# Patient Record
Sex: FEMALE | Race: BLACK OR AFRICAN AMERICAN | Employment: UNEMPLOYED | URBAN - METROPOLITAN AREA
[De-identification: names, ages, dates, MRNs, and addresses within clinical notes are randomized per-mention and may not be internally consistent; named-entity substitution may affect disease eponyms.]

---

## 2021-01-01 ENCOUNTER — APPOINTMENT (OUTPATIENT)
Dept: CT IMAGING | Age: 66
DRG: 480 | End: 2021-01-01
Payer: COMMERCIAL

## 2021-01-01 ENCOUNTER — APPOINTMENT (OUTPATIENT)
Dept: GENERAL RADIOLOGY | Age: 66
DRG: 480 | End: 2021-01-01
Payer: COMMERCIAL

## 2021-01-01 ENCOUNTER — APPOINTMENT (OUTPATIENT)
Dept: MRI IMAGING | Age: 66
DRG: 480 | End: 2021-01-01
Payer: COMMERCIAL

## 2021-01-01 ENCOUNTER — ANESTHESIA EVENT (OUTPATIENT)
Dept: OPERATING ROOM | Age: 66
DRG: 480 | End: 2021-01-01
Payer: COMMERCIAL

## 2021-01-01 ENCOUNTER — ANESTHESIA (OUTPATIENT)
Dept: OPERATING ROOM | Age: 66
DRG: 480 | End: 2021-01-01
Payer: COMMERCIAL

## 2021-01-01 ENCOUNTER — HOSPITAL ENCOUNTER (INPATIENT)
Age: 66
LOS: 7 days | DRG: 480 | End: 2021-04-28
Attending: EMERGENCY MEDICINE | Admitting: SURGERY
Payer: COMMERCIAL

## 2021-01-01 VITALS
TEMPERATURE: 99 F | OXYGEN SATURATION: 42 % | HEIGHT: 61 IN | HEART RATE: 22 BPM | RESPIRATION RATE: 5 BRPM | DIASTOLIC BLOOD PRESSURE: 58 MMHG | SYSTOLIC BLOOD PRESSURE: 91 MMHG | BODY MASS INDEX: 22.47 KG/M2 | WEIGHT: 119 LBS

## 2021-01-01 VITALS — DIASTOLIC BLOOD PRESSURE: 78 MMHG | TEMPERATURE: 98.7 F | OXYGEN SATURATION: 100 % | SYSTOLIC BLOOD PRESSURE: 126 MMHG

## 2021-01-01 DIAGNOSIS — S72.91XA CLOSED FRACTURE OF RIGHT FEMUR, UNSPECIFIED FRACTURE MORPHOLOGY, UNSPECIFIED PORTION OF FEMUR, INITIAL ENCOUNTER (HCC): Primary | ICD-10-CM

## 2021-01-01 LAB
-: ABNORMAL
ABO/RH: NORMAL
ABSOLUTE EOS #: 0 K/UL (ref 0–0.4)
ABSOLUTE EOS #: 0.08 K/UL (ref 0–0.44)
ABSOLUTE EOS #: 0.2 K/UL (ref 0–0.4)
ABSOLUTE IMMATURE GRANULOCYTE: 0 K/UL (ref 0–0.3)
ABSOLUTE IMMATURE GRANULOCYTE: 1.03 K/UL (ref 0–0.3)
ABSOLUTE IMMATURE GRANULOCYTE: 10.63 K/UL (ref 0–0.3)
ABSOLUTE IMMATURE GRANULOCYTE: 10.79 K/UL (ref 0–0.3)
ABSOLUTE LYMPH #: 1.04 K/UL (ref 1–4.8)
ABSOLUTE LYMPH #: 1.33 K/UL (ref 1–4.8)
ABSOLUTE LYMPH #: 1.83 K/UL (ref 1–4.8)
ABSOLUTE LYMPH #: 2.08 K/UL (ref 1.1–3.7)
ABSOLUTE LYMPH #: 2.79 K/UL (ref 1–4.8)
ABSOLUTE LYMPH #: 3.71 K/UL (ref 1–4.8)
ABSOLUTE LYMPH #: 3.99 K/UL (ref 1–4.8)
ABSOLUTE LYMPH #: 5.39 K/UL (ref 1–4.8)
ABSOLUTE MONO #: 0.21 K/UL (ref 0.1–0.8)
ABSOLUTE MONO #: 1.22 K/UL (ref 0.1–0.8)
ABSOLUTE MONO #: 1.24 K/UL (ref 0.1–0.8)
ABSOLUTE MONO #: 1.41 K/UL (ref 0.1–1.2)
ABSOLUTE MONO #: 1.55 K/UL (ref 0.1–0.8)
ABSOLUTE MONO #: 1.77 K/UL (ref 0.1–0.8)
ABSOLUTE MONO #: 1.8 K/UL (ref 0.1–0.8)
ABSOLUTE MONO #: 2.66 K/UL (ref 0.1–0.8)
ACTION: NORMAL
ALBUMIN SERPL-MCNC: 1.3 G/DL (ref 3.5–5.2)
ALBUMIN SERPL-MCNC: 1.3 G/DL (ref 3.5–5.2)
ALBUMIN SERPL-MCNC: 1.7 G/DL (ref 3.5–5.2)
ALBUMIN SERPL-MCNC: 1.7 G/DL (ref 3.5–5.2)
ALBUMIN SERPL-MCNC: 2.5 G/DL (ref 3.5–5.2)
ALBUMIN/GLOBULIN RATIO: 0.5 (ref 1–2.5)
ALBUMIN/GLOBULIN RATIO: 0.5 (ref 1–2.5)
ALBUMIN/GLOBULIN RATIO: 0.7 (ref 1–2.5)
ALBUMIN/GLOBULIN RATIO: 0.7 (ref 1–2.5)
ALBUMIN/GLOBULIN RATIO: 0.8 (ref 1–2.5)
ALLEN TEST: ABNORMAL
ALLEN TEST: POSITIVE
ALLEN TEST: POSITIVE
ALP BLD-CCNC: 135 U/L (ref 35–104)
ALP BLD-CCNC: 152 U/L (ref 35–104)
ALP BLD-CCNC: 235 U/L (ref 35–104)
ALP BLD-CCNC: 65 U/L (ref 35–104)
ALP BLD-CCNC: 82 U/L (ref 35–104)
ALT SERPL-CCNC: 22 U/L (ref 5–33)
ALT SERPL-CCNC: 23 U/L (ref 5–33)
ALT SERPL-CCNC: 29 U/L (ref 5–33)
ALT SERPL-CCNC: 30 U/L (ref 5–33)
ALT SERPL-CCNC: 41 U/L (ref 5–33)
AMMONIA: 67 UMOL/L (ref 11–51)
AMORPHOUS: ABNORMAL
AMPHETAMINE SCREEN URINE: NEGATIVE
AMPHETAMINE SCREEN URINE: NEGATIVE
ANION GAP SERPL CALCULATED.3IONS-SCNC: 11 MMOL/L (ref 9–17)
ANION GAP SERPL CALCULATED.3IONS-SCNC: 11 MMOL/L (ref 9–17)
ANION GAP SERPL CALCULATED.3IONS-SCNC: 17 MMOL/L (ref 9–17)
ANION GAP SERPL CALCULATED.3IONS-SCNC: 20 MMOL/L (ref 9–17)
ANION GAP SERPL CALCULATED.3IONS-SCNC: 21 MMOL/L (ref 9–17)
ANION GAP SERPL CALCULATED.3IONS-SCNC: 21 MMOL/L (ref 9–17)
ANION GAP SERPL CALCULATED.3IONS-SCNC: 23 MMOL/L (ref 9–17)
ANION GAP SERPL CALCULATED.3IONS-SCNC: 24 MMOL/L (ref 9–17)
ANION GAP SERPL CALCULATED.3IONS-SCNC: 28 MMOL/L (ref 9–17)
ANION GAP SERPL CALCULATED.3IONS-SCNC: 33 MMOL/L (ref 9–17)
ANION GAP SERPL CALCULATED.3IONS-SCNC: 42 MMOL/L (ref 9–17)
ANION GAP SERPL CALCULATED.3IONS-SCNC: 8 MMOL/L (ref 9–17)
ANION GAP SERPL CALCULATED.3IONS-SCNC: 9 MMOL/L (ref 9–17)
ANION GAP: 24 MMOL/L (ref 7–16)
ANTIBODY SCREEN: NEGATIVE
ARM BAND NUMBER: NORMAL
AST SERPL-CCNC: 101 U/L
AST SERPL-CCNC: 112 U/L
AST SERPL-CCNC: 113 U/L
AST SERPL-CCNC: 210 U/L
AST SERPL-CCNC: 71 U/L
BACTERIA: ABNORMAL
BARBITURATE SCREEN URINE: NEGATIVE
BARBITURATE SCREEN URINE: NEGATIVE
BASOPHILS # BLD: 0 % (ref 0–2)
BASOPHILS # BLD: 1 % (ref 0–2)
BASOPHILS # BLD: 1 % (ref 0–2)
BASOPHILS ABSOLUTE: 0 K/UL (ref 0–0.2)
BASOPHILS ABSOLUTE: 0.08 K/UL (ref 0–0.2)
BASOPHILS ABSOLUTE: 0.21 K/UL (ref 0–0.2)
BENZODIAZEPINE SCREEN, URINE: NEGATIVE
BENZODIAZEPINE SCREEN, URINE: NEGATIVE
BETA-HYDROXYBUTYRATE: 0.29 MMOL/L (ref 0.02–0.27)
BETA-HYDROXYBUTYRATE: 0.61 MMOL/L (ref 0.02–0.27)
BETA-HYDROXYBUTYRATE: 0.63 MMOL/L (ref 0.02–0.27)
BILIRUB SERPL-MCNC: 0.76 MG/DL (ref 0.3–1.2)
BILIRUB SERPL-MCNC: 0.79 MG/DL (ref 0.3–1.2)
BILIRUB SERPL-MCNC: 1.95 MG/DL (ref 0.3–1.2)
BILIRUB SERPL-MCNC: 2.26 MG/DL (ref 0.3–1.2)
BILIRUB SERPL-MCNC: 3.44 MG/DL (ref 0.3–1.2)
BILIRUBIN DIRECT: 0.49 MG/DL
BILIRUBIN DIRECT: 0.54 MG/DL
BILIRUBIN DIRECT: 1.74 MG/DL
BILIRUBIN DIRECT: 1.91 MG/DL
BILIRUBIN DIRECT: 2.3 MG/DL
BILIRUBIN URINE: ABNORMAL
BILIRUBIN, INDIRECT: 0.04 MG/DL (ref 0–1)
BILIRUBIN, INDIRECT: 0.22 MG/DL (ref 0–1)
BILIRUBIN, INDIRECT: 0.3 MG/DL (ref 0–1)
BILIRUBIN, INDIRECT: 0.52 MG/DL (ref 0–1)
BILIRUBIN, INDIRECT: 1.14 MG/DL (ref 0–1)
BLD PROD TYP BPU: NORMAL
BLOOD BANK SPECIMEN: ABNORMAL
BLOOD BANK SPECIMEN: NORMAL
BLOOD BANK SPECIMEN: NORMAL
BNP INTERPRETATION: ABNORMAL
BUN BLDV-MCNC: 12 MG/DL (ref 8–23)
BUN BLDV-MCNC: 14 MG/DL (ref 8–23)
BUN BLDV-MCNC: 17 MG/DL (ref 8–23)
BUN BLDV-MCNC: 2 MG/DL (ref 8–23)
BUN BLDV-MCNC: 2 MG/DL (ref 8–23)
BUN BLDV-MCNC: 20 MG/DL (ref 8–23)
BUN BLDV-MCNC: 3 MG/DL (ref 8–23)
BUN BLDV-MCNC: 3 MG/DL (ref 8–23)
BUN BLDV-MCNC: 4 MG/DL (ref 8–23)
BUN BLDV-MCNC: 5 MG/DL (ref 8–23)
BUN BLDV-MCNC: 7 MG/DL (ref 8–23)
BUN BLDV-MCNC: 8 MG/DL (ref 8–23)
BUN BLDV-MCNC: 9 MG/DL (ref 8–23)
BUN/CREAT BLD: ABNORMAL (ref 9–20)
BUPRENORPHINE URINE: ABNORMAL
BUPRENORPHINE URINE: ABNORMAL
CALCIUM IONIZED: 1.16 MMOL/L (ref 1.13–1.33)
CALCIUM IONIZED: 1.29 MMOL/L (ref 1.13–1.33)
CALCIUM SERPL-MCNC: 6.7 MG/DL (ref 8.6–10.4)
CALCIUM SERPL-MCNC: 7 MG/DL (ref 8.6–10.4)
CALCIUM SERPL-MCNC: 7.1 MG/DL (ref 8.6–10.4)
CALCIUM SERPL-MCNC: 7.3 MG/DL (ref 8.6–10.4)
CALCIUM SERPL-MCNC: 7.5 MG/DL (ref 8.6–10.4)
CALCIUM SERPL-MCNC: 7.5 MG/DL (ref 8.6–10.4)
CALCIUM SERPL-MCNC: 7.8 MG/DL (ref 8.6–10.4)
CALCIUM SERPL-MCNC: 8 MG/DL (ref 8.6–10.4)
CALCIUM SERPL-MCNC: 8.2 MG/DL (ref 8.6–10.4)
CALCIUM SERPL-MCNC: 8.5 MG/DL (ref 8.6–10.4)
CANNABINOID SCREEN URINE: NEGATIVE
CANNABINOID SCREEN URINE: NEGATIVE
CARBOXYHEMOGLOBIN: 4.7 % (ref 0–5)
CASTS UA: ABNORMAL /LPF (ref 0–8)
CHLORIDE BLD-SCNC: 87 MMOL/L (ref 98–107)
CHLORIDE BLD-SCNC: 87 MMOL/L (ref 98–107)
CHLORIDE BLD-SCNC: 90 MMOL/L (ref 98–107)
CHLORIDE BLD-SCNC: 91 MMOL/L (ref 98–107)
CHLORIDE BLD-SCNC: 92 MMOL/L (ref 98–107)
CHLORIDE BLD-SCNC: 94 MMOL/L (ref 98–107)
CHLORIDE BLD-SCNC: 95 MMOL/L (ref 98–107)
CHLORIDE BLD-SCNC: 96 MMOL/L (ref 98–107)
CHLORIDE BLD-SCNC: 96 MMOL/L (ref 98–107)
CHLORIDE BLD-SCNC: 98 MMOL/L (ref 98–107)
CHLORIDE BLD-SCNC: 98 MMOL/L (ref 98–107)
CO2: 11 MMOL/L (ref 20–31)
CO2: 12 MMOL/L (ref 20–31)
CO2: 17 MMOL/L (ref 20–31)
CO2: 19 MMOL/L (ref 20–31)
CO2: 20 MMOL/L (ref 20–31)
CO2: 20 MMOL/L (ref 20–31)
CO2: 21 MMOL/L (ref 20–31)
CO2: 23 MMOL/L (ref 20–31)
CO2: 26 MMOL/L (ref 20–31)
CO2: 7 MMOL/L (ref 20–31)
COCAINE METABOLITE, URINE: NEGATIVE
COCAINE METABOLITE, URINE: POSITIVE
COLOR: ABNORMAL
CORTISOL COLLECTION INFO: ABNORMAL
CORTISOL: 366.7 UG/DL (ref 2.7–18.4)
CREAT SERPL-MCNC: 0.23 MG/DL (ref 0.5–0.9)
CREAT SERPL-MCNC: 0.25 MG/DL (ref 0.5–0.9)
CREAT SERPL-MCNC: 0.28 MG/DL (ref 0.5–0.9)
CREAT SERPL-MCNC: 0.31 MG/DL (ref 0.5–0.9)
CREAT SERPL-MCNC: 0.38 MG/DL (ref 0.5–0.9)
CREAT SERPL-MCNC: 0.58 MG/DL (ref 0.5–0.9)
CREAT SERPL-MCNC: 0.62 MG/DL (ref 0.5–0.9)
CREAT SERPL-MCNC: 0.7 MG/DL (ref 0.5–0.9)
CREAT SERPL-MCNC: 0.86 MG/DL (ref 0.5–0.9)
CREAT SERPL-MCNC: 0.88 MG/DL (ref 0.5–0.9)
CREAT SERPL-MCNC: 1.07 MG/DL (ref 0.5–0.9)
CREAT SERPL-MCNC: 1.78 MG/DL (ref 0.5–0.9)
CREAT SERPL-MCNC: <0.2 MG/DL (ref 0.5–0.9)
CROSSMATCH RESULT: NORMAL
CRYSTALS, UA: ABNORMAL /HPF
CULTURE: NO GROWTH
D-DIMER QUANTITATIVE: 28.19 MG/L FEU
DATE AND TIME: NORMAL
DIFFERENTIAL TYPE: ABNORMAL
DISPENSE STATUS BLOOD BANK: NORMAL
EKG ATRIAL RATE: 108 BPM
EKG ATRIAL RATE: 109 BPM
EKG ATRIAL RATE: 117 BPM
EKG ATRIAL RATE: 117 BPM
EKG ATRIAL RATE: 144 BPM
EKG ATRIAL RATE: 66 BPM
EKG ATRIAL RATE: 72 BPM
EKG ATRIAL RATE: 79 BPM
EKG P AXIS: 48 DEGREES
EKG P AXIS: 48 DEGREES
EKG P AXIS: 57 DEGREES
EKG P AXIS: 65 DEGREES
EKG P-R INTERVAL: 124 MS
EKG P-R INTERVAL: 144 MS
EKG P-R INTERVAL: 144 MS
EKG P-R INTERVAL: 176 MS
EKG Q-T INTERVAL: 250 MS
EKG Q-T INTERVAL: 258 MS
EKG Q-T INTERVAL: 258 MS
EKG Q-T INTERVAL: 270 MS
EKG Q-T INTERVAL: 288 MS
EKG Q-T INTERVAL: 298 MS
EKG Q-T INTERVAL: 298 MS
EKG Q-T INTERVAL: 330 MS
EKG QRS DURATION: 136 MS
EKG QRS DURATION: 44 MS
EKG QRS DURATION: 52 MS
EKG QRS DURATION: 54 MS
EKG QRS DURATION: 62 MS
EKG QRS DURATION: 80 MS
EKG QTC CALCULATION (BAZETT): 359 MS
EKG QTC CALCULATION (BAZETT): 399 MS
EKG QTC CALCULATION (BAZETT): 401 MS
EKG QTC CALCULATION (BAZETT): 401 MS
EKG QTC CALCULATION (BAZETT): 405 MS
EKG QTC CALCULATION (BAZETT): 414 MS
EKG QTC CALCULATION (BAZETT): 414 MS
EKG QTC CALCULATION (BAZETT): 462 MS
EKG R AXIS: -43 DEGREES
EKG R AXIS: 115 DEGREES
EKG R AXIS: 60 DEGREES
EKG R AXIS: 60 DEGREES
EKG R AXIS: 65 DEGREES
EKG R AXIS: 66 DEGREES
EKG R AXIS: 67 DEGREES
EKG R AXIS: 70 DEGREES
EKG T AXIS: -103 DEGREES
EKG T AXIS: -37 DEGREES
EKG T AXIS: -55 DEGREES
EKG T AXIS: -58 DEGREES
EKG T AXIS: -64 DEGREES
EKG T AXIS: -71 DEGREES
EKG T AXIS: 129 DEGREES
EKG T AXIS: 56 DEGREES
EKG VENTRICULAR RATE: 108 BPM
EKG VENTRICULAR RATE: 109 BPM
EKG VENTRICULAR RATE: 117 BPM
EKG VENTRICULAR RATE: 117 BPM
EKG VENTRICULAR RATE: 118 BPM
EKG VENTRICULAR RATE: 135 BPM
EKG VENTRICULAR RATE: 155 BPM
EKG VENTRICULAR RATE: 165 BPM
EOSINOPHILS RELATIVE PERCENT: 0 % (ref 1–4)
EOSINOPHILS RELATIVE PERCENT: 1 % (ref 1–4)
EOSINOPHILS RELATIVE PERCENT: 1 % (ref 1–4)
EPITHELIAL CELLS UA: ABNORMAL /HPF (ref 0–5)
ETHANOL PERCENT: 0.19 %
ETHANOL PERCENT: <0.01 %
ETHANOL: 187 MG/DL
ETHANOL: <10 MG/DL
EXPIRATION DATE: NORMAL
FIBRINOGEN: 303 MG/DL (ref 140–420)
FIO2: 100
FIO2: 6
FIO2: ABNORMAL
GFR AFRICAN AMERICAN: 35 ML/MIN
GFR AFRICAN AMERICAN: >60 ML/MIN
GFR AFRICAN AMERICAN: ABNORMAL ML/MIN
GFR NON-AFRICAN AMERICAN: 29 ML/MIN
GFR NON-AFRICAN AMERICAN: 34 ML/MIN
GFR NON-AFRICAN AMERICAN: 51 ML/MIN
GFR NON-AFRICAN AMERICAN: >60 ML/MIN
GFR NON-AFRICAN AMERICAN: ABNORMAL ML/MIN
GFR SERPL CREATININE-BSD FRML MDRD: 42 ML/MIN
GFR SERPL CREATININE-BSD FRML MDRD: ABNORMAL ML/MIN/{1.73_M2}
GLOBULIN: ABNORMAL G/DL (ref 1.5–3.8)
GLUCOSE BLD-MCNC: 10 MG/DL (ref 70–99)
GLUCOSE BLD-MCNC: 100 MG/DL (ref 65–105)
GLUCOSE BLD-MCNC: 101 MG/DL (ref 74–100)
GLUCOSE BLD-MCNC: 101 MG/DL (ref 74–100)
GLUCOSE BLD-MCNC: 102 MG/DL (ref 74–100)
GLUCOSE BLD-MCNC: 103 MG/DL (ref 70–99)
GLUCOSE BLD-MCNC: 103 MG/DL (ref 70–99)
GLUCOSE BLD-MCNC: 104 MG/DL (ref 70–99)
GLUCOSE BLD-MCNC: 104 MG/DL (ref 70–99)
GLUCOSE BLD-MCNC: 105 MG/DL (ref 65–105)
GLUCOSE BLD-MCNC: 106 MG/DL (ref 65–105)
GLUCOSE BLD-MCNC: 107 MG/DL (ref 70–99)
GLUCOSE BLD-MCNC: 113 MG/DL (ref 65–105)
GLUCOSE BLD-MCNC: 115 MG/DL (ref 65–105)
GLUCOSE BLD-MCNC: 117 MG/DL (ref 65–105)
GLUCOSE BLD-MCNC: 117 MG/DL (ref 70–99)
GLUCOSE BLD-MCNC: 118 MG/DL (ref 74–100)
GLUCOSE BLD-MCNC: 120 MG/DL (ref 65–105)
GLUCOSE BLD-MCNC: 120 MG/DL (ref 65–105)
GLUCOSE BLD-MCNC: 127 MG/DL (ref 65–105)
GLUCOSE BLD-MCNC: 132 MG/DL (ref 74–100)
GLUCOSE BLD-MCNC: 135 MG/DL (ref 65–105)
GLUCOSE BLD-MCNC: 135 MG/DL (ref 65–105)
GLUCOSE BLD-MCNC: 143 MG/DL (ref 65–105)
GLUCOSE BLD-MCNC: 147 MG/DL (ref 70–99)
GLUCOSE BLD-MCNC: 172 MG/DL (ref 65–105)
GLUCOSE BLD-MCNC: 35 MG/DL (ref 65–105)
GLUCOSE BLD-MCNC: 38 MG/DL (ref 65–105)
GLUCOSE BLD-MCNC: 41 MG/DL (ref 65–105)
GLUCOSE BLD-MCNC: 48 MG/DL (ref 65–105)
GLUCOSE BLD-MCNC: 56 MG/DL (ref 70–99)
GLUCOSE BLD-MCNC: 58 MG/DL (ref 65–105)
GLUCOSE BLD-MCNC: 59 MG/DL (ref 65–105)
GLUCOSE BLD-MCNC: 60 MG/DL (ref 65–105)
GLUCOSE BLD-MCNC: 65 MG/DL (ref 65–105)
GLUCOSE BLD-MCNC: 70 MG/DL (ref 70–99)
GLUCOSE BLD-MCNC: 72 MG/DL (ref 65–105)
GLUCOSE BLD-MCNC: 73 MG/DL (ref 65–105)
GLUCOSE BLD-MCNC: 74 MG/DL (ref 74–100)
GLUCOSE BLD-MCNC: 75 MG/DL (ref 70–99)
GLUCOSE BLD-MCNC: 77 MG/DL (ref 74–100)
GLUCOSE BLD-MCNC: 78 MG/DL (ref 65–105)
GLUCOSE BLD-MCNC: 78 MG/DL (ref 65–105)
GLUCOSE BLD-MCNC: 80 MG/DL (ref 65–105)
GLUCOSE BLD-MCNC: 81 MG/DL (ref 65–105)
GLUCOSE BLD-MCNC: 81 MG/DL (ref 70–99)
GLUCOSE BLD-MCNC: 81 MG/DL (ref 74–100)
GLUCOSE BLD-MCNC: 82 MG/DL (ref 65–105)
GLUCOSE BLD-MCNC: 84 MG/DL (ref 65–105)
GLUCOSE BLD-MCNC: 85 MG/DL (ref 65–105)
GLUCOSE BLD-MCNC: 85 MG/DL (ref 74–100)
GLUCOSE BLD-MCNC: 88 MG/DL (ref 65–105)
GLUCOSE BLD-MCNC: 91 MG/DL (ref 65–105)
GLUCOSE BLD-MCNC: 91 MG/DL (ref 70–99)
GLUCOSE BLD-MCNC: 93 MG/DL (ref 65–105)
GLUCOSE BLD-MCNC: 93 MG/DL (ref 74–100)
GLUCOSE BLD-MCNC: 95 MG/DL (ref 74–100)
GLUCOSE BLD-MCNC: 98 MG/DL (ref 65–105)
GLUCOSE BLD-MCNC: 98 MG/DL (ref 74–100)
GLUCOSE BLD-MCNC: 99 MG/DL (ref 65–105)
GLUCOSE BLD-MCNC: <10 MG/DL (ref 65–105)
GLUCOSE URINE: NEGATIVE
HCG QUALITATIVE: NEGATIVE
HCO3 VENOUS: 20.2 MMOL/L (ref 24–30)
HCT VFR BLD CALC: 21.6 % (ref 36.3–47.1)
HCT VFR BLD CALC: 22.1 % (ref 36.3–47.1)
HCT VFR BLD CALC: 23.1 %
HCT VFR BLD CALC: 23.5 % (ref 36.3–47.1)
HCT VFR BLD CALC: 23.9 % (ref 36.3–47.1)
HCT VFR BLD CALC: 24.2 % (ref 36.3–47.1)
HCT VFR BLD CALC: 24.5 % (ref 36.3–47.1)
HCT VFR BLD CALC: 24.5 % (ref 36.3–47.1)
HCT VFR BLD CALC: 25.3 % (ref 36.3–47.1)
HCT VFR BLD CALC: 27.1 % (ref 36.3–47.1)
HCT VFR BLD CALC: 27.9 % (ref 36.3–47.1)
HCT VFR BLD CALC: 29.4 % (ref 36.3–47.1)
HCT VFR BLD CALC: 30.4 % (ref 36.3–47.1)
HEMOGLOBIN: 6.7 G/DL (ref 11.9–15.1)
HEMOGLOBIN: 6.7 G/DL (ref 11.9–15.1)
HEMOGLOBIN: 6.8 G/DL (ref 11.9–15.1)
HEMOGLOBIN: 6.9 G/DL (ref 11.9–15.1)
HEMOGLOBIN: 7.3 G/DL (ref 11.9–15.1)
HEMOGLOBIN: 7.4 GM/DL
HEMOGLOBIN: 7.7 G/DL (ref 11.9–15.1)
HEMOGLOBIN: 8.2 G/DL (ref 11.9–15.1)
HEMOGLOBIN: 8.5 G/DL (ref 11.9–15.1)
HEMOGLOBIN: 9.2 G/DL (ref 11.9–15.1)
HEMOGLOBIN: 9.5 G/DL (ref 11.9–15.1)
IMMATURE GRANULOCYTES: 0 %
IMMATURE GRANULOCYTES: 12 %
IMMATURE GRANULOCYTES: 24 %
IMMATURE GRANULOCYTES: 5 %
INR BLD: 1.2
INR BLD: 1.5
KETONES, URINE: ABNORMAL
LACTIC ACID, SEPSIS WHOLE BLOOD: 14.9 MMOL/L (ref 0.5–1.9)
LACTIC ACID, SEPSIS WHOLE BLOOD: 16 MMOL/L (ref 0.5–1.9)
LACTIC ACID, SEPSIS: ABNORMAL MMOL/L (ref 0.5–1.9)
LACTIC ACID, SEPSIS: ABNORMAL MMOL/L (ref 0.5–1.9)
LEUKOCYTE ESTERASE, URINE: ABNORMAL
LV EF: 65 %
LVEF MODALITY: NORMAL
LYMPHOCYTES # BLD: 18 % (ref 24–44)
LYMPHOCYTES # BLD: 2 % (ref 24–44)
LYMPHOCYTES # BLD: 25 % (ref 24–43)
LYMPHOCYTES # BLD: 5 % (ref 24–44)
LYMPHOCYTES # BLD: 6 % (ref 24–44)
LYMPHOCYTES # BLD: 9 % (ref 24–44)
Lab: NORMAL
MAGNESIUM: 1.7 MG/DL (ref 1.6–2.6)
MAGNESIUM: 2 MG/DL (ref 1.6–2.6)
MAGNESIUM: 2.8 MG/DL (ref 1.6–2.6)
MCH RBC QN AUTO: 22.7 PG (ref 25.2–33.5)
MCH RBC QN AUTO: 24.1 PG (ref 25.2–33.5)
MCH RBC QN AUTO: 25.1 PG (ref 25.2–33.5)
MCH RBC QN AUTO: 25.8 PG (ref 25.2–33.5)
MCH RBC QN AUTO: 26.2 PG (ref 25.2–33.5)
MCH RBC QN AUTO: 26.4 PG (ref 25.2–33.5)
MCH RBC QN AUTO: 26.6 PG (ref 25.2–33.5)
MCH RBC QN AUTO: 26.7 PG (ref 25.2–33.5)
MCH RBC QN AUTO: 26.8 PG (ref 25.2–33.5)
MCH RBC QN AUTO: 27 PG (ref 25.2–33.5)
MCHC RBC AUTO-ENTMCNC: 28.2 G/DL (ref 28.4–34.8)
MCHC RBC AUTO-ENTMCNC: 28.9 G/DL (ref 28.4–34.8)
MCHC RBC AUTO-ENTMCNC: 30.3 G/DL (ref 28.4–34.8)
MCHC RBC AUTO-ENTMCNC: 30.5 G/DL (ref 28.4–34.8)
MCHC RBC AUTO-ENTMCNC: 30.5 G/DL (ref 28.4–34.8)
MCHC RBC AUTO-ENTMCNC: 31 G/DL (ref 28.4–34.8)
MCHC RBC AUTO-ENTMCNC: 31.3 G/DL (ref 28.4–34.8)
MCHC RBC AUTO-ENTMCNC: 31.8 G/DL (ref 28.4–34.8)
MCHC RBC AUTO-ENTMCNC: 32.4 G/DL (ref 28.4–34.8)
MCHC RBC AUTO-ENTMCNC: 33.5 G/DL (ref 28.4–34.8)
MCV RBC AUTO: 73.2 FL (ref 82.6–102.9)
MCV RBC AUTO: 78.3 FL (ref 82.6–102.9)
MCV RBC AUTO: 79.3 FL (ref 82.6–102.9)
MCV RBC AUTO: 81.4 FL (ref 82.6–102.9)
MCV RBC AUTO: 82.8 FL (ref 82.6–102.9)
MCV RBC AUTO: 84.9 FL (ref 82.6–102.9)
MCV RBC AUTO: 85.2 FL (ref 82.6–102.9)
MCV RBC AUTO: 87.2 FL (ref 82.6–102.9)
MCV RBC AUTO: 89 FL (ref 82.6–102.9)
MCV RBC AUTO: 95.3 FL (ref 82.6–102.9)
MDMA URINE: ABNORMAL
MDMA URINE: ABNORMAL
METHADONE SCREEN, URINE: NEGATIVE
METHADONE SCREEN, URINE: NEGATIVE
METHAMPHETAMINE, URINE: ABNORMAL
METHAMPHETAMINE, URINE: ABNORMAL
METHEMOGLOBIN: 0.7 % (ref 0–1.5)
METHEMOGLOBIN: ABNORMAL % (ref 0–1.5)
MODE: ABNORMAL
MONOCYTES # BLD: 1 % (ref 1–7)
MONOCYTES # BLD: 17 % (ref 3–12)
MONOCYTES # BLD: 2 % (ref 1–7)
MONOCYTES # BLD: 4 % (ref 1–7)
MONOCYTES # BLD: 4 % (ref 1–7)
MONOCYTES # BLD: 5 % (ref 1–7)
MONOCYTES # BLD: 6 % (ref 1–7)
MONOCYTES # BLD: 6 % (ref 1–7)
MORPHOLOGY: ABNORMAL
MUCUS: ABNORMAL
NEGATIVE BASE EXCESS, ART: 10 (ref 0–2)
NEGATIVE BASE EXCESS, ART: 15 (ref 0–2)
NEGATIVE BASE EXCESS, ART: 16 (ref 0–2)
NEGATIVE BASE EXCESS, ART: 2 (ref 0–2)
NEGATIVE BASE EXCESS, ART: 22 (ref 0–2)
NEGATIVE BASE EXCESS, ART: 24 (ref 0–2)
NEGATIVE BASE EXCESS, ART: 3 (ref 0–2)
NEGATIVE BASE EXCESS, ART: 4 (ref 0–2)
NEGATIVE BASE EXCESS, ART: 5 (ref 0–2)
NEGATIVE BASE EXCESS, ART: 7 (ref 0–2)
NEGATIVE BASE EXCESS, ART: ABNORMAL (ref 0–2)
NEGATIVE BASE EXCESS, VEN: 4.4 MMOL/L (ref 0–2)
NITRITE, URINE: NEGATIVE
NOTIFICATION TIME: ABNORMAL
NOTIFICATION: ABNORMAL
NOTIFY: NORMAL
NRBC AUTOMATED: 0 PER 100 WBC
NRBC AUTOMATED: 0.3 PER 100 WBC
NRBC AUTOMATED: 0.6 PER 100 WBC
NRBC AUTOMATED: 12.9 PER 100 WBC
NRBC AUTOMATED: 17.2 PER 100 WBC
NRBC AUTOMATED: 3.6 PER 100 WBC
NRBC AUTOMATED: 88.2 PER 100 WBC
NRBC AUTOMATED: 9.4 PER 100 WBC
NUCLEATED RED BLOOD CELLS: 1 PER 100 WBC
NUCLEATED RED BLOOD CELLS: 1 PER 100 WBC
NUCLEATED RED BLOOD CELLS: 15 PER 100 WBC
NUCLEATED RED BLOOD CELLS: 18 PER 100 WBC
NUCLEATED RED BLOOD CELLS: 2 PER 100 WBC
NUCLEATED RED BLOOD CELLS: 6 PER 100 WBC
O2 DEVICE/FLOW/%: ABNORMAL
O2 SAT, VEN: 68.4 % (ref 60–85)
OPIATES, URINE: NEGATIVE
OPIATES, URINE: NEGATIVE
OSMOLALITY URINE: 335 MOSM/KG (ref 80–1300)
OTHER OBSERVATIONS UA: ABNORMAL
OXYCODONE SCREEN URINE: POSITIVE
OXYCODONE SCREEN URINE: POSITIVE
OXYHEMOGLOBIN: ABNORMAL % (ref 95–98)
PARTIAL THROMBOPLASTIN TIME: 25.2 SEC (ref 20.5–30.5)
PARTIAL THROMBOPLASTIN TIME: 63.6 SEC (ref 20.5–30.5)
PATIENT TEMP: 36.2
PATIENT TEMP: 37
PATIENT TEMP: 37.7
PATIENT TEMP: 38.1
PATIENT TEMP: 38.2
PATIENT TEMP: ABNORMAL
PCO2, VEN, TEMP ADJ: ABNORMAL MMHG (ref 39–55)
PCO2, VEN: 37.2 (ref 39–55)
PDW BLD-RTO: 18.9 % (ref 11.8–14.4)
PDW BLD-RTO: 19.4 % (ref 11.8–14.4)
PDW BLD-RTO: 19.9 % (ref 11.8–14.4)
PDW BLD-RTO: 20.5 % (ref 11.8–14.4)
PDW BLD-RTO: 20.7 % (ref 11.8–14.4)
PDW BLD-RTO: 21.9 % (ref 11.8–14.4)
PDW BLD-RTO: 22 % (ref 11.8–14.4)
PDW BLD-RTO: 22.6 % (ref 11.8–14.4)
PDW BLD-RTO: 23.7 % (ref 11.8–14.4)
PDW BLD-RTO: 25.1 % (ref 11.8–14.4)
PEEP/CPAP: ABNORMAL
PH UA: 5 (ref 5–8)
PH VENOUS: 7.35 (ref 7.32–7.42)
PH, VEN, TEMP ADJ: ABNORMAL (ref 7.32–7.42)
PHENCYCLIDINE, URINE: NEGATIVE
PHENCYCLIDINE, URINE: NEGATIVE
PHOSPHORUS: 3.8 MG/DL (ref 2.6–4.5)
PHOSPHORUS: 4.2 MG/DL (ref 2.6–4.5)
PHOSPHORUS: 4.3 MG/DL (ref 2.6–4.5)
PHOSPHORUS: 6.3 MG/DL (ref 2.6–4.5)
PHOSPHORUS: 6.4 MG/DL (ref 2.6–4.5)
PLATELET # BLD: 161 K/UL (ref 138–453)
PLATELET # BLD: 174 K/UL (ref 138–453)
PLATELET # BLD: 198 K/UL (ref 138–453)
PLATELET # BLD: 247 K/UL (ref 138–453)
PLATELET # BLD: 258 K/UL (ref 138–453)
PLATELET # BLD: 265 K/UL (ref 138–453)
PLATELET # BLD: 277 K/UL (ref 138–453)
PLATELET # BLD: ABNORMAL K/UL (ref 138–453)
PLATELET ESTIMATE: ABNORMAL
PLATELET, FLUORESCENCE: 111 K/UL (ref 138–453)
PLATELET, FLUORESCENCE: 127 K/UL (ref 138–453)
PLATELET, FLUORESCENCE: 45 K/UL (ref 138–453)
PLATELET, IMMATURE FRACTION: 14.2 % (ref 1.1–10.3)
PLATELET, IMMATURE FRACTION: 14.5 % (ref 1.1–10.3)
PMV BLD AUTO: 8.4 FL (ref 8.1–13.5)
PMV BLD AUTO: 8.8 FL (ref 8.1–13.5)
PMV BLD AUTO: 8.8 FL (ref 8.1–13.5)
PMV BLD AUTO: 8.9 FL (ref 8.1–13.5)
PMV BLD AUTO: 8.9 FL (ref 8.1–13.5)
PMV BLD AUTO: 9.1 FL (ref 8.1–13.5)
PMV BLD AUTO: 9.9 FL (ref 8.1–13.5)
PMV BLD AUTO: ABNORMAL FL (ref 8.1–13.5)
PO2, VEN, TEMP ADJ: ABNORMAL MMHG (ref 30–50)
PO2, VEN: 42.6 (ref 30–50)
POC BUN: 10 MG/DL (ref 8–26)
POC CHLORIDE: 94 MMOL/L (ref 98–107)
POC CREATININE: 1.52 MG/DL (ref 0.51–1.19)
POC HCO3: 10.1 MMOL/L (ref 21–28)
POC HCO3: 12.5 MMOL/L (ref 21–28)
POC HCO3: 15.1 MMOL/L (ref 21–28)
POC HCO3: 17.2 MMOL/L (ref 21–28)
POC HCO3: 21.9 MMOL/L (ref 21–28)
POC HCO3: 22.2 MMOL/L (ref 21–28)
POC HCO3: 23.3 MMOL/L (ref 21–28)
POC HCO3: 23.3 MMOL/L (ref 21–28)
POC HCO3: 23.8 MMOL/L (ref 21–28)
POC HCO3: 25.3 MMOL/L (ref 21–28)
POC HCO3: 25.5 MMOL/L (ref 21–28)
POC HCO3: 25.6 MMOL/L (ref 21–28)
POC HCO3: 25.7 MMOL/L (ref 21–28)
POC HCO3: 27.3 MMOL/L (ref 21–28)
POC HCO3: 27.4 MMOL/L (ref 21–28)
POC HCO3: 27.5 MMOL/L (ref 21–28)
POC HCO3: 28.3 MMOL/L (ref 21–28)
POC HCO3: 29.5 MMOL/L (ref 21–28)
POC HCO3: 5 MMOL/L (ref 21–28)
POC HEMATOCRIT: 29 % (ref 36–46)
POC HEMOGLOBIN: 9.8 G/DL (ref 12–16)
POC IONIZED CALCIUM: 1.02 MMOL/L (ref 1.15–1.33)
POC LACTIC ACID: 15.84 MMOL/L (ref 0.56–1.39)
POC LACTIC ACID: 15.86 MMOL/L (ref 0.56–1.39)
POC LACTIC ACID: 15.92 MMOL/L (ref 0.56–1.39)
POC LACTIC ACID: 15.93 MMOL/L (ref 0.56–1.39)
POC LACTIC ACID: 15.94 MMOL/L (ref 0.56–1.39)
POC LACTIC ACID: 16.41 MMOL/L (ref 0.56–1.39)
POC LACTIC ACID: 16.81 MMOL/L (ref 0.56–1.39)
POC LACTIC ACID: 17.08 MMOL/L (ref 0.56–1.39)
POC LACTIC ACID: 17.48 MMOL/L (ref 0.56–1.39)
POC LACTIC ACID: 17.61 MMOL/L (ref 0.56–1.39)
POC LACTIC ACID: 18.15 MMOL/L (ref 0.56–1.39)
POC LACTIC ACID: 18.26 MMOL/L (ref 0.56–1.39)
POC LACTIC ACID: 18.97 MMOL/L (ref 0.56–1.39)
POC LACTIC ACID: 19.39 MMOL/L (ref 0.56–1.39)
POC LACTIC ACID: 19.82 MMOL/L (ref 0.56–1.39)
POC LACTIC ACID: >20 MMOL/L (ref 0.56–1.39)
POC O2 SATURATION: 100 % (ref 94–98)
POC O2 SATURATION: 19 % (ref 94–98)
POC O2 SATURATION: 28 % (ref 94–98)
POC O2 SATURATION: 55 % (ref 94–98)
POC O2 SATURATION: 61 % (ref 94–98)
POC O2 SATURATION: 62 % (ref 94–98)
POC O2 SATURATION: 63 % (ref 94–98)
POC O2 SATURATION: 65 % (ref 94–98)
POC O2 SATURATION: 67 % (ref 94–98)
POC O2 SATURATION: 68 % (ref 94–98)
POC O2 SATURATION: 71 % (ref 94–98)
POC O2 SATURATION: 83 % (ref 94–98)
POC O2 SATURATION: 85 % (ref 94–98)
POC O2 SATURATION: 86 % (ref 94–98)
POC O2 SATURATION: 90 % (ref 94–98)
POC O2 SATURATION: 97 % (ref 94–98)
POC O2 SATURATION: 99 % (ref 94–98)
POC PCO2 TEMP: 38 MM HG
POC PCO2 TEMP: 57 MM HG
POC PCO2 TEMP: 76 MM HG
POC PCO2 TEMP: ABNORMAL MM HG
POC PCO2: 18 MM HG (ref 35–48)
POC PCO2: 35.9 MM HG (ref 35–48)
POC PCO2: 40.2 MM HG (ref 35–48)
POC PCO2: 44.5 MM HG (ref 35–48)
POC PCO2: 44.8 MM HG (ref 35–48)
POC PCO2: 46.5 MM HG (ref 35–48)
POC PCO2: 52.5 MM HG (ref 35–48)
POC PCO2: 54 MM HG (ref 35–48)
POC PCO2: 54.3 MM HG (ref 35–48)
POC PCO2: 54.5 MM HG (ref 35–48)
POC PCO2: 54.5 MM HG (ref 35–48)
POC PCO2: 56.2 MM HG (ref 35–48)
POC PCO2: 56.6 MM HG (ref 35–48)
POC PCO2: 58.4 MM HG (ref 35–48)
POC PCO2: 58.8 MM HG (ref 35–48)
POC PCO2: 59.4 MM HG (ref 35–48)
POC PCO2: 59.7 MM HG (ref 35–48)
POC PCO2: 60.9 MM HG (ref 35–48)
POC PCO2: 73.5 MM HG (ref 35–48)
POC PH TEMP: 7.14
POC PH TEMP: 7.14
POC PH TEMP: 7.27
POC PH TEMP: ABNORMAL
POC PH: 6.88 (ref 7.35–7.45)
POC PH: 7.02 (ref 7.35–7.45)
POC PH: 7.05 (ref 7.35–7.45)
POC PH: 7.15 (ref 7.35–7.45)
POC PH: 7.15 (ref 7.35–7.45)
POC PH: 7.16 (ref 7.35–7.45)
POC PH: 7.21 (ref 7.35–7.45)
POC PH: 7.24 (ref 7.35–7.45)
POC PH: 7.26 (ref 7.35–7.45)
POC PH: 7.27 (ref 7.35–7.45)
POC PH: 7.28 (ref 7.35–7.45)
POC PH: 7.29 (ref 7.35–7.45)
POC PH: 7.3 (ref 7.35–7.45)
POC PH: 7.31 (ref 7.35–7.45)
POC PH: 7.33 (ref 7.35–7.45)
POC PH: 7.39 (ref 7.35–7.45)
POC PH: 7.39 (ref 7.35–7.45)
POC PO2 TEMP: 167 MM HG
POC PO2 TEMP: 20 MM HG
POC PO2 TEMP: 40 MM HG
POC PO2 TEMP: ABNORMAL MM HG
POC PO2: 160.1 MM HG (ref 83–108)
POC PO2: 19.4 MM HG (ref 83–108)
POC PO2: 206.6 MM HG (ref 83–108)
POC PO2: 216 MM HG (ref 83–108)
POC PO2: 31.4 MM HG (ref 83–108)
POC PO2: 324.3 MM HG (ref 83–108)
POC PO2: 34.4 MM HG (ref 83–108)
POC PO2: 36.7 MM HG (ref 83–108)
POC PO2: 37.7 MM HG (ref 83–108)
POC PO2: 39.1 MM HG (ref 83–108)
POC PO2: 41 MM HG (ref 83–108)
POC PO2: 43.9 MM HG (ref 83–108)
POC PO2: 50.1 MM HG (ref 83–108)
POC PO2: 51.6 MM HG (ref 83–108)
POC PO2: 56.9 MM HG (ref 83–108)
POC PO2: 58.3 MM HG (ref 83–108)
POC PO2: 60 MM HG (ref 83–108)
POC PO2: 64.1 MM HG (ref 83–108)
POC PO2: 94.7 MM HG (ref 83–108)
POC POTASSIUM: 4.1 MMOL/L (ref 3.5–4.5)
POC SODIUM: 140 MMOL/L (ref 138–146)
POC TCO2: 23 MMOL/L (ref 22–30)
POSITIVE BASE EXCESS, ART: 0 (ref 0–3)
POSITIVE BASE EXCESS, ART: 0 (ref 0–3)
POSITIVE BASE EXCESS, ART: 2 (ref 0–3)
POSITIVE BASE EXCESS, ART: 3 (ref 0–3)
POSITIVE BASE EXCESS, ART: 3 (ref 0–3)
POSITIVE BASE EXCESS, ART: ABNORMAL (ref 0–3)
POSITIVE BASE EXCESS, VEN: ABNORMAL MMOL/L (ref 0–2)
POTASSIUM SERPL-SCNC: 3.5 MMOL/L (ref 3.7–5.3)
POTASSIUM SERPL-SCNC: 3.6 MMOL/L (ref 3.7–5.3)
POTASSIUM SERPL-SCNC: 3.8 MMOL/L (ref 3.7–5.3)
POTASSIUM SERPL-SCNC: 3.9 MMOL/L (ref 3.7–5.3)
POTASSIUM SERPL-SCNC: 4.2 MMOL/L (ref 3.7–5.3)
POTASSIUM SERPL-SCNC: 4.3 MMOL/L (ref 3.7–5.3)
POTASSIUM SERPL-SCNC: 4.3 MMOL/L (ref 3.7–5.3)
POTASSIUM SERPL-SCNC: 4.4 MMOL/L (ref 3.7–5.3)
POTASSIUM SERPL-SCNC: 5.2 MMOL/L (ref 3.7–5.3)
POTASSIUM SERPL-SCNC: 5.3 MMOL/L (ref 3.7–5.3)
PRO-BNP: 6815 PG/ML
PRO-BNP: ABNORMAL PG/ML
PROCALCITONIN: 52.72 NG/ML
PROPOXYPHENE, URINE: ABNORMAL
PROPOXYPHENE, URINE: ABNORMAL
PROTEIN UA: NEGATIVE
PROTHROMBIN TIME: 12.3 SEC (ref 9.1–12.3)
PROTHROMBIN TIME: 15.9 SEC (ref 9.1–12.3)
PSV: ABNORMAL
PT. POSITION: ABNORMAL
RBC # BLD: 2.57 M/UL (ref 3.95–5.11)
RBC # BLD: 2.64 M/UL (ref 3.95–5.11)
RBC # BLD: 2.67 M/UL (ref 3.95–5.11)
RBC # BLD: 2.74 M/UL (ref 3.95–5.11)
RBC # BLD: 2.85 M/UL (ref 3.95–5.11)
RBC # BLD: 2.95 M/UL (ref 3.95–5.11)
RBC # BLD: 3.11 M/UL (ref 3.95–5.11)
RBC # BLD: 3.13 M/UL (ref 3.95–5.11)
RBC # BLD: 3.45 M/UL (ref 3.95–5.11)
RBC # BLD: 3.52 M/UL (ref 3.95–5.11)
RBC # BLD: ABNORMAL 10*6/UL
RBC UA: ABNORMAL /HPF (ref 0–4)
READ BACK: YES
RENAL EPITHELIAL, UA: ABNORMAL /HPF
RESPIRATORY RATE: ABNORMAL
SAMPLE SITE: ABNORMAL
SARS-COV-2, RAPID: NOT DETECTED
SEG NEUTROPHILS: 56 % (ref 36–65)
SEG NEUTROPHILS: 63 % (ref 36–66)
SEG NEUTROPHILS: 71 % (ref 36–66)
SEG NEUTROPHILS: 80 % (ref 36–66)
SEG NEUTROPHILS: 84 % (ref 36–66)
SEG NEUTROPHILS: 86 % (ref 36–66)
SEG NEUTROPHILS: 93 % (ref 36–66)
SEG NEUTROPHILS: 94 % (ref 36–66)
SEGMENTED NEUTROPHILS ABSOLUTE COUNT: 14.62 K/UL (ref 1.8–7.7)
SEGMENTED NEUTROPHILS ABSOLUTE COUNT: 17.05 K/UL (ref 1.8–7.7)
SEGMENTED NEUTROPHILS ABSOLUTE COUNT: 19.24 K/UL (ref 1.8–7.7)
SEGMENTED NEUTROPHILS ABSOLUTE COUNT: 26.66 K/UL (ref 1.8–7.7)
SEGMENTED NEUTROPHILS ABSOLUTE COUNT: 27.91 K/UL (ref 1.8–7.7)
SEGMENTED NEUTROPHILS ABSOLUTE COUNT: 4.65 K/UL (ref 1.5–8.1)
SEGMENTED NEUTROPHILS ABSOLUTE COUNT: 62.61 K/UL (ref 1.8–7.7)
SEGMENTED NEUTROPHILS ABSOLUTE COUNT: 71.92 K/UL (ref 1.8–7.7)
SERUM OSMOLALITY: 283 MOSM/KG (ref 275–295)
SET RATE: ABNORMAL
SODIUM BLD-SCNC: 121 MMOL/L (ref 135–144)
SODIUM BLD-SCNC: 122 MMOL/L (ref 135–144)
SODIUM BLD-SCNC: 122 MMOL/L (ref 135–144)
SODIUM BLD-SCNC: 125 MMOL/L (ref 135–144)
SODIUM BLD-SCNC: 126 MMOL/L (ref 135–144)
SODIUM BLD-SCNC: 130 MMOL/L (ref 135–144)
SODIUM BLD-SCNC: 135 MMOL/L (ref 135–144)
SODIUM BLD-SCNC: 139 MMOL/L (ref 135–144)
SODIUM BLD-SCNC: 141 MMOL/L (ref 135–144)
SODIUM BLD-SCNC: 143 MMOL/L (ref 135–144)
SODIUM BLD-SCNC: 143 MMOL/L (ref 135–144)
SODIUM BLD-SCNC: 146 MMOL/L (ref 135–144)
SODIUM,UR: 79 MMOL/L
SPECIFIC GRAVITY UA: 1.03 (ref 1–1.03)
SPECIMEN DESCRIPTION: NORMAL
SPECIMEN DESCRIPTION: NORMAL
TCO2 (CALC), ART: ABNORMAL MMOL/L (ref 22–29)
TEST INFORMATION: ABNORMAL
TEST INFORMATION: ABNORMAL
TEXT FOR RESPIRATORY: ABNORMAL
TOTAL HB: ABNORMAL G/DL (ref 12–16)
TOTAL PROTEIN: 3.3 G/DL (ref 6.4–8.3)
TOTAL PROTEIN: 3.7 G/DL (ref 6.4–8.3)
TOTAL PROTEIN: 4.1 G/DL (ref 6.4–8.3)
TOTAL PROTEIN: 4.8 G/DL (ref 6.4–8.3)
TOTAL PROTEIN: 5.5 G/DL (ref 6.4–8.3)
TOTAL RATE: ABNORMAL
TRANSFUSION STATUS: NORMAL
TRICHOMONAS: ABNORMAL
TRICYCLIC ANTIDEPRESSANTS, UR: ABNORMAL
TRICYCLIC ANTIDEPRESSANTS, UR: ABNORMAL
TROPONIN INTERP: ABNORMAL
TROPONIN INTERP: NORMAL
TROPONIN T: ABNORMAL NG/ML
TROPONIN T: NORMAL NG/ML
TROPONIN, HIGH SENSITIVITY: 140 NG/L (ref 0–14)
TROPONIN, HIGH SENSITIVITY: 148 NG/L (ref 0–14)
TROPONIN, HIGH SENSITIVITY: 177 NG/L (ref 0–14)
TROPONIN, HIGH SENSITIVITY: 180 NG/L (ref 0–14)
TROPONIN, HIGH SENSITIVITY: 208 NG/L (ref 0–14)
TROPONIN, HIGH SENSITIVITY: 219 NG/L (ref 0–14)
TROPONIN, HIGH SENSITIVITY: 220 NG/L (ref 0–14)
TROPONIN, HIGH SENSITIVITY: 275 NG/L (ref 0–14)
TROPONIN, HIGH SENSITIVITY: 297 NG/L (ref 0–14)
TROPONIN, HIGH SENSITIVITY: 315 NG/L (ref 0–14)
TROPONIN, HIGH SENSITIVITY: 7 NG/L (ref 0–14)
TSH SERPL DL<=0.05 MIU/L-ACNC: 2.02 MIU/L (ref 0.3–5)
TURBIDITY: CLEAR
UNIT DIVISION: 0
UNIT NUMBER: NORMAL
URINE HGB: NEGATIVE
UROBILINOGEN, URINE: NORMAL
VANCOMYCIN TROUGH DATE LAST DOSE: NORMAL
VANCOMYCIN TROUGH DOSE AMOUNT: NORMAL
VANCOMYCIN TROUGH TIME LAST DOSE: NORMAL
VANCOMYCIN TROUGH: 13.3 UG/ML (ref 10–20)
VITAMIN D 25-HYDROXY: 15.4 NG/ML (ref 30–100)
VT: ABNORMAL
WBC # BLD: 20.3 K/UL (ref 3.5–11.3)
WBC # BLD: 20.6 K/UL (ref 3.5–11.3)
WBC # BLD: 20.7 K/UL (ref 3.5–11.3)
WBC # BLD: 31 K/UL (ref 3.5–11.3)
WBC # BLD: 44.3 K/UL (ref 3.5–11.3)
WBC # BLD: 6.3 K/UL (ref 3.5–11.3)
WBC # BLD: 66.6 K/UL (ref 3.5–11.3)
WBC # BLD: 8.3 K/UL (ref 3.5–11.3)
WBC # BLD: 89.9 K/UL (ref 3.5–11.3)
WBC # BLD: 9.1 K/UL (ref 3.5–11.3)
WBC # BLD: ABNORMAL 10*3/UL
WBC UA: ABNORMAL /HPF (ref 0–5)
YEAST: ABNORMAL

## 2021-01-01 PROCEDURE — 2500000003 HC RX 250 WO HCPCS: Performed by: STUDENT IN AN ORGANIZED HEALTH CARE EDUCATION/TRAINING PROGRAM

## 2021-01-01 PROCEDURE — 6360000002 HC RX W HCPCS: Performed by: STUDENT IN AN ORGANIZED HEALTH CARE EDUCATION/TRAINING PROGRAM

## 2021-01-01 PROCEDURE — 85014 HEMATOCRIT: CPT

## 2021-01-01 PROCEDURE — 71260 CT THORAX DX C+: CPT

## 2021-01-01 PROCEDURE — 84520 ASSAY OF UREA NITROGEN: CPT

## 2021-01-01 PROCEDURE — 2580000003 HC RX 258: Performed by: STUDENT IN AN ORGANIZED HEALTH CARE EDUCATION/TRAINING PROGRAM

## 2021-01-01 PROCEDURE — 83735 ASSAY OF MAGNESIUM: CPT

## 2021-01-01 PROCEDURE — 2580000003 HC RX 258: Performed by: SPECIALIST

## 2021-01-01 PROCEDURE — 2W6LX0Z TRACTION OF RIGHT LOWER EXTREMITY USING TRACTION APPARATUS: ICD-10-PCS | Performed by: ORTHOPAEDIC SURGERY

## 2021-01-01 PROCEDURE — 82805 BLOOD GASES W/O2 SATURATION: CPT

## 2021-01-01 PROCEDURE — 6360000002 HC RX W HCPCS

## 2021-01-01 PROCEDURE — 83935 ASSAY OF URINE OSMOLALITY: CPT

## 2021-01-01 PROCEDURE — 80076 HEPATIC FUNCTION PANEL: CPT

## 2021-01-01 PROCEDURE — 85018 HEMOGLOBIN: CPT

## 2021-01-01 PROCEDURE — 36415 COLL VENOUS BLD VENIPUNCTURE: CPT

## 2021-01-01 PROCEDURE — 86901 BLOOD TYPING SEROLOGIC RH(D): CPT

## 2021-01-01 PROCEDURE — 83050 HGB METHEMOGLOBIN QUAN: CPT

## 2021-01-01 PROCEDURE — 80053 COMPREHEN METABOLIC PANEL: CPT

## 2021-01-01 PROCEDURE — A9576 INJ PROHANCE MULTIPACK: HCPCS | Performed by: NURSE PRACTITIONER

## 2021-01-01 PROCEDURE — 6360000002 HC RX W HCPCS: Performed by: NURSE PRACTITIONER

## 2021-01-01 PROCEDURE — 0BH18EZ INSERTION OF ENDOTRACHEAL AIRWAY INTO TRACHEA, VIA NATURAL OR ARTIFICIAL OPENING ENDOSCOPIC: ICD-10-PCS | Performed by: SURGERY

## 2021-01-01 PROCEDURE — 85384 FIBRINOGEN ACTIVITY: CPT

## 2021-01-01 PROCEDURE — 6370000000 HC RX 637 (ALT 250 FOR IP): Performed by: STUDENT IN AN ORGANIZED HEALTH CARE EDUCATION/TRAINING PROGRAM

## 2021-01-01 PROCEDURE — 36430 TRANSFUSION BLD/BLD COMPNT: CPT

## 2021-01-01 PROCEDURE — 2500000003 HC RX 250 WO HCPCS: Performed by: SPECIALIST

## 2021-01-01 PROCEDURE — 82330 ASSAY OF CALCIUM: CPT

## 2021-01-01 PROCEDURE — 80307 DRUG TEST PRSMV CHEM ANLYZR: CPT

## 2021-01-01 PROCEDURE — 3209999900 CT THORACIC SPINE TRAUMA RECONSTRUCTION

## 2021-01-01 PROCEDURE — 99222 1ST HOSP IP/OBS MODERATE 55: CPT | Performed by: NEUROLOGICAL SURGERY

## 2021-01-01 PROCEDURE — 85610 PROTHROMBIN TIME: CPT

## 2021-01-01 PROCEDURE — 36556 INSERT NON-TUNNEL CV CATH: CPT

## 2021-01-01 PROCEDURE — 2700000000 HC OXYGEN THERAPY PER DAY

## 2021-01-01 PROCEDURE — 94640 AIRWAY INHALATION TREATMENT: CPT

## 2021-01-01 PROCEDURE — 82947 ASSAY GLUCOSE BLOOD QUANT: CPT

## 2021-01-01 PROCEDURE — 85055 RETICULATED PLATELET ASSAY: CPT

## 2021-01-01 PROCEDURE — 71045 X-RAY EXAM CHEST 1 VIEW: CPT

## 2021-01-01 PROCEDURE — 92950 HEART/LUNG RESUSCITATION CPR: CPT

## 2021-01-01 PROCEDURE — 82803 BLOOD GASES ANY COMBINATION: CPT

## 2021-01-01 PROCEDURE — 6370000000 HC RX 637 (ALT 250 FOR IP): Performed by: NURSE PRACTITIONER

## 2021-01-01 PROCEDURE — 93010 ELECTROCARDIOGRAM REPORT: CPT | Performed by: INTERNAL MEDICINE

## 2021-01-01 PROCEDURE — 2709999900 HC NON-CHARGEABLE SUPPLY: Performed by: ORTHOPAEDIC SURGERY

## 2021-01-01 PROCEDURE — 2500000003 HC RX 250 WO HCPCS

## 2021-01-01 PROCEDURE — 80048 BASIC METABOLIC PNL TOTAL CA: CPT

## 2021-01-01 PROCEDURE — 37799 UNLISTED PX VASCULAR SURGERY: CPT

## 2021-01-01 PROCEDURE — 73562 X-RAY EXAM OF KNEE 3: CPT

## 2021-01-01 PROCEDURE — 73590 X-RAY EXAM OF LOWER LEG: CPT

## 2021-01-01 PROCEDURE — 85025 COMPLETE CBC W/AUTO DIFF WBC: CPT

## 2021-01-01 PROCEDURE — 2500000003 HC RX 250 WO HCPCS: Performed by: SURGERY

## 2021-01-01 PROCEDURE — 84100 ASSAY OF PHOSPHORUS: CPT

## 2021-01-01 PROCEDURE — 3600000004 HC SURGERY LEVEL 4 BASE: Performed by: ORTHOPAEDIC SURGERY

## 2021-01-01 PROCEDURE — 80051 ELECTROLYTE PANEL: CPT

## 2021-01-01 PROCEDURE — 73552 X-RAY EXAM OF FEMUR 2/>: CPT

## 2021-01-01 PROCEDURE — 84300 ASSAY OF URINE SODIUM: CPT

## 2021-01-01 PROCEDURE — 81001 URINALYSIS AUTO W/SCOPE: CPT

## 2021-01-01 PROCEDURE — 87086 URINE CULTURE/COLONY COUNT: CPT

## 2021-01-01 PROCEDURE — 82140 ASSAY OF AMMONIA: CPT

## 2021-01-01 PROCEDURE — 2580000003 HC RX 258

## 2021-01-01 PROCEDURE — 72197 MRI PELVIS W/O & W/DYE: CPT

## 2021-01-01 PROCEDURE — 84145 PROCALCITONIN (PCT): CPT

## 2021-01-01 PROCEDURE — 85730 THROMBOPLASTIN TIME PARTIAL: CPT

## 2021-01-01 PROCEDURE — 84484 ASSAY OF TROPONIN QUANT: CPT

## 2021-01-01 PROCEDURE — 72125 CT NECK SPINE W/O DYE: CPT

## 2021-01-01 PROCEDURE — 86920 COMPATIBILITY TEST SPIN: CPT

## 2021-01-01 PROCEDURE — 83880 ASSAY OF NATRIURETIC PEPTIDE: CPT

## 2021-01-01 PROCEDURE — 3700000000 HC ANESTHESIA ATTENDED CARE: Performed by: ORTHOPAEDIC SURGERY

## 2021-01-01 PROCEDURE — 94003 VENT MGMT INPAT SUBQ DAY: CPT

## 2021-01-01 PROCEDURE — 87635 SARS-COV-2 COVID-19 AMP PRB: CPT

## 2021-01-01 PROCEDURE — 3600000014 HC SURGERY LEVEL 4 ADDTL 15MIN: Performed by: ORTHOPAEDIC SURGERY

## 2021-01-01 PROCEDURE — 73700 CT LOWER EXTREMITY W/O DYE: CPT

## 2021-01-01 PROCEDURE — 6360000002 HC RX W HCPCS: Performed by: ANESTHESIOLOGY

## 2021-01-01 PROCEDURE — 2580000003 HC RX 258: Performed by: INTERNAL MEDICINE

## 2021-01-01 PROCEDURE — 73502 X-RAY EXAM HIP UNI 2-3 VIEWS: CPT

## 2021-01-01 PROCEDURE — 3700000001 HC ADD 15 MINUTES (ANESTHESIA): Performed by: ORTHOPAEDIC SURGERY

## 2021-01-01 PROCEDURE — 94761 N-INVAS EAR/PLS OXIMETRY MLT: CPT

## 2021-01-01 PROCEDURE — P9041 ALBUMIN (HUMAN),5%, 50ML: HCPCS | Performed by: STUDENT IN AN ORGANIZED HEALTH CARE EDUCATION/TRAINING PROGRAM

## 2021-01-01 PROCEDURE — 70450 CT HEAD/BRAIN W/O DYE: CPT

## 2021-01-01 PROCEDURE — 82010 KETONE BODYS QUAN: CPT

## 2021-01-01 PROCEDURE — 1200000000 HC SEMI PRIVATE

## 2021-01-01 PROCEDURE — 86850 RBC ANTIBODY SCREEN: CPT

## 2021-01-01 PROCEDURE — 83930 ASSAY OF BLOOD OSMOLALITY: CPT

## 2021-01-01 PROCEDURE — P9016 RBC LEUKOCYTES REDUCED: HCPCS

## 2021-01-01 PROCEDURE — 2580000003 HC RX 258: Performed by: NURSE PRACTITIONER

## 2021-01-01 PROCEDURE — 93306 TTE W/DOPPLER COMPLETE: CPT

## 2021-01-01 PROCEDURE — 94645 CONT INHLJ TX EACH ADDL HOUR: CPT

## 2021-01-01 PROCEDURE — 99232 SBSQ HOSP IP/OBS MODERATE 35: CPT | Performed by: INTERNAL MEDICINE

## 2021-01-01 PROCEDURE — 86900 BLOOD TYPING SEROLOGIC ABO: CPT

## 2021-01-01 PROCEDURE — 36600 WITHDRAWAL OF ARTERIAL BLOOD: CPT

## 2021-01-01 PROCEDURE — G0480 DRUG TEST DEF 1-7 CLASSES: HCPCS

## 2021-01-01 PROCEDURE — 27513 TREATMENT OF THIGH FRACTURE: CPT | Performed by: ORTHOPAEDIC SURGERY

## 2021-01-01 PROCEDURE — 6360000004 HC RX CONTRAST MEDICATION: Performed by: NURSE PRACTITIONER

## 2021-01-01 PROCEDURE — 74018 RADEX ABDOMEN 1 VIEW: CPT

## 2021-01-01 PROCEDURE — 2000000000 HC ICU R&B

## 2021-01-01 PROCEDURE — 83605 ASSAY OF LACTIC ACID: CPT

## 2021-01-01 PROCEDURE — 82565 ASSAY OF CREATININE: CPT

## 2021-01-01 PROCEDURE — 84703 CHORIONIC GONADOTROPIN ASSAY: CPT

## 2021-01-01 PROCEDURE — 99233 SBSQ HOSP IP/OBS HIGH 50: CPT | Performed by: INTERNAL MEDICINE

## 2021-01-01 PROCEDURE — 82306 VITAMIN D 25 HYDROXY: CPT

## 2021-01-01 PROCEDURE — 6360000004 HC RX CONTRAST MEDICATION: Performed by: STUDENT IN AN ORGANIZED HEALTH CARE EDUCATION/TRAINING PROGRAM

## 2021-01-01 PROCEDURE — 87040 BLOOD CULTURE FOR BACTERIA: CPT

## 2021-01-01 PROCEDURE — C1769 GUIDE WIRE: HCPCS | Performed by: ORTHOPAEDIC SURGERY

## 2021-01-01 PROCEDURE — 97535 SELF CARE MNGMENT TRAINING: CPT

## 2021-01-01 PROCEDURE — 97530 THERAPEUTIC ACTIVITIES: CPT

## 2021-01-01 PROCEDURE — 84295 ASSAY OF SERUM SODIUM: CPT

## 2021-01-01 PROCEDURE — 94644 CONT INHLJ TX 1ST HOUR: CPT

## 2021-01-01 PROCEDURE — 82248 BILIRUBIN DIRECT: CPT

## 2021-01-01 PROCEDURE — 85027 COMPLETE CBC AUTOMATED: CPT

## 2021-01-01 PROCEDURE — 99221 1ST HOSP IP/OBS SF/LOW 40: CPT | Performed by: INTERNAL MEDICINE

## 2021-01-01 PROCEDURE — 51702 INSERT TEMP BLADDER CATH: CPT

## 2021-01-01 PROCEDURE — 94002 VENT MGMT INPAT INIT DAY: CPT

## 2021-01-01 PROCEDURE — 99285 EMERGENCY DEPT VISIT HI MDM: CPT

## 2021-01-01 PROCEDURE — P9045 ALBUMIN (HUMAN), 5%, 250 ML: HCPCS | Performed by: SPECIALIST

## 2021-01-01 PROCEDURE — 99222 1ST HOSP IP/OBS MODERATE 55: CPT | Performed by: FAMILY MEDICINE

## 2021-01-01 PROCEDURE — 7100000001 HC PACU RECOVERY - ADDTL 15 MIN: Performed by: ORTHOPAEDIC SURGERY

## 2021-01-01 PROCEDURE — 3209999900 CT LUMBAR SPINE TRAUMA RECONSTRUCTION

## 2021-01-01 PROCEDURE — 6360000002 HC RX W HCPCS: Performed by: SPECIALIST

## 2021-01-01 PROCEDURE — 93005 ELECTROCARDIOGRAM TRACING: CPT | Performed by: SURGERY

## 2021-01-01 PROCEDURE — 7100000000 HC PACU RECOVERY - FIRST 15 MIN: Performed by: ORTHOPAEDIC SURGERY

## 2021-01-01 PROCEDURE — 2580000003 HC RX 258: Performed by: ORTHOPAEDIC SURGERY

## 2021-01-01 PROCEDURE — APPSS15 APP SPLIT SHARED TIME 0-15 MINUTES: Performed by: NURSE PRACTITIONER

## 2021-01-01 PROCEDURE — 2720000010 HC SURG SUPPLY STERILE: Performed by: ORTHOPAEDIC SURGERY

## 2021-01-01 PROCEDURE — 97162 PT EVAL MOD COMPLEX 30 MIN: CPT

## 2021-01-01 PROCEDURE — 82533 TOTAL CORTISOL: CPT

## 2021-01-01 PROCEDURE — 99221 1ST HOSP IP/OBS SF/LOW 40: CPT | Performed by: ORTHOPAEDIC SURGERY

## 2021-01-01 PROCEDURE — 2W5LX0Z REMOVAL OF TRACTION APPARATUS ON RIGHT LOWER EXTREMITY: ICD-10-PCS | Performed by: ORTHOPAEDIC SURGERY

## 2021-01-01 PROCEDURE — 85379 FIBRIN DEGRADATION QUANT: CPT

## 2021-01-01 PROCEDURE — 5A1955Z RESPIRATORY VENTILATION, GREATER THAN 96 CONSECUTIVE HOURS: ICD-10-PCS | Performed by: SURGERY

## 2021-01-01 PROCEDURE — 6370000000 HC RX 637 (ALT 250 FOR IP): Performed by: SURGERY

## 2021-01-01 PROCEDURE — 06HY33Z INSERTION OF INFUSION DEVICE INTO LOWER VEIN, PERCUTANEOUS APPROACH: ICD-10-PCS | Performed by: SURGERY

## 2021-01-01 PROCEDURE — 80202 ASSAY OF VANCOMYCIN: CPT

## 2021-01-01 PROCEDURE — 6360000004 HC RX CONTRAST MEDICATION: Performed by: SURGERY

## 2021-01-01 PROCEDURE — 97116 GAIT TRAINING THERAPY: CPT

## 2021-01-01 PROCEDURE — C1713 ANCHOR/SCREW BN/BN,TIS/BN: HCPCS | Performed by: ORTHOPAEDIC SURGERY

## 2021-01-01 PROCEDURE — 0QSB06Z REPOSITION RIGHT LOWER FEMUR WITH INTRAMEDULLARY INTERNAL FIXATION DEVICE, OPEN APPROACH: ICD-10-PCS | Performed by: ORTHOPAEDIC SURGERY

## 2021-01-01 PROCEDURE — 93005 ELECTROCARDIOGRAM TRACING: CPT | Performed by: STUDENT IN AN ORGANIZED HEALTH CARE EDUCATION/TRAINING PROGRAM

## 2021-01-01 PROCEDURE — 2580000003 HC RX 258: Performed by: SURGERY

## 2021-01-01 PROCEDURE — 72158 MRI LUMBAR SPINE W/O & W/DYE: CPT

## 2021-01-01 PROCEDURE — 84443 ASSAY THYROID STIM HORMONE: CPT

## 2021-01-01 PROCEDURE — 3209999900 FLUORO FOR SURGICAL PROCEDURES

## 2021-01-01 PROCEDURE — 97166 OT EVAL MOD COMPLEX 45 MIN: CPT

## 2021-01-01 PROCEDURE — 93005 ELECTROCARDIOGRAM TRACING: CPT | Performed by: INTERNAL MEDICINE

## 2021-01-01 DEVICE — LOCKING SCREW, FULLY THREADED: Type: IMPLANTABLE DEVICE | Site: FEMUR | Status: FUNCTIONAL

## 2021-01-01 DEVICE — CONDYLE SCREW NUT: Type: IMPLANTABLE DEVICE | Site: FEMUR | Status: FUNCTIONAL

## 2021-01-01 DEVICE — CONDYLE SCREW: Type: IMPLANTABLE DEVICE | Site: FEMUR | Status: FUNCTIONAL

## 2021-01-01 DEVICE — SUPRACONDYLAR NAIL
Type: IMPLANTABLE DEVICE | Site: FEMUR | Status: FUNCTIONAL
Brand: T2

## 2021-01-01 DEVICE — CANCELLOUS SCREW
Type: IMPLANTABLE DEVICE | Site: FEMUR | Status: FUNCTIONAL
Brand: AXSOS

## 2021-01-01 DEVICE — CANNULATED SCREW
Type: IMPLANTABLE DEVICE | Site: FEMUR | Status: FUNCTIONAL
Brand: ASNIS

## 2021-01-01 RX ORDER — DEXTROSE MONOHYDRATE 50 MG/ML
100 INJECTION, SOLUTION INTRAVENOUS PRN
Status: DISCONTINUED | OUTPATIENT
Start: 2021-01-01 | End: 2021-01-01

## 2021-01-01 RX ORDER — SODIUM CHLORIDE 0.9 % (FLUSH) 0.9 %
5-40 SYRINGE (ML) INJECTION PRN
Status: DISCONTINUED | OUTPATIENT
Start: 2021-01-01 | End: 2021-01-01

## 2021-01-01 RX ORDER — GLYCOPYRROLATE 1 MG/5 ML
SYRINGE (ML) INTRAVENOUS PRN
Status: DISCONTINUED | OUTPATIENT
Start: 2021-01-01 | End: 2021-01-01 | Stop reason: SDUPTHER

## 2021-01-01 RX ORDER — CALCIUM GLUCONATE 20 MG/ML
1000 INJECTION, SOLUTION INTRAVENOUS ONCE
Status: COMPLETED | OUTPATIENT
Start: 2021-01-01 | End: 2021-01-01

## 2021-01-01 RX ORDER — DEXTROSE MONOHYDRATE 100 MG/ML
INJECTION, SOLUTION INTRAVENOUS CONTINUOUS
Status: DISCONTINUED | OUTPATIENT
Start: 2021-01-01 | End: 2021-01-01

## 2021-01-01 RX ORDER — ALBUMIN, HUMAN INJ 5% 5 %
25 SOLUTION INTRAVENOUS ONCE
Status: COMPLETED | OUTPATIENT
Start: 2021-01-01 | End: 2021-01-01

## 2021-01-01 RX ORDER — SODIUM CHLORIDE 9 MG/ML
INJECTION, SOLUTION INTRAVENOUS PRN
Status: DISCONTINUED | OUTPATIENT
Start: 2021-01-01 | End: 2021-01-01

## 2021-01-01 RX ORDER — PROMETHAZINE HYDROCHLORIDE 25 MG/ML
6.25 INJECTION, SOLUTION INTRAMUSCULAR; INTRAVENOUS
Status: DISCONTINUED | OUTPATIENT
Start: 2021-01-01 | End: 2021-01-01

## 2021-01-01 RX ORDER — 0.9 % SODIUM CHLORIDE 0.9 %
500 INTRAVENOUS SOLUTION INTRAVENOUS
Status: DISCONTINUED | OUTPATIENT
Start: 2021-01-01 | End: 2021-01-01

## 2021-01-01 RX ORDER — FUROSEMIDE 10 MG/ML
40 INJECTION INTRAMUSCULAR; INTRAVENOUS ONCE
Status: COMPLETED | OUTPATIENT
Start: 2021-01-01 | End: 2021-01-01

## 2021-01-01 RX ORDER — CALCIUM CHLORIDE 100 MG/ML
1000 INJECTION INTRAVENOUS; INTRAVENTRICULAR ONCE
Status: COMPLETED | OUTPATIENT
Start: 2021-01-01 | End: 2021-01-01

## 2021-01-01 RX ORDER — SODIUM CHLORIDE 9 MG/ML
INJECTION, SOLUTION INTRAVENOUS CONTINUOUS
Status: DISCONTINUED | OUTPATIENT
Start: 2021-01-01 | End: 2021-01-01

## 2021-01-01 RX ORDER — OXYCODONE HYDROCHLORIDE 5 MG/1
5 TABLET ORAL EVERY 6 HOURS PRN
Status: DISCONTINUED | OUTPATIENT
Start: 2021-01-01 | End: 2021-01-01

## 2021-01-01 RX ORDER — POLYETHYLENE GLYCOL 3350 17 G/17G
17 POWDER, FOR SOLUTION ORAL DAILY
Status: DISCONTINUED | OUTPATIENT
Start: 2021-01-01 | End: 2021-01-01

## 2021-01-01 RX ORDER — FENTANYL CITRATE 50 UG/ML
50 INJECTION, SOLUTION INTRAMUSCULAR; INTRAVENOUS ONCE
Status: COMPLETED | OUTPATIENT
Start: 2021-01-01 | End: 2021-01-01

## 2021-01-01 RX ORDER — SODIUM CHLORIDE, SODIUM LACTATE, POTASSIUM CHLORIDE, AND CALCIUM CHLORIDE .6; .31; .03; .02 G/100ML; G/100ML; G/100ML; G/100ML
500 INJECTION, SOLUTION INTRAVENOUS ONCE
Status: COMPLETED | OUTPATIENT
Start: 2021-01-01 | End: 2021-01-01

## 2021-01-01 RX ORDER — FENTANYL CITRATE 50 UG/ML
25 INJECTION, SOLUTION INTRAMUSCULAR; INTRAVENOUS EVERY 5 MIN PRN
Status: DISCONTINUED | OUTPATIENT
Start: 2021-01-01 | End: 2021-01-01

## 2021-01-01 RX ORDER — DEXTROSE MONOHYDRATE 25 G/50ML
12.5 INJECTION, SOLUTION INTRAVENOUS PRN
Status: DISCONTINUED | OUTPATIENT
Start: 2021-01-01 | End: 2021-01-01

## 2021-01-01 RX ORDER — ONDANSETRON 2 MG/ML
INJECTION INTRAMUSCULAR; INTRAVENOUS PRN
Status: DISCONTINUED | OUTPATIENT
Start: 2021-01-01 | End: 2021-01-01 | Stop reason: SDUPTHER

## 2021-01-01 RX ORDER — FENTANYL CITRATE 50 UG/ML
50 INJECTION, SOLUTION INTRAMUSCULAR; INTRAVENOUS
Status: DISCONTINUED | OUTPATIENT
Start: 2021-01-01 | End: 2021-01-01

## 2021-01-01 RX ORDER — SODIUM CHLORIDE, SODIUM LACTATE, POTASSIUM CHLORIDE, AND CALCIUM CHLORIDE .6; .31; .03; .02 G/100ML; G/100ML; G/100ML; G/100ML
1000 INJECTION, SOLUTION INTRAVENOUS ONCE
Status: COMPLETED | OUTPATIENT
Start: 2021-01-01 | End: 2021-01-01

## 2021-01-01 RX ORDER — OXYCODONE HYDROCHLORIDE AND ACETAMINOPHEN 5; 325 MG/1; MG/1
1 TABLET ORAL EVERY 4 HOURS PRN
Status: DISCONTINUED | OUTPATIENT
Start: 2021-01-01 | End: 2021-01-01

## 2021-01-01 RX ORDER — ALBUMIN, HUMAN INJ 5% 5 %
SOLUTION INTRAVENOUS PRN
Status: DISCONTINUED | OUTPATIENT
Start: 2021-01-01 | End: 2021-01-01 | Stop reason: SDUPTHER

## 2021-01-01 RX ORDER — METOPROLOL TARTRATE 5 MG/5ML
INJECTION INTRAVENOUS
Status: COMPLETED
Start: 2021-01-01 | End: 2021-01-01

## 2021-01-01 RX ORDER — SODIUM CHLORIDE 0.9 % (FLUSH) 0.9 %
5-40 SYRINGE (ML) INJECTION EVERY 12 HOURS SCHEDULED
Status: DISCONTINUED | OUTPATIENT
Start: 2021-01-01 | End: 2021-01-01

## 2021-01-01 RX ORDER — PROPOFOL 10 MG/ML
5-50 INJECTION, EMULSION INTRAVENOUS
Status: DISCONTINUED | OUTPATIENT
Start: 2021-01-01 | End: 2021-01-01

## 2021-01-01 RX ORDER — DIPHENHYDRAMINE HYDROCHLORIDE 50 MG/ML
12.5 INJECTION INTRAMUSCULAR; INTRAVENOUS
Status: DISCONTINUED | OUTPATIENT
Start: 2021-01-01 | End: 2021-01-01

## 2021-01-01 RX ORDER — 0.9 % SODIUM CHLORIDE 0.9 %
250 INTRAVENOUS SOLUTION INTRAVENOUS ONCE
Status: COMPLETED | OUTPATIENT
Start: 2021-01-01 | End: 2021-01-01

## 2021-01-01 RX ORDER — NOREPINEPHRINE BIT/0.9 % NACL 16MG/250ML
2-100 INFUSION BOTTLE (ML) INTRAVENOUS CONTINUOUS
Status: DISCONTINUED | OUTPATIENT
Start: 2021-01-01 | End: 2021-01-01 | Stop reason: HOSPADM

## 2021-01-01 RX ORDER — IPRATROPIUM BROMIDE AND ALBUTEROL SULFATE 2.5; .5 MG/3ML; MG/3ML
1 SOLUTION RESPIRATORY (INHALATION) EVERY 4 HOURS
Status: DISCONTINUED | OUTPATIENT
Start: 2021-01-01 | End: 2021-01-01

## 2021-01-01 RX ORDER — FENTANYL CITRATE 50 UG/ML
INJECTION, SOLUTION INTRAMUSCULAR; INTRAVENOUS PRN
Status: DISCONTINUED | OUTPATIENT
Start: 2021-01-01 | End: 2021-01-01 | Stop reason: SDUPTHER

## 2021-01-01 RX ORDER — NICOTINE POLACRILEX 4 MG
15 LOZENGE BUCCAL PRN
Status: DISCONTINUED | OUTPATIENT
Start: 2021-01-01 | End: 2021-01-01

## 2021-01-01 RX ORDER — ACETAMINOPHEN 650 MG/1
650 SUPPOSITORY RECTAL EVERY 4 HOURS PRN
Status: DISCONTINUED | OUTPATIENT
Start: 2021-01-01 | End: 2021-01-01

## 2021-01-01 RX ORDER — SODIUM CHLORIDE 9 MG/ML
25 INJECTION, SOLUTION INTRAVENOUS PRN
Status: DISCONTINUED | OUTPATIENT
Start: 2021-01-01 | End: 2021-01-01

## 2021-01-01 RX ORDER — LORAZEPAM 2 MG/ML
1 INJECTION INTRAMUSCULAR EVERY 4 HOURS PRN
Status: DISCONTINUED | OUTPATIENT
Start: 2021-01-01 | End: 2021-01-01

## 2021-01-01 RX ORDER — LIDOCAINE HYDROCHLORIDE 10 MG/ML
INJECTION, SOLUTION EPIDURAL; INFILTRATION; INTRACAUDAL; PERINEURAL PRN
Status: DISCONTINUED | OUTPATIENT
Start: 2021-01-01 | End: 2021-01-01 | Stop reason: SDUPTHER

## 2021-01-01 RX ORDER — LIDOCAINE HYDROCHLORIDE 10 MG/ML
INJECTION, SOLUTION INFILTRATION; PERINEURAL
Status: COMPLETED
Start: 2021-01-01 | End: 2021-01-01

## 2021-01-01 RX ORDER — HYDRALAZINE HYDROCHLORIDE 20 MG/ML
5 INJECTION INTRAMUSCULAR; INTRAVENOUS EVERY 10 MIN PRN
Status: DISCONTINUED | OUTPATIENT
Start: 2021-01-01 | End: 2021-01-01

## 2021-01-01 RX ORDER — OXYCODONE HYDROCHLORIDE 5 MG/1
10 TABLET ORAL EVERY 4 HOURS PRN
Status: DISCONTINUED | OUTPATIENT
Start: 2021-01-01 | End: 2021-01-01

## 2021-01-01 RX ORDER — POTASSIUM CHLORIDE 7.45 MG/ML
40 INJECTION INTRAVENOUS ONCE
Status: COMPLETED | OUTPATIENT
Start: 2021-01-01 | End: 2021-01-01

## 2021-01-01 RX ORDER — ONDANSETRON 2 MG/ML
4 INJECTION INTRAMUSCULAR; INTRAVENOUS
Status: DISCONTINUED | OUTPATIENT
Start: 2021-01-01 | End: 2021-01-01

## 2021-01-01 RX ORDER — SODIUM CHLORIDE 9 MG/ML
INJECTION, SOLUTION INTRAVENOUS PRN
Status: COMPLETED | OUTPATIENT
Start: 2021-01-01 | End: 2021-01-01

## 2021-01-01 RX ORDER — GABAPENTIN 300 MG/1
300 CAPSULE ORAL 3 TIMES DAILY
Status: DISCONTINUED | OUTPATIENT
Start: 2021-01-01 | End: 2021-01-01

## 2021-01-01 RX ORDER — SENNA AND DOCUSATE SODIUM 50; 8.6 MG/1; MG/1
1 TABLET, FILM COATED ORAL 2 TIMES DAILY
Status: DISCONTINUED | OUTPATIENT
Start: 2021-01-01 | End: 2021-01-01

## 2021-01-01 RX ORDER — MAGNESIUM HYDROXIDE 1200 MG/15ML
LIQUID ORAL CONTINUOUS PRN
Status: COMPLETED | OUTPATIENT
Start: 2021-01-01 | End: 2021-01-01

## 2021-01-01 RX ORDER — SODIUM CHLORIDE, SODIUM LACTATE, POTASSIUM CHLORIDE, AND CALCIUM CHLORIDE .6; .31; .03; .02 G/100ML; G/100ML; G/100ML; G/100ML
1000 INJECTION, SOLUTION INTRAVENOUS ONCE
Status: DISCONTINUED | OUTPATIENT
Start: 2021-01-01 | End: 2021-01-01

## 2021-01-01 RX ORDER — NEOSTIGMINE METHYLSULFATE 5 MG/5 ML
SYRINGE (ML) INTRAVENOUS PRN
Status: DISCONTINUED | OUTPATIENT
Start: 2021-01-01 | End: 2021-01-01 | Stop reason: SDUPTHER

## 2021-01-01 RX ORDER — LIDOCAINE HYDROCHLORIDE 10 MG/ML
5 INJECTION, SOLUTION INFILTRATION; PERINEURAL ONCE
Status: COMPLETED | OUTPATIENT
Start: 2021-01-01 | End: 2021-01-01

## 2021-01-01 RX ORDER — CALCIUM CHLORIDE 100 MG/ML
INJECTION INTRAVENOUS; INTRAVENTRICULAR PRN
Status: DISCONTINUED | OUTPATIENT
Start: 2021-01-01 | End: 2021-01-01 | Stop reason: SDUPTHER

## 2021-01-01 RX ORDER — METHOCARBAMOL 500 MG/1
750 TABLET, FILM COATED ORAL 3 TIMES DAILY
Status: DISCONTINUED | OUTPATIENT
Start: 2021-01-01 | End: 2021-01-01

## 2021-01-01 RX ORDER — CEFAZOLIN SODIUM 2 G/50ML
SOLUTION INTRAVENOUS PRN
Status: DISCONTINUED | OUTPATIENT
Start: 2021-01-01 | End: 2021-01-01 | Stop reason: SDUPTHER

## 2021-01-01 RX ORDER — FENTANYL CITRATE 50 UG/ML
50 INJECTION, SOLUTION INTRAMUSCULAR; INTRAVENOUS EVERY 5 MIN PRN
Status: DISCONTINUED | OUTPATIENT
Start: 2021-01-01 | End: 2021-01-01

## 2021-01-01 RX ORDER — LACTULOSE 10 G/15ML
20 SOLUTION ORAL 3 TIMES DAILY
Status: DISCONTINUED | OUTPATIENT
Start: 2021-01-01 | End: 2021-01-01

## 2021-01-01 RX ORDER — OXYCODONE HYDROCHLORIDE 5 MG/1
5 TABLET ORAL EVERY 4 HOURS PRN
Status: DISCONTINUED | OUTPATIENT
Start: 2021-01-01 | End: 2021-01-01

## 2021-01-01 RX ORDER — LORAZEPAM 2 MG/ML
1 INJECTION INTRAMUSCULAR
Status: DISCONTINUED | OUTPATIENT
Start: 2021-01-01 | End: 2021-01-01

## 2021-01-01 RX ORDER — MULTIVITAMIN WITH IRON
1 TABLET ORAL DAILY
Status: DISCONTINUED | OUTPATIENT
Start: 2021-01-01 | End: 2021-01-01

## 2021-01-01 RX ORDER — DEXTROSE MONOHYDRATE 25 G/50ML
INJECTION, SOLUTION INTRAVENOUS
Status: COMPLETED
Start: 2021-01-01 | End: 2021-01-01

## 2021-01-01 RX ORDER — GLYCOPYRROLATE 0.2 MG/ML
0.2 INJECTION INTRAMUSCULAR; INTRAVENOUS EVERY 4 HOURS PRN
Status: DISCONTINUED | OUTPATIENT
Start: 2021-01-01 | End: 2021-01-01 | Stop reason: HOSPADM

## 2021-01-01 RX ORDER — ROCURONIUM BROMIDE 10 MG/ML
INJECTION, SOLUTION INTRAVENOUS PRN
Status: DISCONTINUED | OUTPATIENT
Start: 2021-01-01 | End: 2021-01-01 | Stop reason: SDUPTHER

## 2021-01-01 RX ORDER — 0.9 % SODIUM CHLORIDE 0.9 %
250 INTRAVENOUS SOLUTION INTRAVENOUS ONCE
Status: DISCONTINUED | OUTPATIENT
Start: 2021-01-01 | End: 2021-01-01

## 2021-01-01 RX ORDER — THIAMINE HYDROCHLORIDE 100 MG/ML
500 INJECTION, SOLUTION INTRAMUSCULAR; INTRAVENOUS EVERY 8 HOURS
Status: DISCONTINUED | OUTPATIENT
Start: 2021-01-01 | End: 2021-01-01

## 2021-01-01 RX ORDER — ACETAMINOPHEN 500 MG
1000 TABLET ORAL EVERY 8 HOURS SCHEDULED
Status: DISCONTINUED | OUTPATIENT
Start: 2021-01-01 | End: 2021-01-01

## 2021-01-01 RX ORDER — LABETALOL HYDROCHLORIDE 5 MG/ML
5 INJECTION, SOLUTION INTRAVENOUS EVERY 10 MIN PRN
Status: DISCONTINUED | OUTPATIENT
Start: 2021-01-01 | End: 2021-01-01

## 2021-01-01 RX ORDER — CEPHALEXIN 500 MG/1
500 CAPSULE ORAL EVERY 12 HOURS SCHEDULED
Status: DISCONTINUED | OUTPATIENT
Start: 2021-01-01 | End: 2021-01-01

## 2021-01-01 RX ORDER — MAGNESIUM SULFATE IN WATER 40 MG/ML
2000 INJECTION, SOLUTION INTRAVENOUS ONCE
Status: COMPLETED | OUTPATIENT
Start: 2021-01-01 | End: 2021-01-01

## 2021-01-01 RX ORDER — ERGOCALCIFEROL 1.25 MG/1
50000 CAPSULE ORAL WEEKLY
Qty: 8 CAPSULE | Refills: 0 | Status: SHIPPED | OUTPATIENT
Start: 2021-01-01 | End: 2021-06-11

## 2021-01-01 RX ORDER — PHENYLEPHRINE HYDROCHLORIDE 10 MG/ML
INJECTION INTRAVENOUS PRN
Status: DISCONTINUED | OUTPATIENT
Start: 2021-01-01 | End: 2021-01-01 | Stop reason: SDUPTHER

## 2021-01-01 RX ORDER — ONDANSETRON 2 MG/ML
4 INJECTION INTRAMUSCULAR; INTRAVENOUS EVERY 6 HOURS PRN
Status: DISCONTINUED | OUTPATIENT
Start: 2021-01-01 | End: 2021-01-01

## 2021-01-01 RX ORDER — POTASSIUM CHLORIDE 20 MEQ/1
40 TABLET, EXTENDED RELEASE ORAL ONCE
Status: COMPLETED | OUTPATIENT
Start: 2021-01-01 | End: 2021-01-01

## 2021-01-01 RX ORDER — PROPOFOL 10 MG/ML
INJECTION, EMULSION INTRAVENOUS PRN
Status: DISCONTINUED | OUTPATIENT
Start: 2021-01-01 | End: 2021-01-01 | Stop reason: SDUPTHER

## 2021-01-01 RX ORDER — FENTANYL CITRATE 50 UG/ML
INJECTION, SOLUTION INTRAMUSCULAR; INTRAVENOUS
Status: COMPLETED
Start: 2021-01-01 | End: 2021-01-01

## 2021-01-01 RX ADMIN — Medication 1 TABLET: at 08:30

## 2021-01-01 RX ADMIN — LACTULOSE 20 G: 20 SOLUTION ORAL at 21:10

## 2021-01-01 RX ADMIN — SODIUM CHLORIDE, PRESERVATIVE FREE 10 ML: 5 INJECTION INTRAVENOUS at 08:30

## 2021-01-01 RX ADMIN — METHOCARBAMOL TABLETS 750 MG: 500 TABLET, COATED ORAL at 12:25

## 2021-01-01 RX ADMIN — STANDARDIZED SENNA CONCENTRATE AND DOCUSATE SODIUM 1 TABLET: 8.6; 5 TABLET ORAL at 21:45

## 2021-01-01 RX ADMIN — Medication: at 22:56

## 2021-01-01 RX ADMIN — THIAMINE HYDROCHLORIDE 100 MG: 100 INJECTION, SOLUTION INTRAMUSCULAR; INTRAVENOUS at 16:28

## 2021-01-01 RX ADMIN — ACETAMINOPHEN 1000 MG: 500 TABLET ORAL at 13:19

## 2021-01-01 RX ADMIN — PIPERACILLIN AND TAZOBACTAM 4500 MG: 4; .5 INJECTION, POWDER, LYOPHILIZED, FOR SOLUTION INTRAVENOUS; PARENTERAL at 20:45

## 2021-01-01 RX ADMIN — OXYCODONE HYDROCHLORIDE 5 MG: 5 TABLET ORAL at 00:15

## 2021-01-01 RX ADMIN — METOPROLOL TARTRATE 2.5 MG: 5 INJECTION, SOLUTION INTRAVENOUS at 03:25

## 2021-01-01 RX ADMIN — CALCIUM GLUCONATE 1000 MG: 20 INJECTION, SOLUTION INTRAVENOUS at 08:30

## 2021-01-01 RX ADMIN — THIAMINE HYDROCHLORIDE 100 MG: 100 INJECTION, SOLUTION INTRAMUSCULAR; INTRAVENOUS at 16:04

## 2021-01-01 RX ADMIN — POTASSIUM BICARBONATE 40 MEQ: 782 TABLET, EFFERVESCENT ORAL at 08:29

## 2021-01-01 RX ADMIN — SODIUM BICARBONATE 50 MEQ: 84 INJECTION, SOLUTION INTRAVENOUS at 16:54

## 2021-01-01 RX ADMIN — VASOPRESSIN 0.04 UNITS/MIN: 20 INJECTION INTRAVENOUS at 00:57

## 2021-01-01 RX ADMIN — GABAPENTIN 300 MG: 300 CAPSULE ORAL at 21:07

## 2021-01-01 RX ADMIN — FENTANYL CITRATE 50 MCG: 50 INJECTION, SOLUTION INTRAMUSCULAR; INTRAVENOUS at 10:37

## 2021-01-01 RX ADMIN — LIDOCAINE HYDROCHLORIDE 5 ML: 10 INJECTION, SOLUTION INFILTRATION; PERINEURAL at 03:00

## 2021-01-01 RX ADMIN — CALCIUM CHLORIDE 1000 MG: 100 INJECTION, SOLUTION INTRAVENOUS; INTRAVENTRICULAR at 08:13

## 2021-01-01 RX ADMIN — EPINEPHRINE 6 MCG/MIN: 1 INJECTION PARENTERAL at 17:19

## 2021-01-01 RX ADMIN — Medication 4 MG/HR: at 00:36

## 2021-01-01 RX ADMIN — ENOXAPARIN SODIUM 30 MG: 30 INJECTION SUBCUTANEOUS at 08:43

## 2021-01-01 RX ADMIN — FENTANYL CITRATE 25 MCG: 50 INJECTION, SOLUTION INTRAMUSCULAR; INTRAVENOUS at 12:04

## 2021-01-01 RX ADMIN — Medication 8 MG/HR: at 11:50

## 2021-01-01 RX ADMIN — PHENYLEPHRINE HYDROCHLORIDE 100 MCG: 10 INJECTION INTRAVENOUS at 12:22

## 2021-01-01 RX ADMIN — METHOCARBAMOL TABLETS 750 MG: 500 TABLET, COATED ORAL at 08:28

## 2021-01-01 RX ADMIN — VANCOMYCIN HYDROCHLORIDE 750 MG: 10 INJECTION, POWDER, LYOPHILIZED, FOR SOLUTION INTRAVENOUS at 19:05

## 2021-01-01 RX ADMIN — POLYETHYLENE GLYCOL 3350 17 G: 17 POWDER, FOR SOLUTION ORAL at 09:43

## 2021-01-01 RX ADMIN — GABAPENTIN 300 MG: 300 CAPSULE ORAL at 14:28

## 2021-01-01 RX ADMIN — THIAMINE HYDROCHLORIDE 500 MG: 100 INJECTION, SOLUTION INTRAMUSCULAR; INTRAVENOUS at 23:27

## 2021-01-01 RX ADMIN — EPINEPHRINE 2 MCG/MIN: 1 INJECTION PARENTERAL at 05:55

## 2021-01-01 RX ADMIN — DEXTROSE MONOHYDRATE 12.5 G: 25 INJECTION, SOLUTION INTRAVENOUS at 04:43

## 2021-01-01 RX ADMIN — Medication 200 MCG/HR: at 18:14

## 2021-01-01 RX ADMIN — Medication 70 MCG/MIN: at 07:00

## 2021-01-01 RX ADMIN — SODIUM CHLORIDE, PRESERVATIVE FREE 10 ML: 5 INJECTION INTRAVENOUS at 21:10

## 2021-01-01 RX ADMIN — STANDARDIZED SENNA CONCENTRATE AND DOCUSATE SODIUM 1 TABLET: 8.6; 5 TABLET ORAL at 00:51

## 2021-01-01 RX ADMIN — ACETAMINOPHEN 1000 MG: 500 TABLET ORAL at 12:27

## 2021-01-01 RX ADMIN — FUROSEMIDE 40 MG: 10 INJECTION, SOLUTION INTRAMUSCULAR; INTRAVENOUS at 17:55

## 2021-01-01 RX ADMIN — POTASSIUM CHLORIDE 40 MEQ: 7.46 INJECTION, SOLUTION INTRAVENOUS at 20:45

## 2021-01-01 RX ADMIN — Medication 200 MCG/HR: at 08:19

## 2021-01-01 RX ADMIN — EPINEPHRINE 21 MCG/MIN: 1 INJECTION PARENTERAL at 08:03

## 2021-01-01 RX ADMIN — Medication: at 10:16

## 2021-01-01 RX ADMIN — IPRATROPIUM BROMIDE AND ALBUTEROL SULFATE 1 AMPULE: .5; 3 SOLUTION RESPIRATORY (INHALATION) at 07:51

## 2021-01-01 RX ADMIN — Medication 200 MCG/HR: at 03:20

## 2021-01-01 RX ADMIN — Medication 70 MCG/MIN: at 11:04

## 2021-01-01 RX ADMIN — PIPERACILLIN AND TAZOBACTAM 4500 MG: 4; .5 INJECTION, POWDER, LYOPHILIZED, FOR SOLUTION INTRAVENOUS; PARENTERAL at 05:50

## 2021-01-01 RX ADMIN — DEXTROSE MONOHYDRATE 100 ML/HR: 50 INJECTION, SOLUTION INTRAVENOUS at 03:33

## 2021-01-01 RX ADMIN — THIAMINE HYDROCHLORIDE 500 MG: 100 INJECTION, SOLUTION INTRAMUSCULAR; INTRAVENOUS at 00:15

## 2021-01-01 RX ADMIN — GABAPENTIN 300 MG: 300 CAPSULE ORAL at 23:37

## 2021-01-01 RX ADMIN — SODIUM CHLORIDE: 9 INJECTION, SOLUTION INTRAVENOUS at 06:06

## 2021-01-01 RX ADMIN — FENTANYL CITRATE 50 MCG: 50 INJECTION, SOLUTION INTRAMUSCULAR; INTRAVENOUS at 12:46

## 2021-01-01 RX ADMIN — Medication 200 MCG/HR: at 23:12

## 2021-01-01 RX ADMIN — POLYETHYLENE GLYCOL 3350 17 G: 17 POWDER, FOR SOLUTION ORAL at 09:03

## 2021-01-01 RX ADMIN — HYDROCORTISONE SODIUM SUCCINATE 100 MG: 100 INJECTION, POWDER, FOR SOLUTION INTRAMUSCULAR; INTRAVENOUS at 14:49

## 2021-01-01 RX ADMIN — PIPERACILLIN AND TAZOBACTAM 4500 MG: 4; .5 INJECTION, POWDER, LYOPHILIZED, FOR SOLUTION INTRAVENOUS; PARENTERAL at 20:14

## 2021-01-01 RX ADMIN — Medication: at 04:20

## 2021-01-01 RX ADMIN — ENOXAPARIN SODIUM 30 MG: 30 INJECTION SUBCUTANEOUS at 08:00

## 2021-01-01 RX ADMIN — HYDROCORTISONE SODIUM SUCCINATE 100 MG: 100 INJECTION, POWDER, FOR SOLUTION INTRAMUSCULAR; INTRAVENOUS at 13:25

## 2021-01-01 RX ADMIN — SODIUM CHLORIDE: 9 INJECTION, SOLUTION INTRAVENOUS at 20:51

## 2021-01-01 RX ADMIN — STANDARDIZED SENNA CONCENTRATE AND DOCUSATE SODIUM 1 TABLET: 8.6; 5 TABLET ORAL at 20:33

## 2021-01-01 RX ADMIN — METHOCARBAMOL TABLETS 750 MG: 500 TABLET, COATED ORAL at 21:07

## 2021-01-01 RX ADMIN — PHENYLEPHRINE HYDROCHLORIDE 200 MCG: 10 INJECTION INTRAVENOUS at 14:31

## 2021-01-01 RX ADMIN — THIAMINE HYDROCHLORIDE 500 MG: 100 INJECTION, SOLUTION INTRAMUSCULAR; INTRAVENOUS at 15:43

## 2021-01-01 RX ADMIN — THIAMINE HYDROCHLORIDE 500 MG: 100 INJECTION, SOLUTION INTRAMUSCULAR; INTRAVENOUS at 00:47

## 2021-01-01 RX ADMIN — FENTANYL CITRATE 50 MCG: 50 INJECTION, SOLUTION INTRAMUSCULAR; INTRAVENOUS at 21:17

## 2021-01-01 RX ADMIN — GABAPENTIN 300 MG: 300 CAPSULE ORAL at 08:02

## 2021-01-01 RX ADMIN — LIDOCAINE HYDROCHLORIDE 50 MG: 10 INJECTION, SOLUTION EPIDURAL; INFILTRATION; INTRACAUDAL; PERINEURAL at 12:04

## 2021-01-01 RX ADMIN — STANDARDIZED SENNA CONCENTRATE AND DOCUSATE SODIUM 1 TABLET: 8.6; 5 TABLET ORAL at 22:00

## 2021-01-01 RX ADMIN — PHENYLEPHRINE HYDROCHLORIDE 100 MCG/MIN: 10 INJECTION INTRAVENOUS at 14:41

## 2021-01-01 RX ADMIN — ENOXAPARIN SODIUM 30 MG: 30 INJECTION SUBCUTANEOUS at 20:33

## 2021-01-01 RX ADMIN — PIPERACILLIN AND TAZOBACTAM 4500 MG: 4; .5 INJECTION, POWDER, LYOPHILIZED, FOR SOLUTION INTRAVENOUS; PARENTERAL at 05:26

## 2021-01-01 RX ADMIN — GABAPENTIN 300 MG: 300 CAPSULE ORAL at 12:25

## 2021-01-01 RX ADMIN — POLYETHYLENE GLYCOL 3350 17 G: 17 POWDER, FOR SOLUTION ORAL at 09:57

## 2021-01-01 RX ADMIN — GABAPENTIN 300 MG: 300 CAPSULE ORAL at 13:10

## 2021-01-01 RX ADMIN — ENOXAPARIN SODIUM 30 MG: 30 INJECTION SUBCUTANEOUS at 21:45

## 2021-01-01 RX ADMIN — FAMOTIDINE 20 MG: 10 INJECTION INTRAVENOUS at 08:37

## 2021-01-01 RX ADMIN — OXYCODONE HYDROCHLORIDE 5 MG: 5 TABLET ORAL at 09:16

## 2021-01-01 RX ADMIN — ACETAMINOPHEN 1000 MG: 500 TABLET ORAL at 09:32

## 2021-01-01 RX ADMIN — EPINEPHRINE 4 MCG/MIN: 1 INJECTION PARENTERAL at 01:33

## 2021-01-01 RX ADMIN — Medication 200 MCG/HR: at 09:14

## 2021-01-01 RX ADMIN — Medication 50 MCG/MIN: at 06:10

## 2021-01-01 RX ADMIN — ACETAMINOPHEN 1000 MG: 500 TABLET ORAL at 22:41

## 2021-01-01 RX ADMIN — SODIUM CHLORIDE: 9 INJECTION, SOLUTION INTRAVENOUS at 11:39

## 2021-01-01 RX ADMIN — ACETAMINOPHEN 1000 MG: 500 TABLET ORAL at 05:51

## 2021-01-01 RX ADMIN — PIPERACILLIN AND TAZOBACTAM 4500 MG: 4; .5 INJECTION, POWDER, LYOPHILIZED, FOR SOLUTION INTRAVENOUS; PARENTERAL at 06:00

## 2021-01-01 RX ADMIN — SODIUM CHLORIDE, PRESERVATIVE FREE 10 ML: 5 INJECTION INTRAVENOUS at 08:01

## 2021-01-01 RX ADMIN — VANCOMYCIN HYDROCHLORIDE 750 MG: 10 INJECTION, POWDER, LYOPHILIZED, FOR SOLUTION INTRAVENOUS at 06:00

## 2021-01-01 RX ADMIN — OXYCODONE HYDROCHLORIDE 5 MG: 5 TABLET ORAL at 12:25

## 2021-01-01 RX ADMIN — Medication: at 08:59

## 2021-01-01 RX ADMIN — GADOTERIDOL 10 ML: 279.3 INJECTION, SOLUTION INTRAVENOUS at 13:04

## 2021-01-01 RX ADMIN — EPINEPHRINE 5 MCG/MIN: 1 INJECTION PARENTERAL at 07:39

## 2021-01-01 RX ADMIN — SODIUM CHLORIDE, PRESERVATIVE FREE 10 ML: 5 INJECTION INTRAVENOUS at 08:31

## 2021-01-01 RX ADMIN — SODIUM BICARBONATE: 84 INJECTION, SOLUTION INTRAVENOUS at 20:30

## 2021-01-01 RX ADMIN — EPINEPHRINE 12 MCG/MIN: 1 INJECTION PARENTERAL at 02:55

## 2021-01-01 RX ADMIN — STANDARDIZED SENNA CONCENTRATE AND DOCUSATE SODIUM 1 TABLET: 8.6; 5 TABLET ORAL at 08:01

## 2021-01-01 RX ADMIN — IPRATROPIUM BROMIDE AND ALBUTEROL SULFATE 1 AMPULE: .5; 3 SOLUTION RESPIRATORY (INHALATION) at 15:29

## 2021-01-01 RX ADMIN — SODIUM BICARBONATE 50 MEQ: 84 INJECTION, SOLUTION INTRAVENOUS at 16:52

## 2021-01-01 RX ADMIN — Medication 70 MCG/MIN: at 00:44

## 2021-01-01 RX ADMIN — FENTANYL CITRATE 50 MCG: 50 INJECTION, SOLUTION INTRAMUSCULAR; INTRAVENOUS at 00:48

## 2021-01-01 RX ADMIN — VASOPRESSIN 0.04 UNITS/MIN: 20 INJECTION INTRAVENOUS at 03:20

## 2021-01-01 RX ADMIN — VASOPRESSIN 0.04 UNITS/MIN: 20 INJECTION INTRAVENOUS at 11:18

## 2021-01-01 RX ADMIN — SODIUM CHLORIDE, POTASSIUM CHLORIDE, SODIUM LACTATE AND CALCIUM CHLORIDE 1000 ML: 600; 310; 30; 20 INJECTION, SOLUTION INTRAVENOUS at 12:00

## 2021-01-01 RX ADMIN — METHOCARBAMOL TABLETS 750 MG: 500 TABLET, COATED ORAL at 22:41

## 2021-01-01 RX ADMIN — SODIUM CHLORIDE: 9 INJECTION, SOLUTION INTRAVENOUS at 13:50

## 2021-01-01 RX ADMIN — Medication 200 MCG/HR: at 04:15

## 2021-01-01 RX ADMIN — HYDROCORTISONE SODIUM SUCCINATE 200 MG: 100 INJECTION, POWDER, FOR SOLUTION INTRAMUSCULAR; INTRAVENOUS at 01:55

## 2021-01-01 RX ADMIN — Medication 50 MCG/MIN: at 11:34

## 2021-01-01 RX ADMIN — Medication: at 13:37

## 2021-01-01 RX ADMIN — THIAMINE HYDROCHLORIDE 500 MG: 100 INJECTION, SOLUTION INTRAMUSCULAR; INTRAVENOUS at 09:01

## 2021-01-01 RX ADMIN — Medication 200 MCG/HR: at 13:17

## 2021-01-01 RX ADMIN — Medication 50 MCG/MIN: at 17:17

## 2021-01-01 RX ADMIN — PHENYLEPHRINE HYDROCHLORIDE 100 MCG: 10 INJECTION INTRAVENOUS at 12:38

## 2021-01-01 RX ADMIN — ENOXAPARIN SODIUM 30 MG: 30 INJECTION SUBCUTANEOUS at 08:26

## 2021-01-01 RX ADMIN — STANDARDIZED SENNA CONCENTRATE AND DOCUSATE SODIUM 1 TABLET: 8.6; 5 TABLET ORAL at 08:43

## 2021-01-01 RX ADMIN — FAMOTIDINE 20 MG: 10 INJECTION INTRAVENOUS at 08:30

## 2021-01-01 RX ADMIN — VASOPRESSIN 0.03 UNITS/MIN: 20 INJECTION INTRAVENOUS at 11:58

## 2021-01-01 RX ADMIN — ENOXAPARIN SODIUM 30 MG: 30 INJECTION SUBCUTANEOUS at 08:30

## 2021-01-01 RX ADMIN — VASOPRESSIN 0.04 UNITS/MIN: 20 INJECTION INTRAVENOUS at 17:31

## 2021-01-01 RX ADMIN — Medication 3 MG: at 15:48

## 2021-01-01 RX ADMIN — SODIUM CHLORIDE 250 ML: 0.9 INJECTION, SOLUTION INTRAVENOUS at 00:20

## 2021-01-01 RX ADMIN — SODIUM BICARBONATE: 84 INJECTION, SOLUTION INTRAVENOUS at 23:00

## 2021-01-01 RX ADMIN — Medication 0.4 MG: at 15:48

## 2021-01-01 RX ADMIN — OXYCODONE HYDROCHLORIDE 5 MG: 5 TABLET ORAL at 02:57

## 2021-01-01 RX ADMIN — Medication 200 MCG/HR: at 18:16

## 2021-01-01 RX ADMIN — SODIUM CHLORIDE, PRESERVATIVE FREE 10 ML: 5 INJECTION INTRAVENOUS at 20:32

## 2021-01-01 RX ADMIN — ENOXAPARIN SODIUM 30 MG: 30 INJECTION SUBCUTANEOUS at 20:14

## 2021-01-01 RX ADMIN — Medication 2 MCG/MIN: at 16:00

## 2021-01-01 RX ADMIN — PIPERACILLIN AND TAZOBACTAM 4500 MG: 4; .5 INJECTION, POWDER, LYOPHILIZED, FOR SOLUTION INTRAVENOUS; PARENTERAL at 21:45

## 2021-01-01 RX ADMIN — SODIUM CHLORIDE, POTASSIUM CHLORIDE, SODIUM LACTATE AND CALCIUM CHLORIDE 500 ML: 600; 310; 30; 20 INJECTION, SOLUTION INTRAVENOUS at 03:40

## 2021-01-01 RX ADMIN — IPRATROPIUM BROMIDE AND ALBUTEROL SULFATE 1 AMPULE: .5; 3 SOLUTION RESPIRATORY (INHALATION) at 12:11

## 2021-01-01 RX ADMIN — ACETAMINOPHEN 1000 MG: 500 TABLET ORAL at 12:25

## 2021-01-01 RX ADMIN — PROPOFOL 10 MCG/KG/MIN: 10 INJECTION, EMULSION INTRAVENOUS at 15:36

## 2021-01-01 RX ADMIN — ENOXAPARIN SODIUM 30 MG: 30 INJECTION SUBCUTANEOUS at 08:39

## 2021-01-01 RX ADMIN — DEXTROSE MONOHYDRATE 12.5 G: 25 INJECTION, SOLUTION INTRAVENOUS at 03:31

## 2021-01-01 RX ADMIN — THIAMINE HYDROCHLORIDE 500 MG: 100 INJECTION, SOLUTION INTRAMUSCULAR; INTRAVENOUS at 16:39

## 2021-01-01 RX ADMIN — PIPERACILLIN AND TAZOBACTAM 4500 MG: 4; .5 INJECTION, POWDER, LYOPHILIZED, FOR SOLUTION INTRAVENOUS; PARENTERAL at 13:48

## 2021-01-01 RX ADMIN — FENTANYL CITRATE 25 MCG: 50 INJECTION, SOLUTION INTRAMUSCULAR; INTRAVENOUS at 16:06

## 2021-01-01 RX ADMIN — THIAMINE HYDROCHLORIDE 100 MG: 100 INJECTION, SOLUTION INTRAMUSCULAR; INTRAVENOUS at 21:10

## 2021-01-01 RX ADMIN — FENTANYL CITRATE 25 MCG: 50 INJECTION, SOLUTION INTRAMUSCULAR; INTRAVENOUS at 12:10

## 2021-01-01 RX ADMIN — Medication 200 MCG/HR: at 22:50

## 2021-01-01 RX ADMIN — PIPERACILLIN AND TAZOBACTAM 4500 MG: 4; .5 INJECTION, POWDER, LYOPHILIZED, FOR SOLUTION INTRAVENOUS; PARENTERAL at 21:10

## 2021-01-01 RX ADMIN — ACETAMINOPHEN 1000 MG: 500 TABLET ORAL at 05:54

## 2021-01-01 RX ADMIN — Medication 50 MCG/MIN: at 21:09

## 2021-01-01 RX ADMIN — DEXTROSE MONOHYDRATE: 100 INJECTION, SOLUTION INTRAVENOUS at 07:00

## 2021-01-01 RX ADMIN — SODIUM CHLORIDE: 9 INJECTION, SOLUTION INTRAVENOUS at 11:15

## 2021-01-01 RX ADMIN — DEXTROSE MONOHYDRATE: 25 INJECTION, SOLUTION INTRAVENOUS at 04:45

## 2021-01-01 RX ADMIN — STANDARDIZED SENNA CONCENTRATE AND DOCUSATE SODIUM 1 TABLET: 8.6; 5 TABLET ORAL at 09:43

## 2021-01-01 RX ADMIN — VASOPRESSIN 0.04 UNITS/MIN: 20 INJECTION INTRAVENOUS at 06:30

## 2021-01-01 RX ADMIN — ACETAMINOPHEN 650 MG: 650 SUPPOSITORY RECTAL at 02:03

## 2021-01-01 RX ADMIN — ALBUMIN (HUMAN) 250 ML: 12.5 INJECTION, SOLUTION INTRAVENOUS at 15:04

## 2021-01-01 RX ADMIN — SODIUM CHLORIDE, PRESERVATIVE FREE 10 ML: 5 INJECTION INTRAVENOUS at 08:16

## 2021-01-01 RX ADMIN — STANDARDIZED SENNA CONCENTRATE AND DOCUSATE SODIUM 1 TABLET: 8.6; 5 TABLET ORAL at 20:14

## 2021-01-01 RX ADMIN — DEXTROSE MONOHYDRATE 12.5 G: 25 INJECTION, SOLUTION INTRAVENOUS at 06:10

## 2021-01-01 RX ADMIN — HYDROCORTISONE SODIUM SUCCINATE 50 MG: 100 INJECTION, POWDER, FOR SOLUTION INTRAMUSCULAR; INTRAVENOUS at 00:33

## 2021-01-01 RX ADMIN — ACETAMINOPHEN 1000 MG: 500 TABLET ORAL at 05:42

## 2021-01-01 RX ADMIN — FENTANYL CITRATE 50 MCG: 50 INJECTION, SOLUTION INTRAMUSCULAR; INTRAVENOUS at 19:28

## 2021-01-01 RX ADMIN — SODIUM BICARBONATE 50 MEQ: 84 INJECTION, SOLUTION INTRAVENOUS at 06:49

## 2021-01-01 RX ADMIN — Medication 75 MCG/MIN: at 17:33

## 2021-01-01 RX ADMIN — VASOPRESSIN 0.04 UNITS/MIN: 20 INJECTION INTRAVENOUS at 20:10

## 2021-01-01 RX ADMIN — CEPHALEXIN 500 MG: 500 CAPSULE ORAL at 21:07

## 2021-01-01 RX ADMIN — HYDROCORTISONE SODIUM SUCCINATE 100 MG: 100 INJECTION, POWDER, FOR SOLUTION INTRAMUSCULAR; INTRAVENOUS at 00:19

## 2021-01-01 RX ADMIN — DEXTROSE MONOHYDRATE 12.5 G: 25 INJECTION, SOLUTION INTRAVENOUS at 00:31

## 2021-01-01 RX ADMIN — Medication 200 MCG/HR: at 19:15

## 2021-01-01 RX ADMIN — IPRATROPIUM BROMIDE AND ALBUTEROL SULFATE 1 AMPULE: .5; 3 SOLUTION RESPIRATORY (INHALATION) at 11:44

## 2021-01-01 RX ADMIN — EPOPROSTENOL 50 NG/KG/MIN: 1.5 INJECTION, POWDER, LYOPHILIZED, FOR SOLUTION INTRAVENOUS at 21:13

## 2021-01-01 RX ADMIN — Medication: at 02:08

## 2021-01-01 RX ADMIN — METHOCARBAMOL TABLETS 750 MG: 500 TABLET, COATED ORAL at 08:02

## 2021-01-01 RX ADMIN — ENOXAPARIN SODIUM 30 MG: 30 INJECTION SUBCUTANEOUS at 08:29

## 2021-01-01 RX ADMIN — SODIUM CHLORIDE: 9 INJECTION, SOLUTION INTRAVENOUS at 18:10

## 2021-01-01 RX ADMIN — SODIUM CHLORIDE, PRESERVATIVE FREE 10 ML: 5 INJECTION INTRAVENOUS at 08:18

## 2021-01-01 RX ADMIN — ACETAMINOPHEN 1000 MG: 500 TABLET ORAL at 21:07

## 2021-01-01 RX ADMIN — EPOPROSTENOL 50 NG/KG/MIN: 1.5 INJECTION, POWDER, LYOPHILIZED, FOR SOLUTION INTRAVENOUS at 06:30

## 2021-01-01 RX ADMIN — SODIUM CHLORIDE, POTASSIUM CHLORIDE, SODIUM LACTATE AND CALCIUM CHLORIDE 1000 ML: 600; 310; 30; 20 INJECTION, SOLUTION INTRAVENOUS at 01:30

## 2021-01-01 RX ADMIN — VASOPRESSIN 0.04 UNITS/MIN: 20 INJECTION INTRAVENOUS at 21:29

## 2021-01-01 RX ADMIN — VASOPRESSIN 0.04 UNITS/MIN: 20 INJECTION INTRAVENOUS at 09:08

## 2021-01-01 RX ADMIN — METHOCARBAMOL TABLETS 750 MG: 500 TABLET, COATED ORAL at 14:28

## 2021-01-01 RX ADMIN — PHENYLEPHRINE HYDROCHLORIDE 100 MCG: 10 INJECTION INTRAVENOUS at 14:10

## 2021-01-01 RX ADMIN — CISATRACURIUM BESYLATE 2 MCG/KG/MIN: 10 INJECTION, SOLUTION INTRAVENOUS at 09:06

## 2021-01-01 RX ADMIN — FENTANYL CITRATE 50 MCG: 50 INJECTION, SOLUTION INTRAMUSCULAR; INTRAVENOUS at 15:29

## 2021-01-01 RX ADMIN — OXYCODONE HYDROCHLORIDE 5 MG: 5 TABLET ORAL at 20:33

## 2021-01-01 RX ADMIN — FAMOTIDINE 20 MG: 10 INJECTION INTRAVENOUS at 08:43

## 2021-01-01 RX ADMIN — PROPOFOL 70 MG: 10 INJECTION, EMULSION INTRAVENOUS at 12:04

## 2021-01-01 RX ADMIN — DEXTROSE MONOHYDRATE 2000 MG: 50 INJECTION, SOLUTION INTRAVENOUS at 22:37

## 2021-01-01 RX ADMIN — STANDARDIZED SENNA CONCENTRATE AND DOCUSATE SODIUM 1 TABLET: 8.6; 5 TABLET ORAL at 08:02

## 2021-01-01 RX ADMIN — ROCURONIUM BROMIDE 50 MG: 10 INJECTION INTRAVENOUS at 12:04

## 2021-01-01 RX ADMIN — DEXTROSE MONOHYDRATE 2000 MG: 50 INJECTION, SOLUTION INTRAVENOUS at 08:12

## 2021-01-01 RX ADMIN — Medication 200 MCG/HR: at 14:16

## 2021-01-01 RX ADMIN — POLYETHYLENE GLYCOL 3350 17 G: 17 POWDER, FOR SOLUTION ORAL at 08:30

## 2021-01-01 RX ADMIN — SODIUM BICARBONATE 100 MEQ: 84 INJECTION, SOLUTION INTRAVENOUS at 10:20

## 2021-01-01 RX ADMIN — Medication: at 18:33

## 2021-01-01 RX ADMIN — Medication 200 MCG/HR: at 13:16

## 2021-01-01 RX ADMIN — SODIUM CHLORIDE, PRESERVATIVE FREE 10 ML: 5 INJECTION INTRAVENOUS at 08:40

## 2021-01-01 RX ADMIN — ACETAMINOPHEN 1000 MG: 500 TABLET ORAL at 20:32

## 2021-01-01 RX ADMIN — Medication 50 MCG/HR: at 07:40

## 2021-01-01 RX ADMIN — OXYCODONE HYDROCHLORIDE 5 MG: 5 TABLET ORAL at 17:06

## 2021-01-01 RX ADMIN — Medication 54 MCG/MIN: at 08:56

## 2021-01-01 RX ADMIN — ALBUMIN (HUMAN) 25 G: 12.5 INJECTION, SOLUTION INTRAVENOUS at 14:03

## 2021-01-01 RX ADMIN — FUROSEMIDE 40 MG: 10 INJECTION, SOLUTION INTRAMUSCULAR; INTRAVENOUS at 08:30

## 2021-01-01 RX ADMIN — CEFAZOLIN SODIUM 2000 MG: 2 SOLUTION INTRAVENOUS at 12:15

## 2021-01-01 RX ADMIN — METHOCARBAMOL TABLETS 750 MG: 500 TABLET, COATED ORAL at 08:00

## 2021-01-01 RX ADMIN — PHENYLEPHRINE HYDROCHLORIDE 100 MCG: 10 INJECTION INTRAVENOUS at 14:23

## 2021-01-01 RX ADMIN — HYDROCORTISONE SODIUM SUCCINATE 50 MG: 100 INJECTION, POWDER, FOR SOLUTION INTRAMUSCULAR; INTRAVENOUS at 16:28

## 2021-01-01 RX ADMIN — CALCIUM CHLORIDE 0.5 G: 100 INJECTION, SOLUTION INTRAVENOUS; INTRAVENTRICULAR at 14:55

## 2021-01-01 RX ADMIN — Medication 50 MCG/HR: at 18:26

## 2021-01-01 RX ADMIN — Medication 95 MCG/MIN: at 14:31

## 2021-01-01 RX ADMIN — DEXTROSE MONOHYDRATE 12.5 G: 25 INJECTION, SOLUTION INTRAVENOUS at 01:31

## 2021-01-01 RX ADMIN — Medication 200 MCG/HR: at 10:00

## 2021-01-01 RX ADMIN — THIAMINE HYDROCHLORIDE 500 MG: 100 INJECTION, SOLUTION INTRAMUSCULAR; INTRAVENOUS at 10:44

## 2021-01-01 RX ADMIN — MAGNESIUM SULFATE 2000 MG: 2 INJECTION INTRAVENOUS at 05:08

## 2021-01-01 RX ADMIN — DEXTROSE MONOHYDRATE 12.5 G: 25 INJECTION, SOLUTION INTRAVENOUS at 11:34

## 2021-01-01 RX ADMIN — SODIUM CHLORIDE, PRESERVATIVE FREE 10 ML: 5 INJECTION INTRAVENOUS at 08:32

## 2021-01-01 RX ADMIN — PHENYLEPHRINE HYDROCHLORIDE 100 MCG: 10 INJECTION INTRAVENOUS at 13:18

## 2021-01-01 RX ADMIN — THIAMINE HYDROCHLORIDE 100 MG: 100 INJECTION, SOLUTION INTRAMUSCULAR; INTRAVENOUS at 18:22

## 2021-01-01 RX ADMIN — Medication 8 MG/HR: at 22:50

## 2021-01-01 RX ADMIN — CEPHALEXIN 500 MG: 500 CAPSULE ORAL at 08:00

## 2021-01-01 RX ADMIN — Medication 50 MCG/MIN: at 22:50

## 2021-01-01 RX ADMIN — METHOCARBAMOL TABLETS 750 MG: 500 TABLET, COATED ORAL at 13:11

## 2021-01-01 RX ADMIN — Medication 2 MG/HR: at 17:54

## 2021-01-01 RX ADMIN — CISATRACURIUM BESYLATE 2 MCG/KG/MIN: 10 INJECTION, SOLUTION INTRAVENOUS at 06:00

## 2021-01-01 RX ADMIN — METHOCARBAMOL TABLETS 750 MG: 500 TABLET, COATED ORAL at 09:30

## 2021-01-01 RX ADMIN — IPRATROPIUM BROMIDE AND ALBUTEROL SULFATE 1 AMPULE: .5; 3 SOLUTION RESPIRATORY (INHALATION) at 19:36

## 2021-01-01 RX ADMIN — IOPAMIDOL 75 ML: 755 INJECTION, SOLUTION INTRAVENOUS at 15:17

## 2021-01-01 RX ADMIN — GABAPENTIN 300 MG: 300 CAPSULE ORAL at 20:33

## 2021-01-01 RX ADMIN — SODIUM CHLORIDE, PRESERVATIVE FREE 10 ML: 5 INJECTION INTRAVENOUS at 08:19

## 2021-01-01 RX ADMIN — Medication: at 04:05

## 2021-01-01 RX ADMIN — PIPERACILLIN AND TAZOBACTAM 4500 MG: 4; .5 INJECTION, POWDER, LYOPHILIZED, FOR SOLUTION INTRAVENOUS; PARENTERAL at 12:51

## 2021-01-01 RX ADMIN — SODIUM CHLORIDE: 9 INJECTION, SOLUTION INTRAVENOUS at 00:50

## 2021-01-01 RX ADMIN — STANDARDIZED SENNA CONCENTRATE AND DOCUSATE SODIUM 1 TABLET: 8.6; 5 TABLET ORAL at 08:37

## 2021-01-01 RX ADMIN — Medication 100 MEQ: at 10:20

## 2021-01-01 RX ADMIN — DEXTROSE MONOHYDRATE: 500 INJECTION PARENTERAL at 04:45

## 2021-01-01 RX ADMIN — OXYCODONE HYDROCHLORIDE 5 MG: 5 TABLET ORAL at 14:28

## 2021-01-01 RX ADMIN — Medication 1 TABLET: at 08:37

## 2021-01-01 RX ADMIN — VASOPRESSIN 0.04 UNITS/MIN: 20 INJECTION INTRAVENOUS at 02:53

## 2021-01-01 RX ADMIN — HYDROCORTISONE SODIUM SUCCINATE 50 MG: 100 INJECTION, POWDER, FOR SOLUTION INTRAMUSCULAR; INTRAVENOUS at 08:30

## 2021-01-01 RX ADMIN — ENOXAPARIN SODIUM 30 MG: 30 INJECTION SUBCUTANEOUS at 20:45

## 2021-01-01 RX ADMIN — POLYETHYLENE GLYCOL 3350 17 G: 17 POWDER, FOR SOLUTION ORAL at 08:00

## 2021-01-01 RX ADMIN — OXYCODONE HYDROCHLORIDE 5 MG: 5 TABLET ORAL at 12:27

## 2021-01-01 RX ADMIN — METHOCARBAMOL TABLETS 750 MG: 500 TABLET, COATED ORAL at 20:33

## 2021-01-01 RX ADMIN — OXYCODONE HYDROCHLORIDE 5 MG: 5 TABLET ORAL at 05:51

## 2021-01-01 RX ADMIN — POTASSIUM BICARBONATE 40 MEQ: 782 TABLET, EFFERVESCENT ORAL at 08:43

## 2021-01-01 RX ADMIN — GABAPENTIN 300 MG: 300 CAPSULE ORAL at 09:30

## 2021-01-01 RX ADMIN — FENTANYL CITRATE 50 MCG: 50 INJECTION, SOLUTION INTRAMUSCULAR; INTRAVENOUS at 02:55

## 2021-01-01 RX ADMIN — Medication 50 MCG/MIN: at 04:29

## 2021-01-01 RX ADMIN — FAMOTIDINE 20 MG: 10 INJECTION INTRAVENOUS at 19:06

## 2021-01-01 RX ADMIN — Medication 50 MCG/MIN: at 10:12

## 2021-01-01 RX ADMIN — HYDROCORTISONE SODIUM SUCCINATE 50 MG: 100 INJECTION, POWDER, FOR SOLUTION INTRAMUSCULAR; INTRAVENOUS at 01:31

## 2021-01-01 RX ADMIN — Medication 1 TABLET: at 08:43

## 2021-01-01 RX ADMIN — ENOXAPARIN SODIUM 30 MG: 30 INJECTION SUBCUTANEOUS at 21:07

## 2021-01-01 RX ADMIN — Medication 1 TABLET: at 21:10

## 2021-01-01 RX ADMIN — Medication 1 MG/HR: at 10:22

## 2021-01-01 RX ADMIN — GABAPENTIN 300 MG: 300 CAPSULE ORAL at 08:01

## 2021-01-01 RX ADMIN — SODIUM CHLORIDE, PRESERVATIVE FREE 10 ML: 5 INJECTION INTRAVENOUS at 21:45

## 2021-01-01 RX ADMIN — PHENYLEPHRINE HYDROCHLORIDE 100 MCG: 10 INJECTION INTRAVENOUS at 13:50

## 2021-01-01 RX ADMIN — FENTANYL CITRATE 50 MCG: 50 INJECTION, SOLUTION INTRAMUSCULAR; INTRAVENOUS at 06:06

## 2021-01-01 RX ADMIN — Medication 62 MCG/MIN: at 14:14

## 2021-01-01 RX ADMIN — HYDROCORTISONE SODIUM SUCCINATE 100 MG: 100 INJECTION, POWDER, FOR SOLUTION INTRAMUSCULAR; INTRAVENOUS at 18:22

## 2021-01-01 RX ADMIN — Medication 75 MCG/MIN: at 02:53

## 2021-01-01 RX ADMIN — SODIUM CHLORIDE, PRESERVATIVE FREE 10 ML: 5 INJECTION INTRAVENOUS at 21:00

## 2021-01-01 RX ADMIN — HYDROCORTISONE SODIUM SUCCINATE 100 MG: 100 INJECTION, POWDER, FOR SOLUTION INTRAMUSCULAR; INTRAVENOUS at 06:30

## 2021-01-01 RX ADMIN — Medication 200 MCG/HR: at 15:01

## 2021-01-01 RX ADMIN — STANDARDIZED SENNA CONCENTRATE AND DOCUSATE SODIUM 1 TABLET: 8.6; 5 TABLET ORAL at 08:29

## 2021-01-01 RX ADMIN — Medication 50 MCG/MIN: at 03:34

## 2021-01-01 RX ADMIN — Medication 200 MCG/HR: at 05:04

## 2021-01-01 RX ADMIN — HYDROCORTISONE SODIUM SUCCINATE 50 MG: 100 INJECTION, POWDER, FOR SOLUTION INTRAMUSCULAR; INTRAVENOUS at 08:27

## 2021-01-01 RX ADMIN — POTASSIUM BICARBONATE 40 MEQ: 782 TABLET, EFFERVESCENT ORAL at 09:43

## 2021-01-01 RX ADMIN — Medication 200 MCG/HR: at 00:16

## 2021-01-01 RX ADMIN — Medication 61 MCG/MIN: at 21:43

## 2021-01-01 RX ADMIN — STANDARDIZED SENNA CONCENTRATE AND DOCUSATE SODIUM 1 TABLET: 8.6; 5 TABLET ORAL at 09:31

## 2021-01-01 RX ADMIN — VASOPRESSIN 0.04 UNITS/MIN: 20 INJECTION INTRAVENOUS at 17:24

## 2021-01-01 RX ADMIN — POTASSIUM BICARBONATE 40 MEQ: 782 TABLET, EFFERVESCENT ORAL at 13:25

## 2021-01-01 RX ADMIN — DEXTROSE MONOHYDRATE 12.5 G: 25 INJECTION, SOLUTION INTRAVENOUS at 20:55

## 2021-01-01 RX ADMIN — POTASSIUM CHLORIDE 40 MEQ: 1500 TABLET, EXTENDED RELEASE ORAL at 13:19

## 2021-01-01 RX ADMIN — DEXTROSE MONOHYDRATE 12.5 G: 25 INJECTION, SOLUTION INTRAVENOUS at 13:00

## 2021-01-01 RX ADMIN — ONDANSETRON 4 MG: 2 INJECTION INTRAMUSCULAR; INTRAVENOUS at 15:44

## 2021-01-01 RX ADMIN — PIPERACILLIN AND TAZOBACTAM 4500 MG: 4; .5 INJECTION, POWDER, LYOPHILIZED, FOR SOLUTION INTRAVENOUS; PARENTERAL at 12:59

## 2021-01-01 RX ADMIN — STANDARDIZED SENNA CONCENTRATE AND DOCUSATE SODIUM 1 TABLET: 8.6; 5 TABLET ORAL at 21:07

## 2021-01-01 RX ADMIN — Medication: at 15:54

## 2021-01-01 RX ADMIN — IOPAMIDOL 75 ML: 755 INJECTION, SOLUTION INTRAVENOUS at 02:47

## 2021-01-01 RX ADMIN — GABAPENTIN 300 MG: 300 CAPSULE ORAL at 08:27

## 2021-01-01 RX ADMIN — SODIUM CHLORIDE: 9 INJECTION, SOLUTION INTRAVENOUS at 13:06

## 2021-01-01 RX ADMIN — FAMOTIDINE 20 MG: 10 INJECTION INTRAVENOUS at 08:19

## 2021-01-01 RX ADMIN — FENTANYL CITRATE 25 MCG: 50 INJECTION, SOLUTION INTRAMUSCULAR; INTRAVENOUS at 15:37

## 2021-01-01 RX ADMIN — PIPERACILLIN AND TAZOBACTAM 4500 MG: 4; .5 INJECTION, POWDER, LYOPHILIZED, FOR SOLUTION INTRAVENOUS; PARENTERAL at 06:45

## 2021-01-01 RX ADMIN — SODIUM BICARBONATE: 84 INJECTION, SOLUTION INTRAVENOUS at 22:00

## 2021-01-01 RX ADMIN — VANCOMYCIN HYDROCHLORIDE 750 MG: 10 INJECTION, POWDER, LYOPHILIZED, FOR SOLUTION INTRAVENOUS at 10:08

## 2021-01-01 ASSESSMENT — PULMONARY FUNCTION TESTS
PIF_VALUE: 17
PIF_VALUE: 16
PIF_VALUE: 17
PIF_VALUE: 17
PIF_VALUE: 33
PIF_VALUE: 17
PIF_VALUE: 15
PIF_VALUE: 0
PIF_VALUE: 17
PIF_VALUE: 16
PIF_VALUE: 17
PIF_VALUE: 17
PIF_VALUE: 13
PIF_VALUE: 16
PIF_VALUE: 17
PIF_VALUE: 17
PIF_VALUE: 16
PIF_VALUE: 16
PIF_VALUE: 17
PIF_VALUE: 17
PIF_VALUE: 19
PIF_VALUE: 16
PIF_VALUE: 16
PIF_VALUE: 17
PIF_VALUE: 22
PIF_VALUE: 17
PIF_VALUE: 15
PIF_VALUE: 13
PIF_VALUE: 16
PIF_VALUE: 18
PIF_VALUE: 16
PIF_VALUE: 40
PIF_VALUE: 17
PIF_VALUE: 16
PIF_VALUE: 16
PIF_VALUE: 17
PIF_VALUE: 18
PIF_VALUE: 17
PIF_VALUE: 16
PIF_VALUE: 17
PIF_VALUE: 15
PIF_VALUE: 16
PIF_VALUE: 17
PIF_VALUE: 0
PIF_VALUE: 17
PIF_VALUE: 16
PIF_VALUE: 1
PIF_VALUE: 17
PIF_VALUE: 20
PIF_VALUE: 17
PIF_VALUE: 16
PIF_VALUE: 17
PIF_VALUE: 17
PIF_VALUE: 19
PIF_VALUE: 14
PIF_VALUE: 16
PIF_VALUE: 17
PIF_VALUE: 16
PIF_VALUE: 20
PIF_VALUE: 17
PIF_VALUE: 17
PIF_VALUE: 16
PIF_VALUE: 17
PIF_VALUE: 17
PIF_VALUE: 16
PIF_VALUE: 17
PIF_VALUE: 17
PIF_VALUE: 41
PIF_VALUE: 17
PIF_VALUE: 16
PIF_VALUE: 17
PIF_VALUE: 37
PIF_VALUE: 17
PIF_VALUE: 8
PIF_VALUE: 33
PIF_VALUE: 17
PIF_VALUE: 41
PIF_VALUE: 16
PIF_VALUE: 17
PIF_VALUE: 16
PIF_VALUE: 16
PIF_VALUE: 0
PIF_VALUE: 17
PIF_VALUE: 15
PIF_VALUE: 16
PIF_VALUE: 16
PIF_VALUE: 17
PIF_VALUE: 16
PIF_VALUE: 17
PIF_VALUE: 37
PIF_VALUE: 17
PIF_VALUE: 16
PIF_VALUE: 17
PIF_VALUE: 17
PIF_VALUE: 13
PIF_VALUE: 32
PIF_VALUE: 17
PIF_VALUE: 19
PIF_VALUE: 16
PIF_VALUE: 8
PIF_VALUE: 17
PIF_VALUE: 16
PIF_VALUE: 37
PIF_VALUE: 17
PIF_VALUE: 41
PIF_VALUE: 17
PIF_VALUE: 41
PIF_VALUE: 17
PIF_VALUE: 16
PIF_VALUE: 16
PIF_VALUE: 17
PIF_VALUE: 20
PIF_VALUE: 17
PIF_VALUE: 17
PIF_VALUE: 16
PIF_VALUE: 16
PIF_VALUE: 17
PIF_VALUE: 16
PIF_VALUE: 16
PIF_VALUE: 17
PIF_VALUE: 31
PIF_VALUE: 17

## 2021-01-01 ASSESSMENT — PAIN DESCRIPTION - LOCATION
LOCATION: HIP
LOCATION: LEG
LOCATION: LEG

## 2021-01-01 ASSESSMENT — ENCOUNTER SYMPTOMS
VOMITING: 0
PHOTOPHOBIA: 0
COUGH: 0
WHEEZING: 0
VOMITING: 0
VOMITING: 0
BACK PAIN: 1
COLOR CHANGE: 0
BLOOD IN STOOL: 0
ABDOMINAL PAIN: 0
SHORTNESS OF BREATH: 0
ABDOMINAL PAIN: 0
SHORTNESS OF BREATH: 0
CHEST TIGHTNESS: 0
ABDOMINAL PAIN: 0
SHORTNESS OF BREATH: 0
NAUSEA: 0
BACK PAIN: 1
ABDOMINAL DISTENTION: 0
COUGH: 0
BACK PAIN: 0
NAUSEA: 0
DIARRHEA: 0
NAUSEA: 0
COUGH: 0
COUGH: 0
SHORTNESS OF BREATH: 0
NAUSEA: 0
WHEEZING: 0
VOMITING: 0
CONSTIPATION: 0
BACK PAIN: 1

## 2021-01-01 ASSESSMENT — PAIN DESCRIPTION - ORIENTATION
ORIENTATION: RIGHT

## 2021-01-01 ASSESSMENT — PAIN DESCRIPTION - DESCRIPTORS
DESCRIPTORS: DISCOMFORT
DESCRIPTORS: DISCOMFORT

## 2021-01-01 ASSESSMENT — PAIN SCALES - GENERAL
PAINLEVEL_OUTOF10: 5
PAINLEVEL_OUTOF10: 6
PAINLEVEL_OUTOF10: 7
PAINLEVEL_OUTOF10: 7
PAINLEVEL_OUTOF10: 5
PAINLEVEL_OUTOF10: 9
PAINLEVEL_OUTOF10: 8
PAINLEVEL_OUTOF10: 8
PAINLEVEL_OUTOF10: 7
PAINLEVEL_OUTOF10: 7
PAINLEVEL_OUTOF10: 8
PAINLEVEL_OUTOF10: 10
PAINLEVEL_OUTOF10: 7

## 2021-01-01 ASSESSMENT — PAIN DESCRIPTION - PROGRESSION
CLINICAL_PROGRESSION: NOT CHANGED
CLINICAL_PROGRESSION: GRADUALLY IMPROVING
CLINICAL_PROGRESSION: NOT CHANGED
CLINICAL_PROGRESSION: NOT CHANGED

## 2021-01-01 ASSESSMENT — PAIN - FUNCTIONAL ASSESSMENT
PAIN_FUNCTIONAL_ASSESSMENT: ACTIVITIES ARE NOT PREVENTED
PAIN_FUNCTIONAL_ASSESSMENT: PREVENTS OR INTERFERES SOME ACTIVE ACTIVITIES AND ADLS
PAIN_FUNCTIONAL_ASSESSMENT: ACTIVITIES ARE NOT PREVENTED

## 2021-01-01 ASSESSMENT — PAIN DESCRIPTION - ONSET: ONSET: ON-GOING

## 2021-01-01 ASSESSMENT — PAIN DESCRIPTION - FREQUENCY
FREQUENCY: CONTINUOUS

## 2021-01-01 ASSESSMENT — PAIN DESCRIPTION - PAIN TYPE
TYPE: ACUTE PAIN
TYPE: ACUTE PAIN;SURGICAL PAIN
TYPE: ACUTE PAIN
TYPE: ACUTE PAIN
TYPE: SURGICAL PAIN

## 2021-04-21 PROBLEM — F10.929 ALCOHOL INTOXICATION (HCC): Status: ACTIVE | Noted: 2021-01-01

## 2021-04-21 PROBLEM — S22.089A FRACTURE OF ELEVENTH THORACIC VERTEBRA (HCC): Status: ACTIVE | Noted: 2021-01-01

## 2021-04-21 PROBLEM — S32.019A FRACTURE OF FIRST LUMBAR VERTEBRA (HCC): Status: ACTIVE | Noted: 2021-01-01

## 2021-04-21 PROBLEM — T14.90XA TRAUMA: Status: ACTIVE | Noted: 2021-01-01
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
## 2021-04-21 PROBLEM — D64.9 ANEMIA: Status: ACTIVE | Noted: 2021-01-01

## 2021-04-21 PROBLEM — S72.91XA FRACTURE OF RIGHT FEMUR (HCC): Status: ACTIVE | Noted: 2021-01-01

## 2021-04-21 PROBLEM — S22.42XA FRACTURE OF MULTIPLE RIBS OF LEFT SIDE: Status: ACTIVE | Noted: 2021-01-01

## 2021-04-21 NOTE — PROGRESS NOTES
Occupational Therapy    Occupational Therapy Not Seen Note    DATE: 2021  Name: Sergey Garcia  : 1955  MRN: [id number]    Patient not available for Occupational Therapy due to:    Patient is not appropriate for OOB activity at this time d/t RLE currently in traction by ortho. Hold OT eval.    Next Scheduled Treatment: Attempt on  as appropriate.     Electronically signed by Vianca Cobb OT on 2021 at 10:01 AM

## 2021-04-21 NOTE — CONSULTS
Dougi  22.    Department of Neurosurgery        Reason for Consult: T11, L1 compression fracture  Requesting Physician: Dr Aroldo Scott:   [x]Dr. Chad Law  []Dr. Jessy Mary  []Dr. Bessie Dumont  []Dr. Fernanda Cardoza  []Dr. Leena Clark  []Dr. Karen Palencia    History Obtained From:  patient    CHIEF COMPLAINT:         S/p fall    HISTORY OF PRESENT ILLNESS:       The patient is a 73 y.o. female who presents after sustaining a fall. Patient states that she was walking outside to go do laundry when she suddenly must have tripped from a crack and subsequently fell on concrete. She did not lose consciousness. She was engaged in EtOH usage. She had brainstem functions but was found to have a right distal femoral fracture. She is currently in traction in her RLE. Orthopedics is on board with tentative plan for possible intervention. At baseline, she is functional and able to perform her daily activities without any significant impairment, she denies any back pain or discomfort and has no history of falls. Since sustaining the injury her right lower extremity was shortened and rotated. Pulses were intact. Neurosurgery was consulted for thoracolumbar appearing compression fractures. Patient's hemoglobin was 6.7 and was given 1 unit of PRBC. PAST MEDICAL HISTORY :       Past Medical History:    History reviewed. No pertinent past medical history.     Past Surgical History:        Procedure Laterality Date    JOINT REPLACEMENT         Social History:   Social History     Socioeconomic History    Marital status: Single     Spouse name: Not on file    Number of children: Not on file    Years of education: Not on file    Highest education level: Not on file   Occupational History    Not on file   Social Needs    Financial resource strain: Not on file    Food insecurity     Worry: Not on file     Inability: Not on file    Transportation needs     Medical: Not on file     Non-medical: Not on file Tobacco Use    Smoking status: Current Some Day Smoker   Substance and Sexual Activity    Alcohol use: Yes     Comment: 2 tall cans daily    Drug use: Never    Sexual activity: Not on file   Lifestyle    Physical activity     Days per week: Not on file     Minutes per session: Not on file    Stress: Not on file   Relationships    Social connections     Talks on phone: Not on file     Gets together: Not on file     Attends Latter day service: Not on file     Active member of club or organization: Not on file     Attends meetings of clubs or organizations: Not on file     Relationship status: Not on file    Intimate partner violence     Fear of current or ex partner: Not on file     Emotionally abused: Not on file     Physically abused: Not on file     Forced sexual activity: Not on file   Other Topics Concern    Not on file   Social History Narrative    Not on file       Family History:   History reviewed. No pertinent family history.     Allergies:   Sulfa antibiotics    Home Medications:  Prior to Admission medications    Not on File       Current Medications:   Current Facility-Administered Medications: 0.9 % sodium chloride infusion, , Intravenous, PRN  0.9 % sodium chloride infusion, , Intravenous, PRN  acetaminophen (TYLENOL) tablet 1,000 mg, 1,000 mg, Oral, 3 times per day  0.9 % sodium chloride infusion, , Intravenous, Continuous  oxyCODONE (ROXICODONE) immediate release tablet 5 mg, 5 mg, Oral, Q4H PRN **OR** oxyCODONE (ROXICODONE) immediate release tablet 10 mg, 10 mg, Oral, Q4H PRN  fentaNYL (SUBLIMAZE) injection 50 mcg, 50 mcg, Intravenous, Q2H PRN  methocarbamol (ROBAXIN) tablet 750 mg, 750 mg, Oral, TID  gabapentin (NEURONTIN) capsule 300 mg, 300 mg, Oral, TID  polyethylene glycol (GLYCOLAX) packet 17 g, 17 g, Oral, Daily  bisacodyl (DULCOLAX) EC tablet 5 mg, 5 mg, Oral, Daily PRN  sennosides-docusate sodium (SENOKOT-S) 8.6-50 MG tablet 1 tablet, 1 tablet, Oral, BID  ondansetron (ZOFRAN) injection 4 mg, 4 mg, Intravenous, Q6H PRN  ceFAZolin (ANCEF) 2000 mg in dextrose 5 % 50 mL IVPB, 2,000 mg, Intravenous, On Call to [address] [place] [address]:       General ROS - No fevers, no chills  Ophthalmic ROS - No changes in vision from baseline  ENT ROS -  No sore throat, no rhinorrhea  Respiratory ROS - no cough, no shortness of breath  Cardiovascular ROS - No chest pain  Gastrointestinal ROS - No abdominal pain, no nausea, no vomiting  Genito-Urinary ROS - No dysuria  Musculoskeletal ROS - No neck pain, no back pain,+ RLE pain and discomfort  Neurological ROS - No focal weakness or loss of sensation, no headache  Dermatological ROS - No lesions, no rash  Vascular/Lymphatic ROS - No edema    PHYSICAL EXAM:       Vitals:    04/21/21 0415   BP: 121/73   Pulse: 86   Resp: 16   Temp: 97.7 °F (36.5 °C)   SpO2: 100%       CONSTITUTIONAL:   Awake, alert and oriented and able to follow commands. Speech. Clear, no dysarthria or aphasia   HEAD:  normocephalic, atraumatic    EYES:  PERRLA, EOMI   ENT:  moist mucous membranes, no stridor, no tracheal deviation   NECK:  no midline tenderness to palpation, no step-offs or deformities   BACK:   No step-off noted denies back pain but unable to assess as patient is in traction   LUNGS:  CTAB, equal air entry bilaterally, no wheezes or rales   CARDIOVASCULAR:  RRR, no murmur   ABDOMEN:  Soft, no rigidity   NEUROLOGIC:  EYE OPENING     Spontaneous - 4 [x]       To voice - 3 []       To pain - 2 []       None - 1 []    VERBAL RESPONSE     Appropriate, oriented - 5 [x]       Dazed or confused - 4 []       Syllables, expletives - 3 []       Grunts - 2 []       None - 1 []    MOTOR RESPONSE     Spontaneous, command - 6 [x]       Localizes pain - 5 []       Withdraws pain - 4 []       Abnormal flexion - 3 []       Abnormal extension - 2 []       None - 1 []            Total GCS: 15    Awake, alert and able to follow commands, oriented x3.   No gaze deviation, no facial droop, tongue midline elevates uvula  Spontaneously moving bilateral upper extremities. Right lower extremity in traction, spontaneously moving left upper extremity and lower extremity with extension and flexion at the elbow, wrist, knee and ankle. SKIN:  no rash      LABS AND IMAGING:     Labs:  CBC with Differential:    Lab Results   Component Value Date    WBC 9.1 04/21/2021    RBC 2.95 04/21/2021    HGB 6.7 04/21/2021    HCT 21.6 04/21/2021     04/21/2021    MCV 73.2 04/21/2021    MCH 22.7 04/21/2021    MCHC 31.0 04/21/2021    RDW 25.1 04/21/2021     BMP:    Lab Results   Component Value Date     04/21/2021    K 3.5 04/21/2021    CL 90 04/21/2021    CO2 20 04/21/2021    BUN 3 04/21/2021    CREATININE 0.28 04/21/2021    GFRAA >60 04/21/2021    LABGLOM >60 04/21/2021    GLUCOSE 91 04/21/2021       Radiology Review:    1. Multilevel thoracolumbar vertebral body remote appearing compression   fractures, as discussed above. 2. For clinical suspicion of superimposed acute vertebral compression,   recommend MRI thoracic and lumbar spine examinations for further evaluation. 3. L4 through S1 posterior lumbar interbody fusion with L4 vertebral body   screws appearing to expand beyond the superior endplate cortex. 4. 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  Cannot exclude association with metastatic   disease.  Recommend clinical correlation. ASSESSMENT AND PLAN:       Patient Active Problem List   Diagnosis    Trauma       A/P:    Old thoracolumbar compression fracture. No convincing evidence of acuity per imaging & no localized pain  MRI L/S to evaluate lytic process.  No cortical break so unlikely     Judith Boo DO  Neurosurgery  O: Boris  C: [phone]

## 2021-04-21 NOTE — BRIEF OP NOTE
Brief Postoperative Note      Patient: Dayami Hudson  YOB: 1955  MRN: [id number]   CSN: [id number]     Date of Procedure: 4/21/2021    Pre-Op Diagnosis: Right intraarticular supracondylar femur fracture    Post-Op Diagnosis: Right intraarticular supracondylar femur fracture       Procedure(s):  Open tx of Right intraarticular supracondylar femur fracture; CPT 38193    Surgeon(s):  Brian Talley DO    Assistant:  Resident: Marisela Hebert MD; Jina Flores DO    Anesthesia: General    Estimated Blood Loss (mL): 500 cc    IVF: 1500 cc crystalloid, 1 U PRBC, 250 cc albumin    Complications: None    Specimens:   * No specimens in log *    Implants:  [place] 13 x 380 mm T2 SCN IMN with condylar screws and bolts  Stryler 6.5 mm cancellous screw x 2  Implant Name Type Inv.  Item Serial No.  Lot No. LRB No. Used Action   NAIL IM [id number] [id number] 125DEG LNG R SUPCNDYL TI JOCE VIVIAN RG  NAIL IM L380MM [id number] 125DEG LNG R SUPCNDYL TI JOCE VIVIAN RG  [email or web address] [id number] Right 1 Implanted   NUT ORTH L17MM DIA5MM TI FOR CNDYL SCR T2  NUT ORTH L17MM DIA5MM TI FOR CNDYL SCR T2  [email or web address] [id number] Right 2 Implanted   SCREW BNE L65MM DIA5MM RADHA CNDYL FEM TI NONCANNULATED VIVIAN  SCREW BNE L65MM DIA5MM RADHA CNDYL FEM TI NONCANNULATED VIVIAN  [email or web address] [id number] Right 1 Implanted   SCREW BNE L80MM DIA5MM RADHA CNDYL FEM TI NONCANNULATED VIVIAN  SCREW BNE L80MM DIA5MM RADHA CNDYL FEM TI NONCANNULATED VIVIAN  [email or web address] [id number] Right 1 Implanted   SCREW BNE L70MM DIA5MM RADHA TI VIVIAN FULL THRD SHFT FOR T2 IM  SCREW BNE L70MM DIA5MM RADHA TI VIVIAN FULL THRD SHFT FOR T2 IM  [employer] [id number] Right 1 Implanted   SCREW BNE L85MM DIA5MM RADHA TI VIVIAN FULL THRD SHFT FOR T2 IM  SCREW BNE L85MM DIA5MM RADHA TI VIVIAN FULL THRD SHFT FOR T2 IM  [employer] HOW- [id number] Right 1 Implanted   SCREW BNE L40MM DIA5MM RADHA TI VIVIAN FULL THRD SHFT FOR T2 IM

## 2021-04-21 NOTE — ED PROVIDER NOTES
Faculty Sign-Out Attestation  Handoff taken on the following patient from prior Attending Physician: Odessa Bernheim    I was available and discussed any additional care issues that arose and coordinated the management plans with the resident(s) caring for the patient during my duty period. Any areas of disagreement with residents documentation of care or procedures are noted on the chart. I was personally present for the key portions of any/all procedures during my duty period. I have documented in the chart those procedures where I was not present during the key portions.     Leg injury, femur fx, hgb 6, trauma aware of hgb \\ admitting    Shahram Rubin DO  Attending Physician     Shahram Rubin DO  04/21/21 0206

## 2021-04-21 NOTE — ED NOTES
Bed: 29  Expected date:   Expected time:   Means of arrival:   Comments:  LSJose Adrian RN  04/21/21 0015

## 2021-04-21 NOTE — ANESTHESIA PRE PROCEDURE
Department of Anesthesiology  Preprocedure Note       Name:  Fausto Costello   Age:  73 y.o.  :  1955                                          MRN:  [id number]         Date:  2021      Surgeon: Ana Laura Riddle):  Roseline Sanchez DO    Procedure: Procedure(s):  ADD-ON, WANTS 0800, I.M. NAIL VS. ORIF FEMUR FX., NINO SUPRACONDYLAR NAIL, SYNTHES DISTAL FEMUR PPLATES, STERILE SKELETAL TX., TRIANGLES, TOOL BOX, RESIDENT TO NOTIFY REPS., JOSEPH GELLER(SUPINE)    Medications prior to admission:   Prior to Admission medications    Not on File       Current medications:    Current Facility-Administered Medications   Medication Dose Route Frequency Provider Last Rate Last Admin    0.9 % sodium chloride infusion   Intravenous PRN Dayday Wrightwood Gruenbaum, DO        0.9 % sodium chloride infusion   Intravenous PRN Madlyn Alis, DO        acetaminophen (TYLENOL) tablet 1,000 mg  1,000 mg Oral 3 times per day Madlyn Alis, DO   1,000 mg at 21 0932    0.9 % sodium chloride infusion   Intravenous Continuous Madlyn Alis, DO 90 mL/hr at 21 0606 New Bag at 21 0606    oxyCODONE (ROXICODONE) immediate release tablet 5 mg  5 mg Oral Q4H PRN Madlyn Alis, DO        Or    oxyCODONE (ROXICODONE) immediate release tablet 10 mg  10 mg Oral Q4H PRN Madlyn Alis, DO        fentaNYL (SUBLIMAZE) injection 50 mcg  50 mcg Intravenous Q2H PRN Madlyn Alis, DO   50 mcg at 21 1037    methocarbamol (ROBAXIN) tablet 750 mg  750 mg Oral TID Madlyn Alis, DO   750 mg at 21 0930    gabapentin (NEURONTIN) capsule 300 mg  300 mg Oral TID Madlyn Alis, DO   300 mg at 21 0930    polyethylene glycol (GLYCOLAX) packet 17 g  17 g Oral Daily Madlyn Alis, DO   Stopped at 21 0926    bisacodyl (DULCOLAX) EC tablet 5 mg  5 mg Oral Daily PRN Madlyn Alis, DO        sennosides-docusate sodium (SENOKOT-S) 8.6-50 MG tablet 1 tablet  1 tablet Oral BID Madlyn Alis, DO   1 RDW 23.7 04/21/2021     04/21/2021       CMP:   Lab Results   Component Value Date     04/21/2021    K 3.6 04/21/2021    CL 95 04/21/2021    CO2 20 04/21/2021    BUN 2 04/21/2021    CREATININE 0.23 04/21/2021    GFRAA >60 04/21/2021    LABGLOM >60 04/21/2021    GLUCOSE 70 04/21/2021    CALCIUM 8.0 04/21/2021       POC Tests: No results for input(s): POCGLU, POCNA, POCK, POCCL, POCBUN, POCHEMO, POCHCT in the last 72 hours. Coags:   Lab Results   Component Value Date    PROTIME 12.3 04/21/2021    INR 1.2 04/21/2021    APTT 25.2 04/21/2021       HCG (If Applicable): No results found for: PREGTESTUR, PREGSERUM, HCG, HCGQUANT     ABGs: No results found for: PHART, PO2ART, YQW4CAZ, ZQC4IXB, BEART, E5IKXKYM     Type & Screen (If Applicable):  No results found for: LABABO, LABRH    Drug/Infectious Status (If Applicable):  No results found for: HIV, HEPCAB    COVID-19 Screening (If Applicable):   Lab Results   Component Value Date    COVID19 Not Detected 04/21/2021           Anesthesia Evaluation  Patient summary reviewed no history of anesthetic complications:   Airway: Mallampati: II        Dental:          Pulmonary:normal exam  breath sounds clear to auscultation                            ROS comment: Occasional smoker   Cardiovascular:  Exercise tolerance: good (>4 METS),           Rhythm: regular  Rate: normal                    Neuro/Psych:                ROS comment: Trauma  Fracture of right femur (HCC)  Blood alcohol level of 120-199 mg/100 ml  Alcohol intoxication ([address]  Anemia  Fracture of multiple ribs of left side  Fracture of eleventh thoracic vertebra ([address]  Fracture of first lumbar vertebra ([address]     GI/Hepatic/Renal: Neg GI/Hepatic/Renal ROS            Endo/Other: Negative Endo/Other ROS                    Abdominal:           Vascular: negative vascular ROS. Anesthesia Plan      general     ASA 3       Induction: intravenous.       Anesthetic plan and risks discussed with patient. Plan discussed with CRNA.                   Page Whiting MD   4/21/2021

## 2021-04-21 NOTE — H&P
TRAUMA HISTORY AND PHYSICAL EXAMINATION    PATIENT NAME: Khoi Xavier  YOB: 1955  MEDICAL RECORD NO. [id number]   DATE: 4/21/2021  PRIMARY CARE PHYSICIAN: No primary care provider on file. PATIENT EVALUATED AT THE REQUEST OF : Estefania Dumont    ACTIVATION   []Trauma Alert     [] Trauma Priority     [x]Trauma Consult. IMPRESSION:     [facility]    MEDICAL DECISION MAKING AND PLAN:     Fall SH to knees  ETOH  R femur fx  T11 and L1 acute compression fx   - Many chronic T/L fx  L rib fx 8 through 12 - 10, 11 and 12 fx in 2 locations  Anemic Hb 6.7    Orthopedic surgery consulted   - F/u recs  Neurosurgery consulted   - F/u recs  Hgb 6.7, will give 1u pRBC in ED  Admit to trauma service   NPO pending ortho  Pain MMT  PT/OT  DVT prophylaxis held for possible OR planning      CONSULT SERVICES    [x] Neurosurgery     [x] Orthopedic Surgery    [] Cardiothoracic     [] Facial Trauma    [] Plastic Surgery (Burn)    [] Pediatric Surgery     [] Internal Medicine    [] Pulmonary Medicine    [] Other:      HISTORY:     Chief Complaint:  \"Fall SH\"    INJURY SUMMARY  R femur fx    GENERAL DATA  Age 73 y.o.  female   Patient information was obtained from patient. History/Exam limitations: none.   Patient presented to the Emergency Department by ambulance   Injury Date: 4/21/2021   Approximate Injury Time: PTA        Transport mode:   [x]Ambulance      [] Helicopter     []Car       [] Other  Referring Hospital: [place] [id number], (e.g., home, farm, industry, street)  Specific Details of Location (e.g., bedroom, kitchen, garage): home  Type of Residence (if occurred in home setting) (e.g., apartment, mobile home, single family home): home    MECHANISM OF INJURY    [] Motor Vehicle Collision   Specific vehicle type involved (e.g., sedan, minivan, SUV, pickup truck):   Collision with (e.g., type of vehicle, building, barn, ditch, tree):     Type of collision  [] Single Vehicle Collision  []Multiple Vehicle Collision [] unknown collision type    Mechanism considerations  [] Fatality in Same Vehicle      []Ejected       []Rollover          []Extricated    Internal Compartment   []                      []Passenger:      []Front Seat        []Rear [address]  [] Unrestrained   []Lap Belt Only Restrained   [] Shoulder Belt Only Restrained  [] 3 Point Restrained  [] unknown     Air Bags  [] Front Air Bag  []Side Air Bag  []Curtain Airbag [][email or web address] Not Deployed        Pediatric Consideration:      [] Booster Seat  []Infant Car Seat  [] Child Car Seat      [] Motorcycle Collision   Wearing Helmet     []Yes     []No    []Unknown    [] Bicycle Collision Wearing Helmet     []Yes     []No    []Unknown    [] Pedestrian Struck         [x] Fall    [x]From Standing     []From Height     Ft     []Down Stairs ___steps    [] Assault    [] Gunshot  Specify caliber / type of gun: ____________________________    [] Stabbing  Specify weapon type, size: _____________________________    [] Burn  []Flame   []Scald   []Electrical   []Chemical  []Inhalation   []House fire    [] Other ______________________________________________________    [] Other protective devices used / worn ___________________________    HISTORY:     Bailey Bundy is a 73 y.o. female that presented to the Emergency Department following a fall [facility] then BUE after mechanically tripping over crack in cement floors. No LOC or head injury, no other injuries reported. Patient could not bear weight on RLE hence called ambulance to ED. No chemical AP/AC. History of osteoporosis. ETOH on board today.      Loss of Consciousness [x]No   []Yes Duration(min)      [] Unknown     Total Fluids Given Prior To Arrival  mL    MEDICATIONS:   []  None     []  Information not available due to exam limitations documented above  Prior to Admission medications    Not on File       ALLERGIES:   []  None    []   Information not available due to exam limitations documented above   Sulfa antibiotics    PAST MEDICAL HISTORY: []  None   []   Information not available due to exam limitations documented above    has no past medical history on file. has a past surgical history that includes joint replacement. FAMILY HISTORY   []   Information not available due to exam limitations documented above    family history is not on file. SOCIAL HISTORY  []   Information not available due to exam limitations documented above     reports that she has been smoking. She does not have any smokeless tobacco history on file. reports current alcohol use. reports no history of drug use. PERTINENT SYSTEMIC REVIEW:    []   Information not available due to exam limitations documented above    Pertinent items are noted in HPI.     PHYSICAL EXAMINATION:     GLASCOW COMA SCALE  NEUROMUSCULAR BLOCKADE PRIOR TO ARRIVAL     [x]No        []Yes      Variable  Score   Variable  Score  Eye opening [x]Spontaneous 4 Verbal  [x]Oriented  5     []To voice  3   []Confused  4    []To pain  2   []Inapp words  3    []None  1   []Incomp words 2       []None  1   Motor   [x]Obeys  6    []Localizes pain 5    []Withdraws(pain) 4    []Flexion(pain) 3  []Extension(pain) 2    []None  1     GCS Total = 15    PHYSICAL EXAMINATION    VITAL SIGNS:   Vitals:    04/21/21 0018   BP: 120/87   Pulse: 102   Resp: 18   Temp: 98.6 °F (37 °C)   SpO2: 99%       General Appearance: alert and oriented to person, place and time, well developed and well- nourished, in no acute distress  Skin: warm and dry, no rash or erythema  Head: normocephalic and atraumatic   Eyes: pupils equal, round, and reactive to light, extraocular eye movements intact, conjunctivae normal  Neck: supple and non-tender without mass  Pulmonary/Chest: clear to auscultation bilaterally- no wheezes, rales or rhonchi, normal air movement, no respiratory distress  Cardiovascular: normal rate, regular rhythm, normal S1 and S2, no murmurs, rubs, clicks, or gallops, distal pulses intact, no carotid bruits  Abdomen: soft, non-tender, non-distended, normal bowel sounds, scar from hysterectomy  Musculoskeletal: R hip pain, swelling and limited ROM due to pain, gross deformity to R femur, R foot externally rotated, ecchymosis to R knee and thigh with superficial abrasion of R knee  Neurologic: grossly intact    FOCUSED ABDOMINAL SONOGRAM FOR TRAUMA (FAST): A fair  quality examination was performed by Dr. Randall Goldstein and representative images were obtained. [x] No free fluid in the abdomen   [] Free fluid in RUQ   [] Free fluid in LUQ  [] Free fluid in Pelvis  [] Pericardial fluid  [] Other:        RADIOLOGY  XR TIBIA FIBULA RIGHT (2 VIEWS)   Final Result   Distal femur fracture. Please refer to previous reports. Osteoporosis. Old healed distal tibial fracture. 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 Other possibilities include enchondroma or less likely low-grade   chondrosarcoma. Correlation with old studies would be most useful. An   addendum can be made should they become available. XR FEMUR RIGHT (MIN 2 VIEWS)   Final Result   Distal femur fracture as described with associated moderate knee joint   effusion/hemarthrosis. Osteoporosis. XR KNEE RIGHT (3 VIEWS)   Final Result   Comminuted distal right femur fracture as described with intra-articular   extension. Osteoporosis. Moderate knee joint effusion/hemarthrosis. XR HIP 2-3 VW W PELVIS RIGHT   Final Result   No acute abnormality evident. Posttraumatic changes on the left. Osteoporosis. Postop changes of the lumbar fusion and total left hip arthroplasty.          XR CHEST (SINGLE VIEW FRONTAL)    (Results Pending)     LABS    Labs Reviewed   TRAUMA PANEL - Abnormal; Notable for the following components:       Result Value    Ethanol 187 (*)     Ethanol percent 0.187 (*)     BUN 3 (*)     RBC 2.95 (*) Hemoglobin 6.7 (*)     Hematocrit 21.6 (*)     MCV 73.2 (*)     MCH 22.7 (*)     RDW 25.1 (*)     CREATININE 0.28 (*)     Sodium 121 (*)     Potassium 3.5 (*)     Chloride 90 (*)     pCO2, [place] 37.2 (*)     HCO3, Venous 20.2 (*)     Negative Base Excess, [place] 4.4 (*)     All other components within normal limits   COVID-19, RAPID   TYPE AND SCREEN   PREPARE RBC (CROSSMATCH)       AMI Moses MD  4/21/21, 1:51 AM      Attending Note      I have reviewed the above GCS note(s) and I either performed the key elements of the medical history and physical exam or was present with the trauma resident when the key elements of the medical history and physical exam were performed. I have discussed the findings, established the care plan and recommendations with the trauma team.  Seen and examined. Etoh . 187. States occasionally drinks etoh. Rt femur fx, anemic and hyponatremia. Will admit, consult ortho and likely medicine for chronic issues. Monitor for withdrawal.  Acute on chronic rib fxs, possible compression fx.   Laney Pathak MD  4/21/2021  7:29 AM

## 2021-04-21 NOTE — ED NOTES
Pt given purewick and assisted w/ application, educated on use.        Gabriela Quigley, RN  04/21/21 5565

## 2021-04-21 NOTE — PROGRESS NOTES
Speech Language Pathology  [address]  Speech Language Pathology    SPEECH/COGNITIVE ASSESSMENT    NO LOC,[facility] OR CVA/TIA - ST TO DEFER AT THIS TIME      Date: 4/21/2021  Patient Name: Khoi Xavier  YOB: 1955   AGE: 73 y.o. MRN: [id number]        PT NOT SEEN FOR SPEECH OR COGNITIVE ASSESSMENT AT THIS TIME AS NO LOC, [facility] OR CVA/TIA IS DOCUMENTED. ST TO DEFER AT THIS TIME. PLEASE RE-COSULT AS NEEDED.       Geraldine Petty  4/21/2021  7:02 AM

## 2021-04-21 NOTE — ED PROVIDER NOTES
Gets together: Not on file     Attends Yazidi service: Not on file     Active member of club or organization: Not on file     Attends meetings of clubs or organizations: Not on file     Relationship status: Not on file    Intimate partner violence     Fear of current or ex partner: Not on file     Emotionally abused: Not on file     Physically abused: Not on file     Forced sexual activity: Not on file   Other Topics Concern    Not on file   Social History Narrative    Not on file       History reviewed. No pertinent family history. Allergies:  Sulfa antibiotics    Home Medications:  Prior to Admission medications    Not on File       REVIEW OF SYSTEMS    (2-9 systems for level 4, 10 or more for level 5)      Review of Systems   Constitutional: Negative for chills, diaphoresis, fatigue and fever. Respiratory: Negative for cough, chest tightness, shortness of breath and wheezing. Cardiovascular: Negative for chest pain, palpitations and leg swelling. Gastrointestinal: Negative for abdominal pain, constipation, diarrhea, nausea and vomiting. Endocrine: Negative for polydipsia, polyphagia and polyuria. Genitourinary: Negative for difficulty urinating, dysuria and urgency. Musculoskeletal: Positive for arthralgias (Right upper leg). Negative for back pain, neck pain and neck stiffness. Skin: Negative for color change, pallor and rash. Neurological: Negative for dizziness, weakness, light-headedness and headaches. Psychiatric/Behavioral: Negative for agitation and behavioral problems. PHYSICAL EXAM   (up to 7 for level 4, 8 or more for level 5)      INITIAL VITALS:   /73   Pulse 86   Temp 97.7 °F (36.5 °C) (Oral)   Resp 16   Wt 105 lb (47.6 kg)   SpO2 100%   BMI 18.02 kg/m²     Physical Exam  Vitals signs and nursing note reviewed. Constitutional:       General: She is not in acute distress. Appearance: She is well-developed. She is not diaphoretic.    HENT:      Head: Normocephalic and atraumatic. Eyes:      General: No scleral icterus. Conjunctiva/sclera: Conjunctivae normal.      Pupils: Pupils are equal, round, and reactive to light. Neck:      Musculoskeletal: Normal range of motion and neck supple. Vascular: No JVD. Trachea: No tracheal deviation. Cardiovascular:      Rate and Rhythm: Normal rate and regular rhythm. Heart sounds: Normal heart sounds. No murmur. No friction rub. Pulmonary:      Effort: Pulmonary effort is normal. No respiratory distress. Breath sounds: Normal breath sounds. No wheezing. Chest:      Chest wall: No tenderness. Abdominal:      General: Bowel sounds are normal. There is no distension. Palpations: Abdomen is soft. Tenderness: There is no abdominal tenderness. There is no guarding. Musculoskeletal:         General: Tenderness (Tenderness in the area of deformity) and deformity (Right upper leg, femur, with shortening of the right lower extremity) present. Skin:     General: Skin is warm and dry. Capillary Refill: Capillary refill takes less than 2 seconds. Coloration: Skin is not pale. Findings: No erythema. Neurological:      General: No focal deficit present. Mental Status: She is alert and oriented to person, place, and time. Cranial Nerves: No cranial nerve deficit. Sensory: No sensory deficit.    Psychiatric:         Mood and Affect: Mood normal.         Behavior: Behavior normal.         DIFFERENTIAL  DIAGNOSIS     PLAN (LABS / IMAGING / EKG):  Orders Placed This Encounter   Procedures    COVID-19, Rapid    XR FEMUR RIGHT (MIN 2 VIEWS)    XR KNEE RIGHT (3 VIEWS)    XR HIP 2-3 VW W PELVIS RIGHT    XR TIBIA FIBULA RIGHT (2 VIEWS)    XR CHEST (SINGLE VIEW FRONTAL)    CT HEAD WO CONTRAST    CT CHEST ABDOMEN PELVIS W CONTRAST    CT CERVICAL SPINE WO CONTRAST    CT THORACIC SPINE TRAUMA RECONSTRUCTION    CT LUMBAR SPINE TRAUMA RECONSTRUCTION    CT KNEE RIGHT WO CONTRAST    XR KNEE RIGHT (3 VIEWS)    XR FEMUR RIGHT (MIN 2 VIEWS)    TRAUMA PANEL    Hemoglobin and Hematocrit, Blood, Post Transfusion    Basic Metabolic Panel w/ Reflex to MG    CBC auto differential    Drug screen multi urine    VITAMIN D 25 HYDROXY    Diet NPO Effective Now    VITAL SIGNS PER TRANSFUSION PROTOCOL    Verify hospital blood consent form has been signed and witnessed    TRANSFUSION REACTION MANAGEMENT    Notify patient's primary care physician of admission    Neurovascular checks    Daily weights    Intake and output    Inpatient consult to Orthopedic Surgery    Inpatient consult to Trauma Surgery    Inpatient consult to Neurosurgery    OT eval and treat    PT evaluation and treat    Speech language pathology evaluation    EKG 12 Lead    Type and Screen    PREPARE RBC (CROSSMATCH), 1 Units    PREPARE RBC (CROSSMATCH), 2 Units    PATIENT STATUS (FROM ED OR OR/PROCEDURAL) Inpatient       MEDICATIONS ORDERED:  Orders Placed This Encounter   Medications    fentaNYL (SUBLIMAZE) injection 50 mcg    0.9 % sodium chloride infusion    0.9 % sodium chloride infusion    lidocaine 1 % injection 5 mL    lidocaine 1 % injection     Yamilex O[place]: cabinet override    acetaminophen (TYLENOL) tablet 1,000 mg    0.9 % sodium chloride infusion    OR Linked Order Group     oxyCODONE (ROXICODONE) immediate release tablet 5 mg     oxyCODONE (ROXICODONE) immediate release tablet 10 mg    fentaNYL (SUBLIMAZE) injection 50 mcg    methocarbamol (ROBAXIN) tablet 750 mg    gabapentin (NEURONTIN) capsule 300 mg    polyethylene glycol (GLYCOLAX) packet 17 g    bisacodyl (DULCOLAX) EC tablet 5 mg    sennosides-docusate sodium (SENOKOT-S) 8.6-50 MG tablet 1 tablet    ondansetron (ZOFRAN) injection 4 mg    iopamidol (ISOVUE-370) 76 % injection 75 mL    ceFAZolin (ANCEF) 2000 mg in dextrose 5 % 50 mL IVPB     Order Specific Question:   Antimicrobial Indications Answer:   Surgical Prophylaxis       DDX: Femur fracture, alcohol abuse, hip fracture    MDM/IMPRESSION: This is a 71-year-old female presenting with fall from standing height with obvious femur deformity. Plan for x-rays, trauma and orthopedic surgery evaluation. Pain is well controlled at this time. Patient is neurovascular intact distal to the injury, to include dorsalis pedis, posterior tibialis, and popliteal pulses. Sensation intact distal as well. Probable admission. DIAGNOSTIC RESULTS / EMERGENCY DEPARTMENT COURSE / MDM   LAB RESULTS:  Results for orders placed or performed during the hospital encounter of 04/21/21   COVID-19, Rapid    Specimen: Nasopharyngeal Swab   Result Value Ref Range    Specimen Description . NASOPHARYNGEAL SWAB     SARS-CoV-2, Rapid Not Detected Not Detected   TRAUMA PANEL   Result Value Ref Range    Ethanol 187 (H) <10 mg/dL    Ethanol percent 0.187 (H) <0.010 %    Blood Bank Specimen BILL FOR SERVICES PERFORMED     BUN 3 (L) 8 - 23 mg/dL    WBC 9.1 3.5 - 11.3 k/uL    RBC 2.95 (L) 3.95 - 5.11 m/uL    Hemoglobin 6.7 (LL) 11.9 - 15.1 g/dL    Hematocrit 21.6 (L) 36.3 - 47.1 %    MCV 73.2 (L) 82.6 - 102.9 fL    MCH 22.7 (L) 25.2 - 33.5 pg    MCHC 31.0 28.4 - 34.8 g/dL    RDW 25.1 (H) 11.8 - 14.4 %    Platelets 439 450 - 312 k/uL    MPV 8.4 8.1 - 13.5 fL    NRBC Automated 0.0 0.0 per 100 WBC    CREATININE 0.28 (L) 0.50 - 0.90 mg/dL    GFR Non-African American >60 >60 mL/min    GFR African American >60 >60 mL/min    GFR Comment          GFR Staging NOT REPORTED     Glucose 91 70 - 99 mg/dL    hCG Qual NEGATIVE NEGATIVE    Sodium 121 (L) 135 - 144 mmol/L    Potassium 3.5 (L) 3.7 - 5.3 mmol/L    Chloride 90 (L) 98 - 107 mmol/L    CO2 20 20 - 31 mmol/L    Anion Gap 11 9 - 17 mmol/L    Protime 12.3 9.1 - 12.3 sec    INR 1.2     PTT 25.2 20.5 - 30.5 sec    pH, [place] 7.353 7.320 - 7.420    pCO2, [place] 37.2 (L) 39 - 55    pO2, [place] 42.6 30 - 50    HCO3, Venous 20.2 (L) 24 - 30 mmol/L Positive Base Excess, [place] NOT REPORTED 0.0 - 2.0 mmol/L    Negative Base Excess, [place] 4.4 (H) 0.0 - 2.0 mmol/L    O2 Sat, [place] 68.4 60.0 - 85.0 %    Total Hb NOT REPORTED 12.0 - 16.0 g/dl    Oxyhemoglobin NOT REPORTED 95.0 - 98.0 %    Carboxyhemoglobin 4.7 0 - 5 %    Methemoglobin NOT REPORTED 0.0 - 1.5 %    Pt Temp 37.0     pH, [place], Temp Adj NOT REPORTED 7.320 - 7.420    pCO2, [place], Temp Adj NOT REPORTED 39 - 55 mmHg    pO2, [place], Temp Adj NOT REPORTED 30 - 50 mmHg    O2 Device/Flow/% NOT REPORTED     Respiratory Rate NOT REPORTED     Nemesio Test NOT REPORTED     Sample Site NOT REPORTED     Pt. Position NOT REPORTED     Mode NOT REPORTED     Set Rate NOT REPORTED     Total Rate NOT REPORTED     VT NOT REPORTED     FIO2 INFORMATION NOT PROVIDED     Peep/Cpap NOT REPORTED     PSV NOT REPORTED     Text for Respiratory NOT REPORTED     NOTIFICATION NOT REPORTED     NOTIFICATION TIME NOT REPORTED    TYPE AND SCREEN   Result Value Ref Range    Expiration Date 04/24/2021,2359     Arm Band Number [id number]     ABO/Rh A POSITIVE     Antibody Screen NEGATIVE     Unit Number [id number]     Product Code Leukocyte Reduced Red Cell     Unit Divison 00     Dispense Status ISSUED     Transfusion Status OK TO TRANSFUSE     Crossmatch Result COMPATIBLE          RADIOLOGY:  XR KNEE RIGHT (3 VIEWS)   Final Result   Marginal reduction of displacement and overriding with no change in anterior   angulation. XR FEMUR RIGHT (MIN 2 VIEWS)   Final Result   Marginal reduction of displacement and overriding with no change in anterior   angulation. CT KNEE RIGHT WO CONTRAST   Final Result   Distal right femoral fracture with nondisplaced and possibly incomplete   sagittal plane fracture in the distal portion extending to the lateral aspect   of the intercondylar notch. Small to moderate hemarthrosis. 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   Enchondroma is also fractures more so on the left. And   multiple old mild anterior wedge compression deformities. CT HEAD WO CONTRAST   Final Result   No acute intracranial abnormality. CT CERVICAL SPINE WO CONTRAST   Final Result   No acute abnormality of the cervical spine. XR CHEST (SINGLE VIEW FRONTAL)   Preliminary Result   1. No acute cardiopulmonary disease. XR TIBIA FIBULA RIGHT (2 VIEWS)   Final Result   Distal femur fracture. Please refer to previous reports. Osteoporosis. Old healed distal tibial fracture. 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 Other possibilities include enchondroma or less likely low-grade   chondrosarcoma. Correlation with old studies would be most useful. An   addendum can be made should they become available. XR FEMUR RIGHT (MIN 2 VIEWS)   Final Result   Distal femur fracture as described with associated moderate knee joint   effusion/hemarthrosis. Osteoporosis. XR KNEE RIGHT (3 VIEWS)   Final Result   Comminuted distal right femur fracture as described with intra-articular   extension. Osteoporosis. Moderate knee joint effusion/hemarthrosis. XR HIP 2-3 VW W PELVIS RIGHT   Final Result   No acute abnormality evident. Posttraumatic changes on the left. Osteoporosis. Postop changes of the lumbar fusion and total left hip arthroplasty. EKG      All EKG's are interpreted by the Emergency Department Physician who either signs or Co-signs this chart in the absence of a cardiologist.    EMERGENCY DEPARTMENT COURSE:    Obvious femur deformity on x-ray, orthopedic surgery and trauma surgery evaluated. Patient did have a hemoglobin of 6.7, so I ordered type and screen with cross of 1 unit. Trauma and orthopedic surgery team is aware. They ordered additional imaging. Patient admitted to trauma surgery.     PROCEDURES:      CONSULTS:  IP CONSULT TO ORTHOPEDIC SURGERY  IP CONSULT TO TRAUMA SURGERY  IP CONSULT TO NEUROSURGERY    CRITICAL CARE:      FINAL IMPRESSION      1. Closed fracture of right femur, unspecified fracture morphology, unspecified portion of femur, initial encounter ([facility] [address]          DISPOSITION / [address] Admitted 04/21/2021 02:03:01 AM      PATIENT REFERRED TO:  No follow-up provider specified.     DISCHARGE MEDICATIONS:  New Prescriptions    No medications on file       Theodor Mode, DO  Emergency Medicine Resident    (Please note that portions of thisnote were completed with a voice recognition program.  Efforts were made to edit the dictations but occasionally words are mis-transcribed.)     Theodor Mode, DO  Resident  04/21/21 0600

## 2021-04-21 NOTE — ED NOTES
Pt sleeping, updated on plan, labs drawn/labeled and sent to lab, will continue to monitor     Nuha Estrada RN  04/21/21 9821

## 2021-04-21 NOTE — PROGRESS NOTES
Physical Therapy    DATE: 2021    NAME: Joseph Desai  MRN: [id number]   : 1955      Patient not seen this date for Physical Therapy due to:    Surgery/Procedure: Femur fracture.       Electronically signed by Morenita Miles PT on 2021 at 12:10 PM

## 2021-04-21 NOTE — ED PROVIDER NOTES
[address] [facility]     Emergency Department     Faculty Attestation    I performed a history and physical examination of the patient and discussed management with the resident. I reviewed the residents note and agree with the documented findings and plan of care. Any areas of disagreement are noted on the chart. I was personally present for the key portions of any procedures. I have documented in the chart those procedures where I was not present during the key portions. I have reviewed the emergency nurses triage note. I agree with the chief complaint, past medical history, past surgical history, allergies, medications, social and family history as documented unless otherwise noted below. For Physician Assistant/ Nurse Practitioner cases/documentation I have personally evaluated this patient and have completed at least one if not all key elements of the E/M (history, physical exam, and MDM). Additional findings are as noted. This patient was evaluated in the Emergency Department for symptoms described in the history of present illness. He/she was evaluated in the context of the global COVID-19 pandemic, which necessitated consideration that the patient might be at risk for infection with the SARS-CoV-2 virus that causes COVID-19. Institutional protocols and algorithms that pertain to the evaluation of patients at risk for COVID-19 are in a state of rapid change based on information released by regulatory bodies including the CDC and federal and state organizations. These policies and algorithms were followed during the patient's care in the ED. Patient here with right leg pain after trip and fall. Does admit to alcohol use tonight. No head injury loss consciousness unable ambulate. Right lower extremity is shortened and rotated at the knee. Distally however has intact pulses intact sensation. No other bony injury. Slurring words but otherwise appropriate normal pupils.   Will image, anticipate orthopedics and trauma consult      Critical Care     none    Jung Kumari MD, Amaya Solomon  Attending Emergency  Physician             Jung Kumari MD  04/21/21 4090

## 2021-04-21 NOTE — ED NOTES
PT/OT at bedside, spoke with surgery desk.   State ready for surgery at approx 12pm     Charbel Faulkner RN  04/21/21 1002

## 2021-04-21 NOTE — ANESTHESIA POSTPROCEDURE EVALUATION
Department of Anesthesiology  Postprocedure Note    Patient: Clarisa Charles  MRN: [id number]  YOB: 1955  Date of evaluation: 4/21/2021  Time:  4:20 PM     Procedure Summary     Date: 04/21/21 Room / Location: [address]    Anesthesia Start: 1200 Anesthesia Stop: 4900    Procedure: RIGHT FEMUR INTRAMEDULLARY NAIL INSERTION (Right ) Diagnosis: (RIGHT FEMUR FX.)    Surgeons: Shala Anaya DO Responsible Provider: Reagan Curry MD    Anesthesia Type: general ASA Status: 3          Anesthesia Type: general    Anat Phase I:      Anat Phase II:      Last vitals: Reviewed and per EMR flowsheets.        Anesthesia Post Evaluation    Patient location during evaluation: PACU  Patient participation: complete - patient participated  Level of consciousness: awake and alert  Airway patency: patent  Nausea & Vomiting: no nausea and no vomiting  Complications: no  Cardiovascular status: blood pressure returned to baseline  Respiratory status: acceptable  Hydration status: euvolemic

## 2021-04-21 NOTE — CONSULTS
Orthopedic Surgery Consult  (Dr. Riaz Dhaliwal)                   CC/Reason for consult: Right femur fracture    HPI:    The patient is a 73 y.o. female who we are consulted on for evaluation and management of a right femur fracture. Patient states that earlier this evening she was doing the laundry when she tripped and fell directly onto her right knee. She noted immediate pain and deformity to her right femur and was unable to ambulate afterwards. She crawled back to her living room and the pain did not subside so she called her fiancee and eventually presented to the ED for evaluation. She has a history of osteoporosis on Prolea therapy. She also has a history of a left GALI and ORIF of the left femur for a fracture done by Dr. Josep Plata. She states she also has had surgery on her left tibia as well. Chart review also demonstrates prior pelvic fractures in the form of right superior/inferior pubic rami fractures and bilateral sacral ala fractures that were treated nonoperatively in 2020. She denies hitting her head during the incident nor did she have LOC. She denies any pertinent cardiac history and does not take any blood thinning medications. She admits to drinking this evening. She states that she is active and ambulates without assisted devices on her own at baseline. Past Medical History:    History reviewed. No pertinent past medical history.     Past Surgical History:    Past Surgical History:   Procedure Laterality Date    JOINT REPLACEMENT         Medications Prior to Admission:   Prior to Admission medications    Not on File       Allergies:    Sulfa antibiotics    Social History:   Social History     Socioeconomic History    Marital status: Single     Spouse name: None    Number of children: None    Years of education: None    Highest education level: None   Occupational History    None   Social Needs    Financial resource strain: None    Food insecurity     Worry: None     Inability: None    Transportation needs     Medical: None     Non-medical: None   Tobacco Use    Smoking status: Current Some Day Smoker   Substance and Sexual Activity    Alcohol use: Yes     Comment: 2 tall cans daily    Drug use: Never    Sexual activity: None   Lifestyle    Physical activity     Days per week: None     Minutes per session: None    Stress: None   Relationships    Social connections     Talks on phone: None     Gets together: None     Attends Buddhist service: None     Active member of club or organization: None     Attends meetings of clubs or organizations: None     Relationship status: None    Intimate partner violence     Fear of current or ex partner: None     Emotionally abused: None     Physically abused: None     Forced sexual activity: None   Other Topics Concern    None   Social History Narrative    None       Family History:  History reviewed. No pertinent family history. REVIEW OF SYSTEMS:    General: Negative for fever and chills. Cardiovascular: Negative for chest pain and palpitations. Musculoskeletal: Positive for right femur/knee pain. See HPI   Neurological: Negative for numbness & tingling. 10 remaining systems reviewed and negative    PHYSICAL EXAM:  /87   Pulse 102   Temp 98.6 °F (37 °C) (Oral)   Resp 18   Wt 105 lb (47.6 kg)   SpO2 99%   BMI 18.02 kg/m²     Gen: AAOx3 to person, place, & time, NAD, cooperative to questioning although appears mildly intoxicated  Head: Normocephalic  Neck: Supple  Chest: Non labored breathing, b/l clavicles without TTP, crepitus, step off, or deformity  Cardiovascular: Tachycardic rate, no dependent edema, distal pulses 2+  Respiratory: Chest symmetric, no accessory muscle use, normal respirations    Pelvis: Stable to anterior and lateral compression     RUE: No ecchymosis or deformities. Skin intact. Non tender to palpation. Full AROM without pain shoulder/elbow/wrist/fingers. Compartments soft and compressible. Ulnar/Median/AIN/PIN motor intact. Median/Radial/Ulnar nerve SILT. Hand and fingers warm and well-perfused w/ BCR; radial pulse 2+. LUE:  No ecchymosis. Skin intact. Flexion deformity to the left 4th and 5th digits which patient states is consistent with prior nerve injury. Significant bony prominence at the ulnar styloid that is nontender. Non tender to palpation of the remaining extremity. Full AROM without pain shoulder/elbow. Compartments soft and compressible. Ulnar/Median/AIN/PIN motor intact. Responds to light touch sensation in the Median/Radial/Ulnar nerve distribution. Hand and fingers warm and well-perfused w/ BCR; radial pulse 2+. RLE: Obvious deformity to the distal femur. Skin intact. Tender to palpation at the distal femur. Compartments soft and compressible. EHL/FHL/TA/GSC gross motor intact. Sural, saphenous, superificial/deep peroneal, and plantar nerve distribution SILT. Foot and toes warm and well-perfused w/ BCR; DP pulses 2+. LLE: No ecchymosis or deformities. Skin intact with prior surgical incisions throughout the thigh/leg well healed. Non tender to palpation. Compartments soft and compressible. EHL/FHL gross motor intact. No active ankle ROM observed. Sural, saphenous, superificial/deep peroneal, and plantar nerve distribution SILT. Foot and toes warm and well-perfused w/ BCR; DP pulses 2+. -log roll. Knee ligaments grossly intact. LABS:  Recent Labs     04/21/21  0000   WBC PENDING   HGB PENDING   HCT PENDING   PLT PENDING   INR PENDING   NA PENDING   K PENDING   BUN PENDING   CREATININE PENDING   GLUCOSE PENDING        Radiology:   Multiple xray views of the right knee, femur, and hip demonstrate a displaced spiral distal femur fracture with intra-articular extension. Likely appearing bony infarct vs. Enchondroma of the proximal tibia.      A/P: 73 y.o. female being seen after a fall from standing height with the following:    -Right distal femur fracture     -Patient will require surgical stabilization of the right femur fracture. Appreciate surgical clearance from trauma service. Patient notably had low Hgb on arrival, Hgb 6.7. 1 uprbc ordered. Na level also noted at 121.   -Weight bearing: NWB RLE  -Tibial traction placed at bedside  -NPO  -COVID pending  -F/u CT imaging  -Trauma team to admit as primary  -Pain control per primary  -DVT: Managed per primary. Please hold for surgery  -Ice and elevation for pain/swelling  -Please page Ortho with any questions or concerns    Procedure: Right tibia skeletal traction application  Risks, benefits, and alternatives of skeletal traction were discussed with the patient/family. 20cc of lidocaince was infiltrated laterally and medially on proximal tibia 1cm inferior to tibial tuberosity from skin to periosteum. Skin was sterily prepped and draped before incising skin using #15 blade on lateral aspect of tibia 1cm inferior and 2cm posterior to tibial tuberosity. Blunt dissection to bone was performed using hemostat. A smooth pin was then drilled penetrating both cortices. Traction bow was then applied and dressed with betadine kerlex. Post procedure films demonstrated appropriate placement and improved fracture alignment after weights applied. Patient tolerated the procedure well and expressed interval improvement of symptoms. All questions were answered to the patients/families satisfaction. Skeletal Traction Care: Always ensure the rope/tension bow is not resting directly against the patient's skin. Reposition or pad the area with fluffs/abds/etc as needed to prevent skin breakdown. The limb should be directly in line with the pull of the traction. Ok to remove weights temporarily for transfer/repositioning but always reapply. Ensure that weight are not resting on edge of bed or floor. Ortho team will manage pin sites.     Jessica Salomon,   PGY-2 Orthopedic Surgery  1:02 AM 4/21/2021

## 2021-04-21 NOTE — ED NOTES
The following labs labeled with pt sticker and tubed:     [] Garrett Dayday   [] on ice   [] Blue   [x] Green/yellow  [] Green/black [] on ice  [] Pink  [] Red  [] Yellow     [] COVID-19 swab    [] Rapid     [] Urine Sample  [] Pelvic Cultures    [] Blood Cultures          Karen Rees RN  04/21/21 4670

## 2021-04-22 PROBLEM — E55.9 VITAMIN D DEFICIENCY: Status: ACTIVE | Noted: 2021-01-01

## 2021-04-22 PROBLEM — E83.51 HYPOCALCEMIA: Status: ACTIVE | Noted: 2021-01-01

## 2021-04-22 PROBLEM — D62 ACUTE POSTOPERATIVE ANEMIA DUE TO EXPECTED BLOOD LOSS: Status: ACTIVE | Noted: 2021-01-01

## 2021-04-22 PROBLEM — M21.372 LEFT FOOT DROP: Status: ACTIVE | Noted: 2020-01-01

## 2021-04-22 PROBLEM — R63.4 RECENT UNEXPLAINED WEIGHT LOSS: Status: ACTIVE | Noted: 2021-01-01

## 2021-04-22 PROBLEM — Z72.0 TOBACCO USE: Status: ACTIVE | Noted: 2021-01-01

## 2021-04-22 PROBLEM — S32.810A MULTIPLE FRACTURES OF PELVIS WITH STABLE DISRUPTION OF PELVIC RING, INITIAL ENCOUNTER FOR CLOSED FRACTURE (HCC): Status: ACTIVE | Noted: 2020-01-01

## 2021-04-22 PROBLEM — E87.1 HYPONATREMIA: Status: ACTIVE | Noted: 2021-01-01

## 2021-04-22 NOTE — PROGRESS NOTES
Comprehensive Nutrition Assessment    Type and Reason for Visit:  Initial, Positive Nutrition Screen (Weight Loss, Poor Appetite/PO Intake)    Nutrition Recommendations/Plan: Continue current General Diet as tolerated. Start high calorie oral nutrition supplement (Ensure Enlive) 2x/d as tolerated. Monitor/encourage intakes. Nutrition Assessment:  Patient seen for report of weight loss and poor appetite/intake. Writer unable to speak with patient at time of visit as she was off of the unit for an MRI. Per MD notes, patient reports an unexplained 20-25 lb weight loss which she attributes to poor appetite. Patient is being followed by Gastroenterology as an outpatient for this. Denies nausea/vomiting and diarrhea. Will start a high calorie oral nutrition supplement (Ensure Enlive) to provide additional calories/protein. Malnutrition Assessment:  Malnutrition Status:  Insufficient data    Context:  Chronic Illness     Findings of the 6 clinical characteristics of malnutrition:  Energy Intake:  7 - 75% or less estimated energy requirements for 1 month or longer  Weight Loss:  20-25 lb weight loss per patient     Body Fat Loss:  Unable to assess     Muscle Mass Loss:  Unable to assess    Fluid Accumulation:  No significant fluid accumulation     Strength:  Not Performed    Estimated Daily Nutrient Needs:  Energy (kcal):  2501-2178 kcal/d (25-30 kcal/kg); Weight Used for Energy Requirements:  Current(51.3 kg)     Protein (g):  60-70 g protein/d (1.2-1.4 g/kg); Weight Used for Protein Requirements:  Current(51.3 kg)        Fluid (ml/day):  1550 mL fluid/d; Method Used for Fluid Requirements:  ml/Kg(30)      Nutrition Related Findings:  Labs: Hgb 8.2 (L). Meds: Glycolax, Thiamine. H/o EtOH abuse PTA. Wounds:  Surgical Incision (s/p right femur fracture 4/21)     Current Nutrition Therapies:    DIET GENERAL;     Anthropometric Measures:   · Height: 5' 4\" (162.6 cm)  · Current Body Weight: 113 lb (51.3 kg)

## 2021-04-22 NOTE — CARE COORDINATION
Case Management Initial Discharge Plan  Daniel Gandhi,             Met with:patient to discuss discharge plans. Information verified: address, contacts, phone number, , insurance Yes    Emergency Contact/Next of Kin name & number:   1Aurora Sprague  2. Marlyn Roque No  No Other  Child [phone]         PCP: Dr Jeanie Cohen  Date of last visit: 2 months ago    Insurance Provider: Sherrie    Discharge Planning    Living Arrangements:  Alone   Support Systems:  Family Members    Home has 1 stories  0 stairs to climb to get into front door, 0stairs to climb to reach second floor  Location of bedroom/bathroom in home main    Patient able to perform ADL's:Independent    Current Services (outpatient & in home) none  DME equipment: none  DME provider: none    Receiving oral anticoagulation therapy? No    If indicated:   Physician managing anticoagulation treatment: n/a  Where does patient obtain lab work for ATC treatment? n/a      Potential Assistance Needed:  N/A    Patient agreeable to home care: No  [place] of choice provided:  n/a    Prior SNF/Rehab Placement and Facility: none  Agreeable to SNF/Rehab: No  [place] of choice provided: n/a     Evaluation: no    Expected Discharge date:  21    Patient expects to be discharged to:  home  Follow Up Appointment: Best Day/ Time: Monday AM    Transportation provider: family  Transportation arrangements needed for discharge: No    Readmission Risk              Risk of Unplanned Readmission:        12             Does patient have a readmission risk score greater than 14?: No  If yes, follow-up appointment must be made within 7 days of discharge. Goals of Care: get well again      Discharge Plan: Home independently pcp established may need transportation depending on time of d/c.  Face sheet faxed for correction          Electronically signed by Keith Montague RN on 21 at 8:54 AM EDT

## 2021-04-22 NOTE — CARE COORDINATION
Transition planning:  Met with pt after discussing care needs with Nida OCASIO  Trauma Nurse Coordinator - reviewed her plan to return home and have her marelye come and stay with her for a few days to see how she does. She confirms this is the plan and is agreeable to Home Care. LENNY provided [facility] list and reviewed, request pt to provide 2-3 choices today, offered freedom of choice. She informs she would like to look over and provide later. LENNY spoke with Alyson Mack RN requested to f/u with pt today to see if she has obtained choice and to notify Kimberley OCASIO CM of this so that a referral could be made. She agrees to this plan. Writer will report off to Colby Steele.

## 2021-04-22 NOTE — PROGRESS NOTES
PROGRESS NOTE        PATIENT NAME: Joseph Desai  MEDICAL RECORD NO. [id number]  DATE: 2021  SURGEON: Dr. Feng Raza: No primary care provider on file. HD: # 1    ASSESSMENT    Patient Active Problem List   Diagnosis    Trauma    Fracture of right femur ([facility] [address]    Blood alcohol level of 120-199 mg/100 ml    Alcohol intoxication ([facility] [address]    Anemia    Fracture of multiple ribs of left side    Fracture of eleventh thoracic vertebra ([facility] [address]    Fracture of first lumbar vertebra [facility])       MEDICAL DECISION MAKING AND PLAN    Fall SH to knees, + ETOH    R femur fx   - Ortho consult   - POD1 R femur intramedullary nail insertion    - WBAT to RLE    T11 and L1 acute compression fx              - Many chronic T/L fx   - Neurosurgery consulted - fx appear old   - MRI lumbar and sacrum to evaluate for lytic process, f/u results    L rib fx 8 through 12    - 10, 11 and 12 fx in 2 locations   - Appear old   - Encourage IS    Anemic Hb 6.7   - 2 units PRBC    - Post transfusion Hgb 8.2    Alcohol use   - SW consult   - Monitor for signs of withdrawal   - CIWA    Weight loss    - Recent weight loss of 20 lbs unintentional in setting of lytic lesions   - Medicine consult     Pain MMT  PT/OT  Carb controlled diet -DC IV fluids  Lovenox     Dispo planning pending PT/OT assessment      SUBJECTIVE    Joseph Desai was seen and examined at bedside. She reports that she is doing well. Pain is well controlled after surgery. Has splint in place. She has been eating and drinking normally. She has an external catheter and has been urinating well. Denies passing flatus or bowel movement yet. Is eager to get up with physical therapy today. No signs of alcohol withdrawal at this time.       OBJECTIVE  VITALS: Temp: Temp: 97.7 °F (36.5 °C)Temp  Av.2 °F (36.2 °C)  Min: 96.3 °F (35.7 °C)  Max: 98.8 °F (92.2 °C) BP Systolic (98OKH), OLW:163 , Min:67 , NDL:866   Diastolic (84OEE), MLI:54, Min:47, Max:98   Pulse Pulse  Av.6  Min: 90  Max: 120 Resp Resp  Av.7  Min: 0  Max: 29 Pulse ox SpO2  Av.8 %  Min: 88 %  Max: 100 %  General appearance: alert, appears stated age and cooperative, sleeping comfortably in bed, wakes easily  Head: Normocephalic, without obvious abnormality, atraumatic  Eyes: conjunctivae/corneas clear. Neck: supple, symmetrical, trachea midline  Back: symmetric, no curvature. ROM normal. No CVA tenderness. Lungs: clear to auscultation bilaterally  Heart: regular rate and rhythm, S1, S2 normal, no murmur, click, rub or gallop  Abdomen: soft, non-tender; bowel sounds normal; no masses,  no organomegaly  Extremities:  Right lower extremity is in a splint, dressings not removed for this exam, 2+ DP pulses bilaterally, patient is able to wiggle her toes and has 5/5 dorsi and plantar flexion bilaterally, sensation intact, good cap refill  Neurologic: Grossly normal    I/O last 3 completed shifts: In: 5382.5 [I.V.:1000; Blood:4382.5]  Out: 500 [Blood:500]    Drain/tube output: In: 4032.5 [Blood:4032.5]  Out: 150     LAB:  CBC:   Recent Labs     21  0000 21  0605 21  1358 21  2153 21  0816   WBC 9.1 8.3  --  20.3*  --    HGB 6.7* 8.5* 7.4 6.7* 8.2*   HCT 21.6* 27.9* 23.1 22.1* 27.1*   MCV 73.2* 79.3*  --  82.8  --     258  --  174  --      BMP:   Recent Labs     21  0000 21  0605 21  0808   * 126* 126*   K 3.5* 3.8 3.6*   CL 90* 92* 95*   CO2 20 17* 20   BUN 3* 2* 2*   CREATININE 0.28* <0.20* 0.23*   GLUCOSE 91 75 70     COAGS:   Recent Labs     21  0000   APTT 25.2   INR 1.2       RADIOLOGY:  Xr Chest (single View Frontal)    Result Date: 2021  EXAMINATION: ONE XRAY VIEW OF THE CHEST 2021 1:54 am COMPARISON: None HISTORY: ORDERING SYSTEM PROVIDED HISTORY:  Pre-op TECHNOLOGIST PROVIDED HISTORY:  Pre-op Reason for Exam:  Pre-op supine port FINDINGS: No lines or tubes. Normal cardiomediastinal silhouette.   The Marginal reduction of displacement and overriding with no change in anterior angulation. Xr Femur Right (min 2 Views)    Result Date: 4/21/2021  EXAMINATION: 2 XRAY VIEWS OF THE RIGHT FEMUR 4/21/2021 12:58 am COMPARISON: None. HISTORY: ORDERING SYSTEM PROVIDED HISTORY: fall, deformity TECHNOLOGIST PROVIDED HISTORY: fall, deformity Reason for Exam: Fall distal femur deformity Acuity: Acute Type of Exam: Initial FINDINGS: There is an oblique fracture of the distal right femoral diaphysis with extension into the metaphysis. Within the distal portion, there is also nondisplaced sagittal plane fracture extending to the lateral aspect of the intercondylar notch. There is associated moderate joint effusion/hemarthrosis. 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 Soft tissues are noted for atherosclerotic calcification. Distal femur fracture as described with associated moderate knee joint effusion/hemarthrosis. Osteoporosis. Xr Knee Right (3 Views)    Result Date: 4/21/2021  EXAMINATION: THREE XRAY VIEWS OF THE RIGHT KNEE; 2 XRAY VIEWS OF THE RIGHT FEMUR 4/21/2021 3:53 am COMPARISON: None. HISTORY: ORDERING SYSTEM PROVIDED HISTORY: Right knee post traction TECHNOLOGIST PROVIDED HISTORY: Right knee post traction Reason for Exam: post traction FINDINGS: Following placement of a proximal tibial diaphyseal traction pin, anterior displacement of the distal portion has been reduced from 1 shaft with to 1/2 shaft with and overriding is no longer present. There has been no change in anterior angulation of approximately 25 degrees. The nondisplaced and possibly incomplete sagittal plane fracture through the distal portion extending to the lateral aspect of the intercondylar notch is again noted. The moderate heme arthrosis is again noted. On the cross-table lateral view, a tiny fat level is noted.      Marginal reduction of displacement and overriding with no change in anterior angulation. Xr Knee Right (3 Views)    Result Date: 4/21/2021  EXAMINATION: THREE XRAY VIEWS OF THE RIGHT KNEE 4/21/2021 12:58 am COMPARISON: None. HISTORY: ORDERING SYSTEM PROVIDED HISTORY: femur deformity TECHNOLOGIST PROVIDED HISTORY: femur deformity Reason for Exam: Fall distal femur deformity Acuity: Acute Type of Exam: Initial FINDINGS: There is an oblique fracture of the distal femoral diaphysis extending into the metaphysis with approximately 1 shaft width anterior displacement of the distal portion and mild overriding. Bone density is decreased with probable old small bone infarct in the proximal tibial metaphysis. Sagittal plane fracture line extends through the midline of the distal portion to the lateral aspect of the intercondylar notch. Soft tissues are noted for atherosclerotic calcification and moderate knee joint effusion. Comminuted distal right femur fracture as described with intra-articular extension. Osteoporosis. Moderate knee joint effusion/hemarthrosis. Xr Tibia Fibula Right (2 Views)    Result Date: 4/21/2021  EXAMINATION: XRAY VIEWS OF THE RIGHT TIBIA AND FIBULA 4/21/2021 1:14 am COMPARISON: None. HISTORY: ORDERING SYSTEM PROVIDED HISTORY: Trauma/Fracture TECHNOLOGIST PROVIDED HISTORY: Trauma/Fracture Reason for Exam: fall pain to rt knee FINDINGS: Distal femur fracture. Please refer to previous reports. The tibia and fibula are noted for old healed oblique fracture of the distal tibial metadiaphyseal region. Bone density is decreased with the exception of probable old bone infarct at the proximal tibial metaphysis. Distal femur fracture. Please refer to previous reports. Osteoporosis. Old healed distal tibial fracture. 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 Other possibilities include enchondroma or less likely low-grade chondrosarcoma. Correlation with old studies would be most useful.   An addendum can be made should they become available. Ct Head Wo Contrast    Result Date: 4/21/2021  EXAMINATION: CT OF THE HEAD WITHOUT CONTRAST  4/21/2021 2:38 am TECHNIQUE: CT of the head was performed without the administration of intravenous contrast. Dose modulation, iterative reconstruction, and/or weight based adjustment of the mA/kV was utilized to reduce the radiation dose to as low as reasonably achievable. COMPARISON: None. HISTORY: ORDERING SYSTEM PROVIDED HISTORY: trauma TECHNOLOGIST PROVIDED HISTORY: trauma Decision Support Exception->Emergency Medical Condition (MA) Reason for Exam: trauma Acuity: Acute Type of Exam: Initial FINDINGS: BRAIN/VENTRICLES: There is no acute intracranial hemorrhage, mass effect or midline shift. No abnormal extra-axial fluid collection. The gray-white differentiation is maintained without evidence of an acute infarct. There is no evidence of hydrocephalus. Moderate diffuse cerebral atrophy and mild chronic white matter ischemic change. ORBITS: The visualized portion of the orbits demonstrate no acute abnormality. SINUSES: The visualized paranasal sinuses and mastoid air cells demonstrate no acute abnormality. SOFT TISSUES/SKULL:  No acute abnormality of the visualized skull or soft tissues. No acute intracranial abnormality. Ct Cervical Spine Wo Contrast    Result Date: 4/21/2021  EXAMINATION: CT OF THE CERVICAL SPINE WITHOUT CONTRAST 4/21/2021 2:38 am TECHNIQUE: CT of the cervical spine was performed without the administration of intravenous contrast. Multiplanar reformatted images are provided for review. Dose modulation, iterative reconstruction, and/or weight based adjustment of the mA/kV was utilized to reduce the radiation dose to as low as reasonably achievable. COMPARISON: None.  HISTORY: ORDERING SYSTEM PROVIDED HISTORY: trauma TECHNOLOGIST PROVIDED HISTORY: trauma Decision Support Exception->Emergency Medical Condition (MA) Reason for Exam: trauma the lateral aspect of the intercondylar notch. Small to moderate hemarthrosis. Sclerotic lesion centrally within the tibial metaphysis most likely representing old bone infarct. Enchondroma is also a consideration. Recommend obtaining old comparison studies to document expected stability. Low-grade chondrosarcoma cannot be entirely excluded but is considered unlikely. Fluoro For Surgical Procedures    Result Date: 4/21/2021  Radiology exam is complete. No Radiologist dictation. Please follow up with ordering provider. Ct Chest Abdomen Pelvis W Contrast    Result Date: 4/21/2021  EXAMINATION: CT OF THE CHEST, ABDOMEN, AND PELVIS WITH CONTRAST 4/21/2021 2:38 am TECHNIQUE: CT of the chest, abdomen and pelvis was performed with the administration of intravenous contrast. Multiplanar reformatted images are provided for review. Dose modulation, iterative reconstruction, and/or weight based adjustment of the mA/kV was utilized to reduce the radiation dose to as low as reasonably achievable. COMPARISON: None HISTORY: ORDERING SYSTEM PROVIDED HISTORY: trauma TECHNOLOGIST PROVIDED HISTORY: trauma Reason for Exam: trauma Acuity: Acute Type of Exam: Initial FINDINGS: Chest: Mediastinum:  No evidence of mediastinal hematoma or pneumomediastinum. No acute traumatic injury to the heart or pericardium. The esophagus is fluid-filled and mildly ectatic. Lungs/pleura: Minor bibasilar atelectasis. The lungs are otherwise clear. No pneumothorax or pleural effusion. Soft Tissues/Bones: Multiple posterior and posterolateral left rib fractures are present involving ribs 8-12. Ribs 10, 11 and 12 are fractured in 2 locations. Bone density is diffusely osteoporotic. There are multiple old thoracic compression deformities with possible acute on chronic mild to moderate compression deformity at T11. Abdomen/Pelvis: Organs:  Liver enhances normally without evidence of intrahepatic biliary ductal dilatation.   Cholelithiasis without evidence of cholecystitis. The spleen, pancreas and adrenal glands are unremarkable. The kidneys and ureters are normal. GI/Bowel: Stomach and duodenal sweep demonstrate no acute abnormality. There is no evidence of bowel obstruction. No evidence of abnormal bowel wall thickening or distension. No evidence of appendicitis. Pelvis: The bladder is unremarkable. The patient is status post hysterectomy. Peritoneum/Retroperitoneum: No evidence of free air. No evidence of intraperitoneal/retroperitoneal hemorrhage or fluid. Bones/Soft Tissues: There may be acute mild anterior wedge compression deformity at L1 involving the inferior endplate. Bone density is decreased. The patient is status post posterior instrumented fusion with pedicle screws and rods from L4-S1. Acute nondisplaced left rib fractures posteriorly and laterally involving ribs 8 through 12. Ribs 10, 11 and 12 are fractured in 2 locations. There is no evidence overlying soft tissue injury and therefore does not meet graded criteria for chest wall injury. Possible acute compression deformities of mild degree at T11 and L1. Multiple old healed bilateral rib fractures more so on the left. And multiple old mild anterior wedge compression deformities. Ct Lumbar Spine Trauma Reconstruction    Result Date: 4/21/2021  EXAMINATION: CT OF THE THORACIC SPINE WITHOUT CONTRAST; CT OF THE LUMBAR SPINE WITHOUT CONTRAST  4/21/2021 2:39 am: TECHNIQUE: CT of the thoracic spine was performed without the administration of intravenous contrast. Multiplanar reformatted images are provided for review. Dose modulation, iterative reconstruction, and/or weight based adjustment of the mA/kV was utilized to reduce the radiation dose to as low as reasonably achievable.; CT of the lumbar spine was performed without the administration of intravenous contrast. Multiplanar reformatted images are provided for review.  Dose modulation, iterative reconstruction, and/or weight based adjustment of the mA/kV was utilized to reduce the radiation dose to as low as reasonably achievable. COMPARISON: None. HISTORY: ORDERING SYSTEM PROVIDED HISTORY: trauma TECHNOLOGIST PROVIDED HISTORY: trauma Reason for Exam: trauma Acuity: Acute Type of Exam: Initial; ORDERING SYSTEM PROVIDED HISTORY: TRAUMA TECHNOLOGIST PROVIDED HISTORY: trauma Reason for Exam: trauma Acuity: Acute Type of Exam: Initial FINDINGS: BONES/ALIGNMENT: There is normal alignment of the spine. L4 through S1 posterior lumbar interbody fusion is noted with the bilateral L4 cannulated pedicle screws appearing to extend beyond the superior endplate cortex. T3, T6, T7, T8, T9, T11, L1, L3, L4 and L5 vertebral body chronic appearing vertebral body compression fractures are noted with height loss greatest at the T11 level measuring approximately 50%. . Lumbar and sacral diffuse mixed lytic sclerotic appearance is seen. DEGENERATIVE CHANGES: No gross spinal canal stenosis or bony neural foraminal narrowing of the thoracic spine. SOFT TISSUES: No paraspinal mass is seen. 1. Multilevel thoracolumbar vertebral body remote appearing compression fractures, as discussed above. 2. For clinical suspicion of superimposed acute vertebral compression, recommend MRI thoracic and lumbar spine examinations for further evaluation. 3. L4 through S1 posterior lumbar interbody fusion with L4 vertebral body screws appearing to expand beyond the superior endplate cortex. 4. 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 Cannot exclude association with metastatic disease. Recommend clinical correlation.      Ct Thoracic Spine Trauma Reconstruction    Result Date: 4/21/2021  EXAMINATION: CT OF THE THORACIC SPINE WITHOUT CONTRAST; CT OF THE LUMBAR SPINE WITHOUT CONTRAST  4/21/2021 2:39 am: TECHNIQUE: CT of the thoracic spine was performed without the administration of intravenous contrast. Multiplanar reformatted images are provided for review. Dose modulation, iterative reconstruction, and/or weight based adjustment of the mA/kV was utilized to reduce the radiation dose to as low as reasonably achievable.; CT of the lumbar spine was performed without the administration of intravenous contrast. Multiplanar reformatted images are provided for review. Dose modulation, iterative reconstruction, and/or weight based adjustment of the mA/kV was utilized to reduce the radiation dose to as low as reasonably achievable. COMPARISON: None. HISTORY: ORDERING SYSTEM PROVIDED HISTORY: trauma TECHNOLOGIST PROVIDED HISTORY: trauma Reason for Exam: trauma Acuity: Acute Type of Exam: Initial; ORDERING SYSTEM PROVIDED HISTORY: TRAUMA TECHNOLOGIST PROVIDED HISTORY: trauma Reason for Exam: trauma Acuity: Acute Type of Exam: Initial FINDINGS: BONES/ALIGNMENT: There is normal alignment of the spine. L4 through S1 posterior lumbar interbody fusion is noted with the bilateral L4 cannulated pedicle screws appearing to extend beyond the superior endplate cortex. T3, T6, T7, T8, T9, T11, L1, L3, L4 and L5 vertebral body chronic appearing vertebral body compression fractures are noted with height loss greatest at the T11 level measuring approximately 50%. . Lumbar and sacral diffuse mixed lytic sclerotic appearance is seen. DEGENERATIVE CHANGES: No gross spinal canal stenosis or bony neural foraminal narrowing of the thoracic spine. SOFT TISSUES: No paraspinal mass is seen. 1. Multilevel thoracolumbar vertebral body remote appearing compression fractures, as discussed above. 2. For clinical suspicion of superimposed acute vertebral compression, recommend MRI thoracic and lumbar spine examinations for further evaluation. 3. L4 through S1 posterior lumbar interbody fusion with L4 vertebral body screws appearing to expand beyond the superior endplate cortex.  4. 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 Cannot exclude association with metastatic disease. Recommend clinical correlation. Xr Hip 2-3 Vw W Pelvis Right    Result Date: 4/21/2021  EXAMINATION: ONE XRAY VIEW OF THE PELVIS AND TWO XRAY VIEWS RIGHT HIP 4/21/2021 12:58 am COMPARISON: None. HISTORY: ORDERING SYSTEM PROVIDED HISTORY: fall, deformity TECHNOLOGIST PROVIDED HISTORY: fall, deformity Reason for Exam: Fall distal femur deformity Acuity: Acute Type of Exam: Initial FINDINGS: There is no dislocation or acute fracture evident. Old healed fractures of the left pubic rami and acetabulum are noted. The patient is status post total left hip arthroplasty. There has been prior lower lumbar fusion. Bone density is decreased. Minor degenerative changes present at the right hip. Surrounding soft tissues are unremarkable. No acute abnormality evident. Posttraumatic changes on the left. Osteoporosis. Postop changes of the lumbar fusion and total left hip arthroplasty.          Reina Pedersen DO  4/22/21, 9:40 AM

## 2021-04-22 NOTE — PROGRESS NOTES
Physical Therapy    Facility/Department: [facility]//MED SURG  Initial Assessment    NAME: Deyvi Vasquez  : 1955  MRN: [id number]    Date of Service: 2021  Chief Complaint   Patient presents with    Leg Injury     Right, deformity @ mid femur w/ shortening    Fall     Discharge Recommendations:  Patient would benefit from continued therapy after discharge   Assessment   Body structures, Functions, Activity limitations: Decreased functional mobility ; Decreased endurance;Decreased strength; Increased pain  Assessment: The pt ambulated 20 ft with a RW x min assist with WBAT R LE. She fatigued quickly and reported increased pain with mobilization. She could benefit from a continuation of PT for gait training and strengthening following her DC  Prognosis: Good  Decision Making: Medium Complexity  PT Education: Goals;PT Role;Plan of Care  REQUIRES PT FOLLOW UP: Yes  Activity Tolerance  Activity Tolerance: Patient limited by fatigue;Patient limited by endurance; Patient limited by pain       Patient Diagnosis(es): The encounter diagnosis was Closed fracture of right femur, unspecified fracture morphology, unspecified portion of femur, initial encounter ([facility] [address] has no past medical history on file. has a past surgical history that includes joint replacement and Femur Surgery (Right, 2021).     Restrictions  Restrictions/Precautions  Restrictions/Precautions: Weight Bearing  Required Braces or Orthoses?: No  Lower Extremity Weight Bearing Restrictions  Right Lower Extremity Weight Bearing: Weight Bearing As Tolerated  Position Activity Restriction  Other position/activity restrictions:  Open treatment of right intra-articular supracondylar femur fracture  Vision/Hearing  Vision: Impaired  Vision Exceptions: Wears glasses for reading  Hearing: Within functional limits     Subjective  General  Patient assessed for rehabilitation services?: Yes  Response To Previous Treatment: Not applicable  Family / Gait Deviations: Slow Cristal;Decreased step height;Decreased step length  Distance: amb 20 ft with a RW x min assist with WBAT R LE     Balance  Posture: Good  Sitting - Static: Good  Sitting - Dynamic: Fair  Standing - Static: Fair  Standing - Dynamic: [facility]  Times per week: 5-6x wk  Current Treatment Recommendations: Strengthening, Functional Mobility Training, Gait Training, Safety Education & Training, Endurance Training, Stair training, Pain Management  Safety Devices  Type of devices: Nurse notified, Left in bed, Call light within reach    G-Code     OutComes Score      AM-PAC Score  AM-PAC Inpatient Mobility Raw Score : 17 (04/22/21 1107)  AM-PAC Inpatient T-Scale Score : 42.13 (04/22/21 1107)  Mobility Inpatient CMS 0-100% Score: 50.57 (04/22/21 1107)  Mobility Inpatient CMS G-Code Modifier : CK (04/22/21 1107)       Goals  Short term goals  Time Frame for Short term goals: 10 visits  Short term goal 1: transfers with SBA  Short term goal 2: pt to ambulate 150 ft with a RW x SBA with WBAT R LE  Short term goal 3: ascend/descend 4 steps with SBA  Short term goal 4: 20 min exercise program x SBA  Patient Goals   Patient goals : Return home    Therapy Time   Individual Concurrent Group Co-treatment   Time In 7685         Time Out 0945         Minutes 21             1 of 3955 [address] [place] [address] Check, PT

## 2021-04-22 NOTE — PROGRESS NOTES
Trauma Tertiary Survey / POST OP Note    Admit Date: 4/21/2021  Hospital day 0    [facility] [place]     History reviewed. No pertinent past medical history. Scheduled Meds:   sodium chloride flush  5-40 mL Intravenous 2 times per day    acetaminophen  1,000 mg Oral 3 times per day    methocarbamol  750 mg Oral TID    gabapentin  300 mg Oral TID    polyethylene glycol  17 g Oral Daily    sennosides-docusate sodium  1 tablet Oral BID    thiamine (VITAMIN B1) IVPB  500 mg Intravenous Q8H    ceFAZolin  2,000 mg Intravenous Q8H     Continuous Infusions:   sodium chloride      sodium chloride      sodium chloride 90 mL/hr at 04/21/21 1139     PRN Meds:sodium chloride, sodium chloride flush, sodium chloride, oxyCODONE **OR** oxyCODONE, fentanNYL, bisacodyl, ondansetron    Subjective:     Patient has no complaint of pain at current. Patient is complaining of hunger and wants to eat. Otherwise no other concerns or questions at current. Objective:     Patient Vitals for the past 8 hrs:   BP Temp Pulse Resp SpO2   04/21/21 2000 139/74  101 12 100 %   04/21/21 1830 116/71  107 17 100 %   04/21/21 1730 116/69  103 12 100 %   04/21/21 1630 122/79       04/21/21 1615 127/77       04/21/21 1612 113/85 97.9 °F (36.6 °C) 120 13        I/O last 3 completed shifts: In: 1350 [I.V.:1000; Blood:350]  Out: 350 [Blood:350]  I/O this shift:  In: -   Out: 150 [Blood:150]    Radiology: See radiology report    PHYSICAL EXAM:   GCS:  4 - Opens eyes on own   6 - Follows simple motor commands  5 - Alert and oriented    Pupil size:  Left 2 mm Right 2 mm  Pupil reaction: Yes  Wiggles fingers: Left Yes Right Yes  Hand grasp:   Left normal   Right normal  Wiggles toes: Left Yes    Right Yes  Plantar flexion: Left normal  Right normal    General appearance: alert, appears stated age and cooperative  Head: Normocephalic, without obvious abnormality, atraumatic  Eyes: conjunctivae/corneas clear. PERRL, EOM's intact.  Fundi benign. Neck: supple, symmetrical, trachea midline  Back: symmetric, no curvature. ROM normal. No CVA tenderness.   Lungs: clear to auscultation bilaterally  Heart: regular rate and rhythm, S1, S2 normal, no murmur, click, rub or gallop  Abdomen: soft, non-tender; bowel sounds normal; no masses,  no organomegaly  Extremities: extremities normal, atraumatic, no cyanosis or edema and post op dressing RLE clean and dry  Neurologic: Grossly normal    Spine:     Spine Tenderness ROM   Cervical 0 /10 Normal   Thoracic 0 /10 Normal   Lumbar 0 /10 Normal     Musculoskeletal    Joint Tenderness Swelling ROM   Right shoulder absent absent normal   Left shoulder absent absent normal   Right elbow absent absent normal   Left elbow absent absent normal   Right wrist absent absent normal   Left wrist absent absent normal   Right hand grasp absent absent normal   Left hand grasp absent absent normal   Right hip Present, post op ORIF absent Limited due to pain   Left hip absent absent normal   Right knee absent absent normal   Left knee absent absent normal   Right ankle absent absent normal   Left ankle absent absent normal   Right foot absent absent normal   Left foot absent absent normal     CONSULTS: Neurosurgery, orthopedic surgery    PROCEDURES: R femur ORIF per ortho    INJURIES:  T11, L1 fx, L 8-12 rib fx, R femur fx      Patient Active Problem List   Diagnosis    Trauma    Fracture of right femur ([address]    Blood alcohol level of 120-199 mg/100 ml    Alcohol intoxication ([address]    Anemia    Fracture of multiple ribs of left side    Fracture of eleventh thoracic vertebra (HCC)    Fracture of first lumbar vertebra (HCC)       Assessment/Plan:     Fall SH to knees  ETOH  R femur fx  T11 and L1 acute compression fx              - Many chronic T/L fx  L rib fx 8 through 12 - 10, 11 and 12 fx in 2 locations  Anemic Hb 6.7    Refer to H&P and daily progress note for plan

## 2021-04-22 NOTE — PLAN OF CARE
MRI lumbar/sacrum reviewed with patient showing tumor  No neurosurgical interventions      NEUROSURGERY TO SIGN OFF     Please contact Neurosurgery with any questions. PATIENT TO FOLLOW UP IN CLINIC:  ---  Follow-up with Neurosurgery  [address]/[facility] (Medical Office Building [address])  [id number]  Call [phone] for an appointment.   --    Electronically signed by MARGA Woods NP on 4/22/2021 at 2:34 PM

## 2021-04-22 NOTE — CARE COORDINATION
SBIRT completed  Pt with +ETOH tox on admission    Pt reports she drinks daily, two 24oz beers  She denies any drug use  Pt stated she was not concerned about her drinking because \"I can control it\"  Pt stated she had half a can of beer prior to her fall  Pt stated she has been thru tx 10yrs ago at [facility] and had OP tx years ago at [facility]  She reports staying sober a couple of months  Pt stated she used to attend AA but has not been to a meeting in over a year  Discussed getting into a tx program and pt declined. She also declined tx resources but stated she may consider going back to  as she liked the meetings. Pt declined [place] [address] directory, stating she has one at home  Pt denied feelings of depression            Alcohol Screening and Brief Intervention        Recent Labs     04/21/21  0000   *       Alcohol Pre-screening     (WOMEN ONLY) How many times in the past year have you had 4 or more drinks in a day?: 1 or more    Alcohol Screening Audit  TOTAL SCORE[de-identified] 10    Drug Pre-Screening   How many times in the past year have you used a recreational drug or used a prescription medication for nonmedical reasons?: None    Drug Screening DAST       Mood Pre-Screening (PHQ-2)  During the past two weeks, have you been bothered by little interest or pleasure in doing things?: No  During the past two weeks, have you been bothered by feeling down, depressed, or hopeless?: No    Mood Pre-Screening (PHQ-9)         I have interviewed Clarisa Charles, [id number] regarding  Her alcohol consumption/drug use and risk for excessive use. Screenings were positive.   Patient  Declined intervention at this time but stated she may consider going back to [place] [address]  Deferred []    Completed on: 4/22/2021   LIO Peralta

## 2021-04-22 NOTE — PLAN OF CARE
Problem: Pain:  Goal: Pain level will decrease  Description: Pain level will decrease  4/22/2021 1510 by Edilma Cruz RN  Outcome: Ongoing     Problem: Pain:  Goal: Control of acute pain  Description: Control of acute pain  4/22/2021 1510 by Edilma Cruz RN  Outcome: Ongoing     Problem: Pain:  Goal: Control of chronic pain  Description: Control of chronic pain  4/22/2021 1510 by Edilma Cruz RN  Outcome: Ongoing     Problem: Falls - Risk of:  Goal: Will remain free from falls  Description: Will remain free from falls  4/22/2021 1510 by Edilma Cruz RN  Outcome: Ongoing     Problem: Falls - Risk of:  Goal: Absence of physical injury  Description: Absence of physical injury  4/22/2021 1510 by Edilma Cruz RN  Outcome: Ongoing     Problem: Skin Integrity:  Goal: Will show no infection signs and symptoms  Description: Will show no infection signs and symptoms  4/22/2021 1510 by Edilma Cruz RN  Outcome: Ongoing     Problem: Skin Integrity:  Goal: Absence of new skin breakdown  Description: Absence of new skin breakdown  4/22/2021 1510 by Edilma Cruz RN  Outcome: Ongoing     Problem: Musculor/Skeletal Functional Status  Goal: Highest potential functional level  Outcome: Ongoing     Problem: Nutrition  Goal: Optimal nutrition therapy  Description: Nutrition Problem #1: Unintended weight loss  Intervention: Food and/or Nutrient Delivery: Continue Current Diet, Start Oral Nutrition Supplement  Nutritional Goals: PO intake to meet >75% of estimated nutrition needs      Outcome: Ongoing

## 2021-04-22 NOTE — CONSULTS
[facility]                                                                                                                                      Office: 1 Elina Delgado, DO, Myesha Kunz, DO, Oneyda Echeverria, DO, Pardeep Alcantara, DO, Nolvia Trimble MD, Bruce He MD, Gianna Langston MD, Cristino Chandler MD, Kanchan Coates MD, Exie Ahumada, MD, Edmundo Contreras MD, Michael Arias MD, Angelique Contreras MD, Miguel Ángel Dexter DO, Amber Gao MD, Allie Gan MD, Axel Rowell DO, Apollo Mckeon MD,  Dwain Albright DO, Harpal Valentin MD, Gustavo Cerrato MD, Kostas Banks, [facility], [facility], [facility], Des Nichols, CNP, Chan Hagan, CNS, [place], CNP, Danny Josh, CNP, Oscar Judd, CNP, Yifan Diaz, CNP, Jimmy Loredo, CNP, Jose Singh PA-C, Alexander Couch, DNP, Chayito Monte, CNP, Rupa Benson, CNP, Emilia Pro, CNP, Vargas Haro, CNP, Jake Robles, [place]      [facility]  Internal Medicine Consultation  [address]  [id number]  4/22/2021  Reason for Consult:  Medical Management     Requesting Physician:  Louie Lau MD     PCP - No primary care provider on file. The patient has been examined and the chart was reviewed. CHIEF COMPLAINT:  Fall     History Obtained From:  patient, electronic medical record    HISTORY OF PRESENT ILLNESS:   Patient was admitted thru ER with:  Yakelin Baron is a 73 y.o. female who presents with right upper leg pain. Patient fell from standing height while going to switch her laundry. Patient has a femur deformity with shortening of the right leg. Did not hit her head or lose conscious. Patient does drink daily. Pain well controlled at this time. Patient was given 50 mcg of fentanyl by EMS. Patient does have good pulses and sensation distal to the injury. \"     And reports that she was in a hurry to get down to the laundromat before her cycle finished and her close were removed by other people  She tripped over a curb and sustained multiple traumatic injuries including:  Right femur fracture  T11, L1 compression fractures  Left Rib fractures 8, 9, 10, 11, 12    They are now postop:  RIGHT FEMUR INTRAMEDULLARY NAIL   Postop x-ray:  Impression:  Postoperative changes from internal fixation of the comminuted distal femoral   fracture.      Postop hemoglobin had dropped to 6.7  Blood transfusion has been ordered    Sodium 126  Potassium 3.6  Calcium 7.8    Blood alcohol level was 0.187      Past Medical History:    Past Medical History:   Diagnosis Date    Falls     Osteoporosis     Vitamin D deficiency        Past Surgical History:    Past Surgical History:   Procedure Laterality Date    FEMUR SURGERY Right 04/21/2021    RIGHT FEMUR INTRAMEDULLARY NAIL INSERTION (Right )    JOINT REPLACEMENT         Current Medications:    Current Facility-Administered Medications: enoxaparin (LOVENOX) injection 30 mg, 30 mg, Subcutaneous, BID  oxyCODONE (ROXICODONE) immediate release tablet 5 mg, 5 mg, Oral, Q6H PRN  LORazepam (ATIVAN) injection 1 mg, 1 mg, Intravenous, Q4H PRN  0.9 % sodium chloride infusion, , Intravenous, PRN  sodium chloride flush 0.9 % injection 5-40 mL, 5-40 mL, Intravenous, 2 times per day  sodium chloride flush 0.9 % injection 5-40 mL, 5-40 mL, Intravenous, PRN  0.9 % sodium chloride infusion, 25 mL, Intravenous, PRN  acetaminophen (TYLENOL) tablet 1,000 mg, 1,000 mg, Oral, 3 times per day  0.9 % sodium chloride infusion, , Intravenous, Continuous  methocarbamol (ROBAXIN) tablet 750 mg, 750 mg, Oral, TID  gabapentin (NEURONTIN) capsule 300 mg, 300 mg, Oral, TID  polyethylene glycol (GLYCOLAX) packet 17 g, 17 g, Oral, Daily  bisacodyl (DULCOLAX) EC tablet 5 mg, 5 mg, Oral, Daily PRN  sennosides-docusate sodium (SENOKOT-S) 8.6-50 MG tablet 1 tablet, 1 tablet, Oral, BID  ondansetron (ZOFRAN) injection 4 mg, 4 mg, Intravenous, Q6H PRN  thiamine (B-1) 500 mg in sodium chloride 0.9 % 100 mL IVPB, 500 mg, Intravenous, Q8H  ceFAZolin (ANCEF) 2000 mg in dextrose 5 % 50 mL IVPB, 2,000 mg, Intravenous, Q8H  0.9 % sodium chloride infusion, , Intravenous, PRN    Allergies:  Sulfa antibiotics    Social History:    TOBACCO:   reports that she has been smoking. She does not have any smokeless tobacco history on file. ETOH:   reports current alcohol use. FamilyHistory:       Problem Relation Age of Onset    Cancer Mother         Patient believes the primary site was ovarian    Prostate Cancer Father        REVIEW OF SYSTEMS:    Review of Systems   Constitutional: Positive for activity change (Reduced) and unexpected weight change (The patient reports she is currently undergoing work-up for an unexplained 20 pound weight loss). Negative for appetite change, chills, diaphoresis, fatigue and fever. HENT: Negative for congestion and nosebleeds. Eyes: Negative for photophobia and visual disturbance. Respiratory: Negative for cough, shortness of breath and wheezing. Cardiovascular: Positive for chest pain (Overlying left ribs). Negative for palpitations. Gastrointestinal: Negative for abdominal distention, blood in stool, nausea and vomiting. Genitourinary: Negative for flank pain and hematuria. Musculoskeletal: Positive for arthralgias, back pain, gait problem and myalgias. She is still having lumbothoracic back pain  Her hip is still stiff and sore  History of spinal stenosis   Skin: Negative for rash and wound. Neurological: Positive for weakness (History of foot drop). Negative for dizziness and light-headedness. PHYSICAL EXAM:   Vitals:    /69   Pulse 102   Temp 97.7 °F (36.5 °C)   Resp 16   Ht 5' 4\" (1.626 m)   Wt 113 lb (51.3 kg)   SpO2 99%   BMI 19.40 kg/m²   Physical Exam  Vitals signs reviewed. Constitutional:       General: She is not in acute distress. Appearance: She is not diaphoretic. HENT:      Head: Normocephalic.       Nose: Nose normal.   Eyes:      General: No scleral icterus. Conjunctiva/sclera: Conjunctivae normal.   Neck:      Musculoskeletal: Neck supple. Trachea: No tracheal deviation. Cardiovascular:      Rate and Rhythm: Normal rate and regular rhythm. Pulmonary:      Effort: Pulmonary effort is normal. No respiratory distress. Breath sounds: Normal breath sounds. No wheezing or rales. Comments: No crepitation  No subcutaneous emphysema  Chest:      Chest wall: Tenderness (Overlying left rib fractures) present. Abdominal:      General: Bowel sounds are normal. There is no distension. Palpations: Abdomen is soft. Tenderness: There is no abdominal tenderness. Musculoskeletal:         General: Tenderness (Incisional) and deformity (Hand contracture noted) present. Skin:     General: Skin is warm and dry. Comments: Wound is dressed, dry and clean    Neurological:      Mental Status: She is alert and oriented to person, place, and time.          DATA:    Recent Results (from the past 24 hour(s))   CV HGB/HCT    Collection Time: 04/21/21  1:58 PM   Result Value Ref Range    Hemoglobin 7.4 gm/dL    Hematocrit 23.1 %   CBC Auto Differential    Collection Time: 04/21/21  9:53 PM   Result Value Ref Range    WBC 20.3 (H) 3.5 - 11.3 k/uL    RBC 2.67 (L) 3.95 - 5.11 m/uL    Hemoglobin 6.7 (LL) 11.9 - 15.1 g/dL    Hematocrit 22.1 (L) 36.3 - 47.1 %    MCV 82.8 82.6 - 102.9 fL    MCH 25.1 (L) 25.2 - 33.5 pg    MCHC 30.3 28.4 - 34.8 g/dL    RDW 22.0 (H) 11.8 - 14.4 %    Platelets 924 039 - 660 k/uL    MPV 8.9 8.1 - 13.5 fL    NRBC Automated 0.0 0.0 per 100 WBC    Differential Type NOT REPORTED     WBC Morphology NOT REPORTED     RBC Morphology NOT REPORTED     Platelet Estimate NOT REPORTED     Immature Granulocytes 0 0 %    Seg Neutrophils 84 (H) 36 - 66 %    Lymphocytes 9 (L) 24 - 44 %    Monocytes 6 1 - 7 %    Eosinophils % 1 1 - 4 %    Basophils 0 0 - 2 %    Absolute Immature Granulocyte 0.00 0.00 - 0.30 k/uL    Segs Absolute 17.05 (H) 1.8 - 7.7 k/uL    Absolute Lymph # 1.83 1.0 - 4.8 k/uL    Absolute Mono # 1.22 (H) 0.1 - 0.8 k/uL    Absolute Eos # 0.20 0.0 - 0.4 k/uL    Basophils Absolute 0.00 0.0 - 0.2 k/uL    Morphology ANISOCYTOSIS PRESENT     Morphology MICROCYTOSIS PRESENT    HEMOGLOBIN AND HEMATOCRIT, BLOOD    Collection Time: 04/22/21  8:16 AM   Result Value Ref Range    Hemoglobin 8.2 (L) 11.9 - 15.1 g/dL    Hematocrit 27.1 (L) 36.3 - 47.1 %       Findings:   <principal problem not specified>  Active Hospital Problems    Diagnosis Date Noted    Acute postoperative anemia due to expected blood loss [D62] 04/22/2021    Hyponatremia [E87.1] 04/22/2021    Hypocalcemia [E83.51] 04/22/2021    Recent unexplained weight loss [R63.4] 04/22/2021    Tobacco use [Z72.0] 04/22/2021    Trauma [place] 04/21/2021    Fracture of right femur ([address] Marci Mackay 04/21/2021    Blood alcohol level of 120-199 mg/100 ml [Y90.6] 04/21/2021    Alcohol intoxication ([address] [F10.929] 04/21/2021    Anemia [D64.9] 04/21/2021    Fracture of multiple ribs of left side [S22.42XA] 04/21/2021    Fracture of eleventh thoracic vertebra ([address] Exie Moors 04/21/2021    Fracture of first lumbar vertebra ([address] Ciara Devlin 04/21/2021       Recommendations:  Trauma evaluation in progress  Ortho evaluation in progress  Detox  Ativan  Thiamine okay  Transfuse as needed  Smoking cessation / education   Pain management  Correct electrolyte abnormalities   DVT prophylaxis - on Lovenox  Complete work-up on outpatient basis for unexplained weight loss  (Colonoscopy planned later this month)     See orders  Will follow    IP CONSULT TO ORTHOPEDIC SURGERY  IP CONSULT TO TRAUMA SURGERY  IP CONSULT TO NEUROSURGERY  IP CONSULT TO INTERNAL MEDICINE  IP CONSULT TO IV TEAM  IP CONSULT TO IV TEAM    Electronically signed by Barbara Enriquez DO on 4/22/2021 at 11:39 AM  Copy of H&P to No primary care provider on file.

## 2021-04-22 NOTE — PROGRESS NOTES
Neurosurgery YAYA/Resident    Daily Progress Note   CC:  Chief Complaint   Patient presents with    Leg Injury     Right, deformity @ mid femur w/ shortening    Fall     4/22/2021  6:17 AM    Chart reviewed. No acute events overnight. No new complaints. Appears to be doing well this morning, eating breakfast, denies numbness tingling pain bilateral upper and bilateral lower extremities. Had Ortho procedure done yesterday for right femur fracture. Chronic left foot drop. Denies any back pain at this time. Vitals:    04/22/21 0210 04/22/21 0215 04/22/21 0220 04/22/21 0345   BP: 106/60 104/69 118/66 97/66   Pulse: 100 93 90 92   Resp: 18 17 18 19   Temp: 98 °F (36.7 °C) 97.9 °F (36.6 °C) 98.3 °F (36.8 °C) 98 °F (36.7 °C)   TempSrc: Oral Oral Oral Oral   SpO2: 99% 99% 96% 94%   Weight:       Height:           PE:   AOx3   PERRL, EOMI  Motor   L deltoid 5/5; R deltoid 5/5  L biceps 5/5; R biceps 5/5  L triceps 5/5; R triceps 5/5    L iliopsoas 5/5 , R iliopsoas 5/5  L quadriceps 5/5; R quadriceps 5/5  L Dorsiflexion 5/5; R dorsiflexion 5/5  L Plantarflexion 5/5; R plantarflexion 5/5  L EHL 5/5; R EHL 5/5      Sensation: Intact       Lab Results   Component Value Date    WBC 20.3 (H) 04/21/2021    HGB 6.7 (LL) 04/21/2021    HCT 22.1 (L) 04/21/2021     04/21/2021     (L) 04/21/2021    K 3.6 (L) 04/21/2021    CL 95 (L) 04/21/2021    CREATININE 0.23 (L) 04/21/2021    BUN 2 (L) 04/21/2021    CO2 20 04/21/2021    INR 1.2 04/21/2021       Radiology   Pending MRI with and without lumbar and sacral spine      A/P  73 y.o. female who presents with chronic thoracolumbar compression fracture     Pending MRI lumbar/sacrum to evaluate lytic process   No neurosurgical interventions needed      Please contact neurosurgery with any changes in patients neurologic status.        Aki Valdez CNP  4/22/21  6:17 AM

## 2021-04-22 NOTE — PLAN OF CARE
Nutrition Problem #1: Unintended weight loss  Intervention: Food and/or Nutrient Delivery: Continue Current Diet, Start Oral Nutrition Supplement  Nutritional Goals: PO intake to meet >75% of estimated nutrition needs

## 2021-04-22 NOTE — OP NOTE
[facility]                                OPERATIVE REPORT    PATIENT NAME: Lashanda Koroma                   :        1955  MED REC NO:   [id number]                             ROOM:       2963  ACCOUNT NO:   [de-identified]                           ADMIT DATE: 2021  PROVIDER:     Gaby Sumner    DATE OF PROCEDURE:  2021    PREOPERATIVE DIAGNOSIS:  Right intra-articular supracondylar femur  fracture. POSTOPERATIVE DIAGNOSIS:  Right intra-articular supracondylar femur  fracture. PROCEDURE:  Open treatment of right intra-articular supracondylar femur  fracture, CPT 32181. SURGEON:  Gaby Sumner DO    ASSISTANTS:  [facility], , PGY-3  2. Latisha Hathaway MD, PGY-1    ANESTHESIA:  General.    ESTIMATED BLOOD LOSS:  500 mL. IV FLUIDS:  1500 mL of crystalloid, 1 unit of packed red blood cells and  250 mL of albumin. COMPLICATIONS:  None. SPECIMENS:  None. IMPLANTS:  1.  [place] 13 x 380 mm T2 SCN intramedullary nail with condylar screws  and bolts. 2.  [place] 6.5-mm cancellous screw x2. INDICATIONS:  The patient is a 71-year-old female who sustained a  intra-articular supracondylar fracture of the right lower extremity as a  result of a fall from standing height. Surgical intervention was  recommended and the risks, benefits and alternatives were discussed in  great detail. The patient wished to proceed and the operative site was  marked. The patient was then provided written informed consent. She  was hyponatremic and anemic and the appropriate services were consulted  for management and were able to provide clearance. DESCRIPTION OF PROCEDURE:  The patient was transported to the operating  room. General anesthesia was administered by the anesthesia provider  without complication.   She was then transferred to a radiolucent  flat-top table in the supine position with a bump under the right hip  and right arm secured over the chest.  All bony prominences were well  padded, and the patient was adequately secured to the table. The pin  was left in the right proximal tibia with the remaining portions of the  skeletal traction were removed. The right lower extremity was isolated,  scrubbed with Hibiclens followed by alcohol and prepped and draped in  the usual sterile fashion. A time-out was performed that included all  involved parties in correctly identifying the patient, planned  procedure, and operative site. After everyone agreed, we continued. We  placed a triangle under the right femur and applied 15 pounds of sterile  skeletal traction to the pre-existing proximal tibia pin. We placed a  periarticular clamp around the articular block, as the articular split  was in the sagittal plane. This was placed through medial and lateral  percutaneous incisions in order to compress and secure the articular  block. An additional large point-of reduction clamp was placed for the  same purpose; however, this was placed through an anterior midline  incision and the tines were placed to each of the quadriceps muscle and  then slid around the medial and lateral side and tightened again to  address this mid sagittal split of the articular block. Once this was  secured, we manipulated the distal segment in relation to the proximal  segment addressing the coronal plane split. A bump was placed under the  distal aspect to correct the apex posterior deformity. We were still  fighting slight valgus displacement and lateral translation of the  distal segment. Therefore, over this fracture site, a direct lateral  incision was made and the collinear clamp was introduced. This was able  to appropriately correct and maintain compression across the more  proximal portion of the fracture.   With the fracture well reduced and  secured with various clamps, we then completed a standard infrapatellar  approach for a retrograde intramedullary nail. The guidewire was placed  into the distal femur after appropriate location start point on AP and  lateral views of the knee. The wire was then advanced but the bone was  extremely osteoporotic and did not hold its path well at all. We then  carefully used the opening reamer, with frequent checks of the AP and  lateral view of the knee to assure appropriate angle of insertion. Once  the open reamer was fully seated, everything was then removed and  exchanged for a ball-tip guidewire, which was advanced through the  intramedullary canal in retrograde fashion until it was well seated in  the proximal femur. A measurement was obtained. We then reamed to a  14-mm reamer with very minimal chatter. The largest nail diameter  available was 13; therefore, we would like to place a [place] T2 SCN 13  x 380 mm retrograde nail. Once it was fully seated and appropriately  rotated based on fluoroscopic imaging, we placed two medial to lateral  condylar screws with the associated nut and washer using standard  manufacture technique with percutaneous incision on both the medial and  lateral aspect of the knee. Once both sets of condylar screws were  fully tightened, the washer were noted to be flush against the cortices  and appeared to be maintaining the compression complied by the clamps,  they were currently still in place. We then placed the two oblique  screws again through the attached aiming arm and trocar system with  percutaneous incisions. Those screws had very poor purchase. We then  placed two anterior to posterior-directed interlocking screws in the  proximal aspect of the nail through an anterior incision centered over  the Tinel's.   In order to further secure and maintain the compression  across the sagittal plane split, we placed two [place] 6.5-mm partially  threaded cancellous screws from lateral to medial direction, one wound  check and anticipate suture removal at that time.         Alberto Flores    D: 04/21/2021 16:50:14       T: 04/21/2021 16:57:54     LUNA/KAVITHA_José Manuel  Job#: [id number]     Doc#: [id number]    CC:

## 2021-04-22 NOTE — PROGRESS NOTES
Occupational Therapy   Occupational Therapy Initial Assessment  Date: 2021   Patient Name: Mayte Jones  MRN: [id number]     : 1955    Chief Complaint   Patient presents with    Leg Injury     Right, deformity @ mid femur w/ shortening    Fall     Date of Service: 2021    Discharge Recommendations:  Further therapy recommended at discharge. OT Equipment Recommendations  Equipment Needed: Yes  Mobility Devices: ADL Assistive Devices  ADL Assistive Devices: Reacher    Assessment   Performance deficits / Impairments: Decreased functional mobility ; Decreased ADL status; Decreased high-level IADLs;Decreased balance;Decreased safe awareness  Assessment: pt limited by pain in R LE, decreased balance, and decreased safety awareness, impacting pt ability to complete ADL's and functional mobility independently and safely. Pt would benefit from continued acute OT services and OT after discharge. Treatment Diagnosis: Fracture of right femur ([facility]  Prognosis: Good  Decision Making: Medium Complexity  Patient Education: pt ed on OT role, POC, purpose of eval,  hand placement during transfers, weight bearing status, and walker usage. Good return. REQUIRES OT FOLLOW UP: Yes  Activity Tolerance  Activity Tolerance: Patient Tolerated treatment well;Patient limited by pain  Safety Devices  Safety Devices in place: Yes  Type of devices: Left in bed;Gait belt;Call light within reach  Restraints  Initially in place: No           Patient Diagnosis(es): The encounter diagnosis was Closed fracture of right femur, unspecified fracture morphology, unspecified portion of femur, initial encounter ([facility] has a past medical history of Falls, Osteoporosis, and Vitamin D deficiency. has a past surgical history that includes joint replacement and Femur Surgery (Right, 2021).     Treatment Diagnosis: [facility]      Restrictions  Restrictions/Precautions  Restrictions/Precautions: Weight Bearing Required Braces or Orthoses?: No  Lower Extremity Weight Bearing Restrictions  Right Lower Extremity Weight Bearing: Weight Bearing As Tolerated  Position Activity Restriction  Other position/activity restrictions: 4/21 4/21 Open reduction with IMN R femur. Subjective   General  Patient assessed for rehabilitation services?: Yes  Family / Caregiver Present: Yes(marely)  Diagnosis: Fracture of right femur  General Comment  Comments: RN ok'd pt for therpay. Pt cooperative and pleasant throughout. Patient Currently in Pain: Yes  Pain Assessment  Pain Assessment: 0-10  Pain Level: 6  Pain Type: Acute pain  Pain Location: Leg  Non-Pharmaceutical Pain Intervention(s): Ambulation/Increased Activity; Distraction; Therapeutic presence  Response to Pain Intervention: Patient Satisfied    Social/Functional History  Social/Functional History  Lives With: Alone  Type of Home: House  Home Layout: One level  Home Access: Level entry  Bathroom Shower/Tub: Tub/Shower unit  Bathroom Toilet: Handicap height  Bathroom Equipment: Grab bars in shower, Grab bars around toilet, Shower chair  Bathroom Accessibility: Accessible  Home Equipment: Rolling walker(pt reports not usually using DME)  ADL Assistance: Independent  Homemaking Assistance: Independent  Homemaking Responsibilities: Yes  Meal Prep Responsibility: Primary  Laundry Responsibility: Primary  Cleaning Responsibility: Primary  Ambulation Assistance: Independent  Transfer Assistance: Independent  Active : No(pt reports family and med services drive)  Patient's  Info: Daughter, son , or fiance drives, or med cab  Mode of Transportation: Car  Occupation: On disability  Leisure & Hobbies: Cooking, sewing, art       Objective   Vision: Impaired  Vision Exceptions: Wears glasses for reading  Hearing: Within functional limits      Orientation  Overall Orientation Status: Within Functional Limits     Balance  Sitting Balance: Supervision(~5min, EOB)  Standing Balance: Minimal assistance  Standing Balance  Time: ~3 min  Activity: pt completed functional mobility from bed<>hallway  Comment: With RW. pt required min A for walker navigation and min VC for proper gait pattern. Pt experienced no buckling or LOB but no further functional mobility due to pt reported pain and request to sit. Pt unable to bear full weight in R LE due to pain. ADL  Feeding: Modified independent ;Setup  Grooming: Modified independent ;Setup  UE Bathing: Modified independent ;Setup  LE Bathing: Moderate assistance  UE Dressing: Modified independent ;Setup  LE Dressing: Moderate assistance  Toileting: Moderate assistance     Tone RUE  RUE Tone: Normotonic  Tone LUE  LUE Tone: Normotonic  Coordination  Movements Are Fluid And Coordinated: Yes     Bed mobility  Supine to Sit: Minimal assistance  Sit to Supine: Minimal assistance  Scooting: Stand by assistance  Comment: pt required assist for progression of BLE     Transfers  Sit to stand: Moderate assistance  Stand to sit: Minimal assistance  Transfer Comments: with RW. Cognition  Overall Cognitive Status: WFL     Sensation  Overall Sensation Status: WFL      LUE AROM (degrees)  LUE AROM : WFL  Left Hand AROM (degrees)  Left Hand AROM: WFL  RUE AROM (degrees)  RUE AROM : WFL  Right Hand AROM (degrees)  Right Hand AROM: WFL     LUE Strength  Gross LUE Strength: WFL  L Hand General: 5/5  RUE Strength  Gross RUE Strength: WFL  R Hand General: 5/5     Plan   Plan  Times per week: 2-3x/wk    AM-PAC Score     AM-[facility] Inpatient Daily Activity Raw Score: 18 (04/22/21 1120)  AM-PAC Inpatient ADL T-Scale Score : 38.66 (04/22/21 1120)  ADL Inpatient CMS 0-100% Score: 46.65 (04/22/21 1120)  ADL Inpatient CMS G-Code Modifier : CK (04/22/21 1120)    Goals  Short term goals  Time Frame for Short term goals: By discharge, pt will:  Short term goal 1: Complete LB ADL's and toileting with min assist and A/E PRN.   Short term goal 2: Complete functonal transfers/ functional mobility with CGA and LRD. Short term goal 3: Tolerate ~8 min of static/dynamic standing with CGA to support performace of standing occupations. Short term goal 4: Demo good safety awareness during functional mobility/functional transfers with <2 VC.        Therapy Time   Individual Concurrent Group Co-treatment   Time In 0926         Time Out 0948         Minutes 22      Co Eval with PT   Timed Code Treatment Minutes: [address] Student OT

## 2021-04-22 NOTE — DISCHARGE INSTR - COC
Continuity of Care Form    Patient Name: Josselin Benito   :  1955  MRN:  [id number]    Admit date:  2021  Discharge date:  ***    Code Status Order: Full Code   Advance Directives:   Advance Care Flowsheet Documentation       Date/Time Healthcare Directive Type of Healthcare Directive Copy in [address] Agent's Name Healthcare Agent's Phone Number    21 1114  No, patient does not have an advance directive for healthcare treatment -- -- -- -- --            Admitting Physician:  Mich Hunter MD  PCP: No primary care provider on file. Discharging Nurse: [facility] Unit/Room#: 0325/0325-02  Discharging Unit Phone Number: ***    Emergency Contact:   Extended Emergency Contact Information  Primary Emergency Contact: Corey Ayaz Phone: [phone]  Relation: Other  Preferred language: [place]  Secondary Emergency Contact: [address] [place] [address] Phone: [phone]  Relation: Child  Preferred language: English    Past Surgical History:  Past Surgical History:   Procedure Laterality Date    FEMUR SURGERY Right 2021    RIGHT FEMUR INTRAMEDULLARY NAIL INSERTION (Right )    JOINT REPLACEMENT         Immunization History: There is no immunization history on file for this patient.     Active Problems:  Patient Active Problem List   Diagnosis Code    Trauma T14.90XA    Fracture of right femur ([address] S72.91XA    Blood alcohol level of 120-199 mg/100 ml Y90.6    Alcohol intoxication ([address] F10.929    Anemia D64.9    Fracture of multiple ribs of left side S22.42XA    Fracture of eleventh thoracic vertebra ([address] S22.089A    Fracture of first lumbar vertebra ([address] S32.019A    Acute postoperative anemia due to expected blood loss D62    Hyponatremia E87.1    Hypocalcemia E83.51    Recent unexplained weight loss R63.4    Left foot drop M21.372    Multiple fractures of pelvis with stable disruption of pelvic ring, initial encounter for closed fracture ([facility] S32.810A    Osteoporosis M81.0    Vitamin D deficiency E55.9    Tobacco use Z72.0       Isolation/Infection:   Isolation            No Isolation          Patient Infection Status       None to display            Nurse Assessment:  Last Vital Signs: /69   Pulse 102   Temp 97.7 °F (36.5 °C)   Resp 16   Ht 5' 4\" (1.626 m)   Wt 113 lb (51.3 kg)   SpO2 99%   BMI 19.40 kg/m²     Last documented pain score (0-10 scale): Pain Level: 6  Last Weight:   Wt Readings from Last 1 Encounters:   21 113 lb (51.3 kg)     Mental Status:  {IP PT MENTAL STATUS:}    IV Access:  { JL IV ACCESS:458812011}    Nursing Mobility/ADLs:  Walking   {[facility] DME EBQK:650213546}  Transfer  {[facility] DME LZGC:794597516}  Bathing  {[facility] DME OPR}  Dressing  {[facility] DME RZLU:279706376}  Toileting  {[facility] DME NPTL:473146730}  Feeding  {[facility] DME SJSY:369672648}  Med Admin  {[facility] DME UJYX:551654402}  Med Delivery   {[facility] MED Delivery:373064242}    Wound Care Documentation and Therapy:        Elimination:  Continence:   · Bowel: {YES / RU:20370}  · Bladder: {YES / RQ:60441}  Urinary Catheter: {Urinary Catheter:338927972}   Colostomy/Ileostomy/Ileal Conduit: {YES / JY:36959}       Date of Last BM: ***    Intake/Output Summary (Last 24 hours) at 2021 1201  Last data filed at 2021 0640  Gross per 24 hour   Intake 5382.5 ml   Output 500 ml   Net 4882.5 ml     I/O last 3 completed shifts: In: 5382.5 [I.V.:1000;  Blood:4382.5]  Out: 500 [Blood:500]    Safety Concerns:     [address] [employer] Safety Concerns:278740408}    Impairments/Disabilities:      [address] [employer] Impairments/Disabilities:931792235}    Nutrition Therapy:  Current Nutrition Therapy:   [address] [employer] Diet List:417397567}    Routes of Feeding: {[facility]P DME Other Feedings:325119623}  Liquids: {Slp liquid thickness:67712}  Daily Fluid Restriction: {CHP DME Yes amt example:178201395}  Last Modified Barium Swallow with Video (Video Swallowing Test): {Done Not Done EUOJ:765867272}    Treatments at the Time of Hospital Discharge:   Respiratory Treatments: ***  Oxygen Therapy:  {Therapy; copd oxygen:26956}  Ventilator:    {[facility] CC Vent VOXI:108181222}    Rehab Therapies: {THERAPEUTIC INTERVENTION:6254678899}  Weight Bearing Status/Restrictions: [facility] CC Weight Bearin}  Other Medical Equipment (for information only, NOT a DME order):  {EQUIPMENT:513807940}  Other Treatments: ***    Patient's personal belongings (please select all that are sent with patient):  {CHP DME Belongings:034355080}    RN SIGNATURE:  {Esignature:339312875}    CASE MANAGEMENT/SOCIAL WORK SECTION    Inpatient Status Date: ***    Readmission Risk Assessment Score:  Readmission Risk              Risk of Unplanned Readmission:        13           Discharging to Facility/ Agency   Name:   [facility]  [address] [place] [address] Rajat Gale [address] [address]       Phone: [phone]       Fax: [phone]        ·   ·     Dialysis Facility (if applicable)   ·     / signature: Electronically signed by Ana Ayers RN on 21 at 2:05 PM EDT    PHYSICIAN SECTION    Prognosis: Good    Condition at Discharge: Stable    Rehab Potential (if transferring to Rehab): {Prognosis:6879109557}    Recommended Labs or Other Treatments After Discharge: ***    Physician Certification: I certify the above information and transfer of Joseph Desai  is necessary for the continuing treatment of the diagnosis listed and that she requires [address] for less 30 days.      Update Admission H&P: No change in H&P    PHYSICIAN SIGNATURE:  Electronically signed by MARGA Quevedo CNP on 21 at 3:31 PM EDT

## 2021-04-23 NOTE — PROGRESS NOTES
Occupational Therapy  Facility/Department: [facility] RENAL//MED SURG  Daily Treatment Note  NAME: Renée Vee  : 1955  MRN: [id number]    Date of Service: 2021    Discharge Recommendations:  Patient would benefit from continued therapy after discharge       Assessment   Performance deficits / Impairments: Decreased functional mobility ; Decreased ADL status; Decreased high-level IADLs;Decreased safe awareness;Decreased endurance  Treatment Diagnosis: Fracture of right femur ([facility]  Prognosis: Good  Patient Education: Educated on OT role, transfer/walker safety, weight bearing status-good return  REQUIRES OT FOLLOW UP: Yes  Activity Tolerance  Activity Tolerance: Patient limited by pain  Safety Devices  Safety Devices in place: Yes  Type of devices: Left in bed;Gait belt;Call light within reach;Nurse notified  Restraints  Initially in place: No         Patient Diagnosis(es): The encounter diagnosis was Closed fracture of right femur, unspecified fracture morphology, unspecified portion of femur, initial encounter ([facility] has a past medical history of Falls, Osteoporosis, and Vitamin D deficiency. has a past surgical history that includes joint replacement; Femur Surgery (Right, 2021); and Tibia fracture surgery (Right, 2021).     Restrictions  Restrictions/Precautions  Restrictions/Precautions: Weight Bearing  Required Braces or Orthoses?: No  Lower Extremity Weight Bearing Restrictions  Right Lower Extremity Weight Bearing: Weight Bearing As Tolerated  Position Activity Restriction  Other position/activity restrictions:  Open treatment of right intra-articular supracondylar femur fracture  Subjective   General  Patient assessed for rehabilitation services?: Yes  Family / Caregiver Present: No  Diagnosis: Fracture of right femur  General Comment  Pain Assessment  Pain Level: 8  Pain Type: Acute pain;Surgical pain  Pain Location: Hip  Pain Orientation: Right  Pain Descriptors: Aching;Discomfort; Sore  Pain Frequency: Continuous  Non-Pharmaceutical Pain Intervention(s): Repositioned;Rest;Therapeutic presence  Vital Signs  Patient Currently in Pain: Yes   Orientation  Orientation  Overall Orientation Status: Within Functional Limits  Objective    ADL  Additional Comments: Pt stated completed ADLs earlier this day with rajeev. Balance  Sitting Balance: Stand by assistance(~5 minutes EOB)  Standing Balance: Contact guard assistance  Standing Balance  Time: ~3 minutes  Activity: 4 steps forward and backwards, static standing at EOB. Pt c/o 8/10 pain in hip with standing activity. Comment: Using RW  Functional Mobility  Functional - Mobility Device: Rolling Walker  Activity: Other(4 steps forward/backwards)  Assist Level: Contact guard assistance  Bed mobility  Supine to Sit: Stand by assistance  Sit to Supine: Minimal assistance  Comment: Pt req assistance for sit to supine BLE progression  Cognition  Overall Cognitive Status: [facility]  Plan   Plan  Times per week: 2-3x/wk    Goals  Short term goals  Time Frame for Short term goals: By discharge, pt will:  Short term goal 1: Complete LB ADL's and toileting with min assist and A/E PRN. Short term goal 2: Complete functonal transfers/ functional mobility with CGA and LRD. Short term goal 3: Tolerate ~8 min of static/dynamic standing with CGA to support performace of standing occupations. Short term goal 4: Demo good safety awareness during functional mobility/functional transfers with <2 VC. Therapy Time   Individual Concurrent Group Co-treatment   Time In 1420         Time Out 1436         Minutes 16         Timed Code Treatment Minutes: 16 Minutes     Pt supine in bed upon therapist arrival. Pt agreeable and pleasant for therapy. See above LOF for all tasks. Pt retired supine in bed at the end of the session with call light within reach.     [address] [place] [address],[place] [address] GINETTE/L

## 2021-04-23 NOTE — PROGRESS NOTES
PROGRESS NOTE      PATIENT NAME: Jsoeph Desai  MEDICAL RECORD NO. [id number]  DATE: 2021  PRIMARY CARE PHYSICIAN: No primary care provider on file. HD: # 2    ASSESSMENT    Patient Active Problem List   Diagnosis    Trauma    Fracture of right femur ([facility] [address]    Blood alcohol level of 120-199 mg/100 ml    Alcohol intoxication ([facility] [address]    Anemia    Fracture of multiple ribs of left side    Fracture of eleventh thoracic vertebra ([facility] [address]    Fracture of first lumbar vertebra ([facility])    Acute postoperative anemia due to expected blood loss    Hyponatremia    Hypocalcemia    Recent unexplained weight loss    Left foot drop    Multiple fractures of pelvis with stable disruption of pelvic ring, initial encounter for closed fracture ([facility] [address]    Osteoporosis    Vitamin D deficiency    Tobacco use       MEDICAL DECISION MAKING AND PLAN    Fall SH to knees, + ETOH    R femur fx   - Ortho consult   - POD2 R femur intramedullary nail insertion    - WBAT to RLE  T11 and L1 acute compression fx   - Neurosurgery consulted - fx appear old   - MRI lumbar and sacrum multilevel degenerative chnages  L rib fx 8 through 12, subacute   - Encourage IS  Anemic Hb 6.7   - 2 units PRBC    - Post transfusion Hgb 8.2  CIWA  Elevated WBC, will order UA  Pain MMT  PT/OT  Carb controlled diet  Lovenox     Dispo planning pending PT/OT assessment      SUBJECTIVE    Joseph Desai was seen and examined at bedside. No acute events overnight. Tolerating diet. No N/V, passing flatus. Voiding.       OBJECTIVE  VITALS: Temp: Temp: 98.5 °F (36.9 °C)Temp  Av.3 °F (36.8 °C)  Min: 97.9 °F (36.6 °C)  Max: 98.6 °F (37 °C) BP Systolic (84GYM), UZN:813 , Min:94 , LTE:980   Diastolic (57QAM), KIB:39, Min:57, Max:69   Pulse Pulse  Av.8  Min: 102  Max: 104 Resp Resp  Av  Min: 16  Max: 16 Pulse ox SpO2  Av.5 %  Min: 87 %  Max: 97 %  General appearance: alert, appears stated age and cooperative, sleeping comfortably in bed, wakes easily  Head: Normocephalic, without obvious abnormality, atraumatic  Eyes: conjunctivae/corneas clear. Neck: supple, symmetrical, trachea midline  Back: symmetric, no curvature. ROM normal. No CVA tenderness. Lungs: clear to auscultation bilaterally  Heart: regular rate and rhythm, S1, S2 normal, no murmur, click, rub or gallop  Abdomen: soft, non-tender; bowel sounds normal; no masses,  no organomegaly  Extremities:  Right lower extremity is in a splint, dressings not removed for this exam, 2+ DP pulses bilaterally, patient is able to wiggle her toes and has 5/5 dorsi and plantar flexion bilaterally, sensation intact, good cap refill  Neurologic: Grossly normal    No intake/output data recorded. Drain/tube output:  No intake/output data recorded. LAB:  CBC:   Recent Labs     04/21/21  0605 04/21/21  0605 04/21/21  2153 04/22/21  0816 04/23/21  0611   WBC 8.3  --  20.3*  --  20.7*   HGB 8.5*   < > 6.7* 8.2* 8.2*   HCT 27.9*   < > 22.1* 27.1* 24.5*   MCV 79.3*  --  82.8  --  78.3*     --  174  --  161    < > = values in this interval not displayed. BMP:   Recent Labs     04/21/21  0808 04/22/21  1613 04/23/21  0611   * 126* 125*   K 3.6* 4.3 4.2   CL 95* 98 98   CO2 20 19* 19*   BUN 2* 4* 3*   CREATININE 0.23* 0.31* 0.25*   GLUCOSE 70 117* 103*     COAGS:   Recent Labs     04/21/21  0000 04/23/21  0611   APTT 25.2  --    PROT  --  5.5*   INR 1.2  --        RADIOLOGY:  Xr Chest (single View Frontal)    Result Date: 4/22/2021  EXAMINATION: ONE XRAY VIEW OF THE CHEST 4/21/2021 1:54 am COMPARISON: None HISTORY: ORDERING SYSTEM PROVIDED HISTORY:  Pre-op TECHNOLOGIST PROVIDED HISTORY:  Pre-op Reason for Exam:  Pre-op supine port FINDINGS: No lines or tubes. Normal cardiomediastinal silhouette. The lungs are clear without focal consolidation or pleural effusion. No suspicious pulmonary nodules. No pulmonary edema. No pneumothorax. No acute osseous abnormality.   Status post ORIF of the left proximal humerus. No acute cardiopulmonary disease. Xr Femur Right (min 2 Views)    Result Date: 4/21/2021  EXAMINATION: 2 XRAY VIEWS OF THE RIGHT FEMUR 4/21/2021 4:45 pm COMPARISON: Radiographs performed earlier the same day HISTORY: ORDERING SYSTEM PROVIDED HISTORY: Post op PACU please. Thank you. TECHNOLOGIST PROVIDED HISTORY: Post op PACU please. Thank you. Acuity: Acute Type of Exam: Ongoing FINDINGS: There has been interval intramedullary william and screw fixation of the comminuted distal femoral fracture with improved alignment of the fracture fragments. There is edema and emphysema in the overlying soft tissues, consistent with recent surgery. There is diffuse bone demineralization. Degenerative changes are noted in the right hip and knee. Postoperative changes from internal fixation of the comminuted distal femoral fracture. Xr Femur Right (min 2 Views)    Result Date: 4/21/2021  EXAMINATION: THREE XRAY VIEWS OF THE RIGHT KNEE; 2 XRAY VIEWS OF THE RIGHT FEMUR 4/21/2021 3:53 am COMPARISON: None. HISTORY: ORDERING SYSTEM PROVIDED HISTORY: Right knee post traction TECHNOLOGIST PROVIDED HISTORY: Right knee post traction Reason for Exam: post traction FINDINGS: Following placement of a proximal tibial diaphyseal traction pin, anterior displacement of the distal portion has been reduced from 1 shaft with to 1/2 shaft with and overriding is no longer present. There has been no change in anterior angulation of approximately 25 degrees. The nondisplaced and possibly incomplete sagittal plane fracture through the distal portion extending to the lateral aspect of the intercondylar notch is again noted. The moderate heme arthrosis is again noted. On the cross-table lateral view, a tiny fat level is noted. Marginal reduction of displacement and overriding with no change in anterior angulation.      Xr Femur Right (min 2 Views)    Result Date: 4/21/2021  EXAMINATION: 2 XRAY VIEWS OF THE RIGHT FEMUR 4/21/2021 12:58 am COMPARISON: None. HISTORY: ORDERING SYSTEM PROVIDED HISTORY: fall, deformity TECHNOLOGIST PROVIDED HISTORY: fall, deformity Reason for Exam: Fall distal femur deformity Acuity: Acute Type of Exam: Initial FINDINGS: There is an oblique fracture of the distal right femoral diaphysis with extension into the metaphysis. Within the distal portion, there is also nondisplaced sagittal plane fracture extending to the lateral aspect of the intercondylar notch. There is associated moderate joint effusion/hemarthrosis. 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 Soft tissues are noted for atherosclerotic calcification. Distal femur fracture as described with associated moderate knee joint effusion/hemarthrosis. Osteoporosis. Xr Knee Right (3 Views)    Result Date: 4/21/2021  EXAMINATION: THREE XRAY VIEWS OF THE RIGHT KNEE; 2 XRAY VIEWS OF THE RIGHT FEMUR 4/21/2021 3:53 am COMPARISON: None. HISTORY: ORDERING SYSTEM PROVIDED HISTORY: Right knee post traction TECHNOLOGIST PROVIDED HISTORY: Right knee post traction Reason for Exam: post traction FINDINGS: Following placement of a proximal tibial diaphyseal traction pin, anterior displacement of the distal portion has been reduced from 1 shaft with to 1/2 shaft with and overriding is no longer present. There has been no change in anterior angulation of approximately 25 degrees. The nondisplaced and possibly incomplete sagittal plane fracture through the distal portion extending to the lateral aspect of the intercondylar notch is again noted. The moderate heme arthrosis is again noted. On the cross-table lateral view, a tiny fat level is noted. Marginal reduction of displacement and overriding with no change in anterior angulation. Xr Knee Right (3 Views)    Result Date: 4/21/2021  EXAMINATION: THREE XRAY VIEWS OF THE RIGHT KNEE 4/21/2021 12:58 am COMPARISON: None.  HISTORY: ORDERING SYSTEM PROVIDED HISTORY: femur deformity TECHNOLOGIST PROVIDED HISTORY: femur deformity Reason for Exam: Fall distal femur deformity Acuity: Acute Type of Exam: Initial FINDINGS: There is an oblique fracture of the distal femoral diaphysis extending into the metaphysis with approximately 1 shaft width anterior displacement of the distal portion and mild overriding. Bone density is decreased with probable old small bone infarct in the proximal tibial metaphysis. Sagittal plane fracture line extends through the midline of the distal portion to the lateral aspect of the intercondylar notch. Soft tissues are noted for atherosclerotic calcification and moderate knee joint effusion. Comminuted distal right femur fracture as described with intra-articular extension. Osteoporosis. Moderate knee joint effusion/hemarthrosis. Xr Tibia Fibula Right (2 Views)    Result Date: 4/21/2021  EXAMINATION: XRAY VIEWS OF THE RIGHT TIBIA AND FIBULA 4/21/2021 1:14 am COMPARISON: None. HISTORY: ORDERING SYSTEM PROVIDED HISTORY: Trauma/Fracture TECHNOLOGIST PROVIDED HISTORY: Trauma/Fracture Reason for Exam: fall pain to rt knee FINDINGS: Distal femur fracture. Please refer to previous reports. The tibia and fibula are noted for old healed oblique fracture of the distal tibial metadiaphyseal region. Bone density is decreased with the exception of probable old bone infarct at the proximal tibial metaphysis. Distal femur fracture. Please refer to previous reports. Osteoporosis. Old healed distal tibial fracture. 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 Other possibilities include enchondroma or less likely low-grade chondrosarcoma. Correlation with old studies would be most useful. An addendum can be made should they become available.      Ct Head Wo Contrast    Result Date: 4/21/2021  EXAMINATION: CT OF THE HEAD WITHOUT CONTRAST  4/21/2021 2:38 am TECHNIQUE: CT of the head was for age with probable bony foraminal stenosis on the right at C4-5 and bilaterally at C5-6. SOFT TISSUES: There is no prevertebral soft tissue swelling. No acute abnormality of the cervical spine. Ct Knee Right Wo Contrast    Result Date: 4/21/2021  EXAMINATION: CT OF THE RIGHT KNEE WITHOUT CONTRAST 4/21/2021 2:39 am TECHNIQUE: CT of the right knee was performed without the administration of intravenous contrast.  Multiplanar reformatted images are provided for review. Dose modulation, iterative reconstruction, and/or weight based adjustment of the mA/kV was utilized to reduce the radiation dose to as low as reasonably achievable. COMPARISON: None. HISTORY ORDERING SYSTEM PROVIDED HISTORY: Right distal femur fracture with intra-articular extension TECHNOLOGIST PROVIDED HISTORY: Right distal femur fracture with intra-articular extension Reason for Exam: Right distal femur fracture with intra-articular extension Acuity: Acute Type of Exam: Initial FINDINGS: Bones: Again noted is the oblique fracture of the distal right femoral diaphysis. There is approximately 25 degrees anterior angulation of the distal portion and approximately 2.5 cm overriding. As noted on the plain film, there is a sagittal plane possibly incomplete fracture extending through the distal portion into the lateral aspect of the intercondylar notch. Bone density is diffusely decreased. Centrally within proximal tibial metaphysis, there is a sclerotic lesion consistent with old bone infarct or enchondroma. Soft Tissue:  No significant hematoma. Joint:  Small to moderate heme arthrosis. Minor degenerative change. Distal right femoral fracture with nondisplaced and possibly incomplete sagittal plane fracture in the distal portion extending to the lateral aspect of the intercondylar notch. Small to moderate hemarthrosis. Sclerotic lesion centrally within the tibial metaphysis most likely representing old bone infarct.   Enchondroma is also a consideration. Recommend obtaining old comparison studies to document expected stability. Low-grade chondrosarcoma cannot be entirely excluded but is considered unlikely. Fluoro For Surgical Procedures    Result Date: 4/21/2021  Radiology exam is complete. No Radiologist dictation. Please follow up with ordering provider. Ct Chest Abdomen Pelvis W Contrast    Result Date: 4/21/2021  EXAMINATION: CT OF THE CHEST, ABDOMEN, AND PELVIS WITH CONTRAST 4/21/2021 2:38 am TECHNIQUE: CT of the chest, abdomen and pelvis was performed with the administration of intravenous contrast. Multiplanar reformatted images are provided for review. Dose modulation, iterative reconstruction, and/or weight based adjustment of the mA/kV was utilized to reduce the radiation dose to as low as reasonably achievable. COMPARISON: None HISTORY: ORDERING SYSTEM PROVIDED HISTORY: trauma TECHNOLOGIST PROVIDED HISTORY: trauma Reason for Exam: trauma Acuity: Acute Type of Exam: Initial FINDINGS: Chest: Mediastinum:  No evidence of mediastinal hematoma or pneumomediastinum. No acute traumatic injury to the heart or pericardium. The esophagus is fluid-filled and mildly ectatic. Lungs/pleura: Minor bibasilar atelectasis. The lungs are otherwise clear. No pneumothorax or pleural effusion. Soft Tissues/Bones: Multiple posterior and posterolateral left rib fractures are present involving ribs 8-12. Ribs 10, 11 and 12 are fractured in 2 locations. Bone density is diffusely osteoporotic. There are multiple old thoracic compression deformities with possible acute on chronic mild to moderate compression deformity at T11. Abdomen/Pelvis: Organs:  Liver enhances normally without evidence of intrahepatic biliary ductal dilatation. Cholelithiasis without evidence of cholecystitis. The spleen, pancreas and adrenal glands are unremarkable. The kidneys and ureters are normal. GI/Bowel: Stomach and duodenal sweep demonstrate no acute abnormality.  There is no evidence of bowel obstruction. No evidence of abnormal bowel wall thickening or distension. No evidence of appendicitis. Pelvis: The bladder is unremarkable. The patient is status post hysterectomy. Peritoneum/Retroperitoneum: No evidence of free air. No evidence of intraperitoneal/retroperitoneal hemorrhage or fluid. Bones/Soft Tissues: There may be acute mild anterior wedge compression deformity at L1 involving the inferior endplate. Bone density is decreased. The patient is status post posterior instrumented fusion with pedicle screws and rods from L4-S1. Acute nondisplaced left rib fractures posteriorly and laterally involving ribs 8 through 12. Ribs 10, 11 and 12 are fractured in 2 locations. There is no evidence overlying soft tissue injury and therefore does not meet graded criteria for chest wall injury. Possible acute compression deformities of mild degree at T11 and L1. Multiple old healed bilateral rib fractures more so on the left. And multiple old mild anterior wedge compression deformities. Ct Lumbar Spine Trauma Reconstruction    Result Date: 4/21/2021  EXAMINATION: CT OF THE THORACIC SPINE WITHOUT CONTRAST; CT OF THE LUMBAR SPINE WITHOUT CONTRAST  4/21/2021 2:39 am: TECHNIQUE: CT of the thoracic spine was performed without the administration of intravenous contrast. Multiplanar reformatted images are provided for review. Dose modulation, iterative reconstruction, and/or weight based adjustment of the mA/kV was utilized to reduce the radiation dose to as low as reasonably achievable.; CT of the lumbar spine was performed without the administration of intravenous contrast. Multiplanar reformatted images are provided for review. Dose modulation, iterative reconstruction, and/or weight based adjustment of the mA/kV was utilized to reduce the radiation dose to as low as reasonably achievable. COMPARISON: None.  HISTORY: ORDERING SYSTEM PROVIDED HISTORY: trauma TECHNOLOGIST PROVIDED achievable.; CT of the lumbar spine was performed without the administration of intravenous contrast. Multiplanar reformatted images are provided for review. Dose modulation, iterative reconstruction, and/or weight based adjustment of the mA/kV was utilized to reduce the radiation dose to as low as reasonably achievable. COMPARISON: None. HISTORY: ORDERING SYSTEM PROVIDED HISTORY: trauma TECHNOLOGIST PROVIDED HISTORY: trauma Reason for Exam: trauma Acuity: Acute Type of Exam: Initial; ORDERING SYSTEM PROVIDED HISTORY: TRAUMA TECHNOLOGIST PROVIDED HISTORY: trauma Reason for Exam: trauma Acuity: Acute Type of Exam: Initial FINDINGS: BONES/ALIGNMENT: There is normal alignment of the spine. L4 through S1 posterior lumbar interbody fusion is noted with the bilateral L4 cannulated pedicle screws appearing to extend beyond the superior endplate cortex. T3, T6, T7, T8, T9, T11, L1, L3, L4 and L5 vertebral body chronic appearing vertebral body compression fractures are noted with height loss greatest at the T11 level measuring approximately 50%. . Lumbar and sacral diffuse mixed lytic sclerotic appearance is seen. DEGENERATIVE CHANGES: No gross spinal canal stenosis or bony neural foraminal narrowing of the thoracic spine. SOFT TISSUES: No paraspinal mass is seen. 1. Multilevel thoracolumbar vertebral body remote appearing compression fractures, as discussed above. 2. For clinical suspicion of superimposed acute vertebral compression, recommend MRI thoracic and lumbar spine examinations for further evaluation. 3. L4 through S1 posterior lumbar interbody fusion with L4 vertebral body screws appearing to expand beyond the superior endplate cortex. 4. 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 Cannot exclude association with metastatic disease. Recommend clinical correlation.      Xr Hip 2-3 Vw W Pelvis Right    Result Date: 4/21/2021  EXAMINATION: ONE XRAY VIEW OF THE PELVIS AND TWO XRAY VIEWS RIGHT HIP 4/21/2021 12:58 am COMPARISON: None. HISTORY: ORDERING SYSTEM PROVIDED HISTORY: fall, deformity TECHNOLOGIST PROVIDED HISTORY: fall, deformity Reason for Exam: Fall distal femur deformity Acuity: Acute Type of Exam: Initial FINDINGS: There is no dislocation or acute fracture evident. Old healed fractures of the left pubic rami and acetabulum are noted. The patient is status post total left hip arthroplasty. There has been prior lower lumbar fusion. Bone density is decreased. Minor degenerative changes present at the right hip. Surrounding soft tissues are unremarkable. No acute abnormality evident. Posttraumatic changes on the left. Osteoporosis. Postop changes of the lumbar fusion and total left hip arthroplasty. Cleve Fierro DO  4/22/21, 11:19 AM         Attending Note      I have reviewed the above [facility] note(s) and I either performed the key elements of the medical history and physical exam or was present with the trauma resident when the key elements of the medical history and physical exam were performed. I have discussed the findings, established the care plan and recommendations with the trauma team.  Doing well except for thigh pain. Denies withdrawal symptoms, tolerating diet. WBC remains elevated. Will check UA, possible CXR if persists.     Asmita Schulz MD  4/23/2021  2:54 PM

## 2021-04-23 NOTE — PROGRESS NOTES
Jens Hughes was evaluated today and a DME order was entered for a wheeled walker because she requires this to successfully complete daily living tasks of ambulating. A wheeled walker is necessary due to the patient's unsteady gait, upper body weakness, and inability to  an ambulation device; and she can ambulate only by pushing a walker instead of a lesser assistive device such as a cane, crutch, or standard walker. The need for this equipment was discussed with the patient and she understands and is in agreement.

## 2021-04-23 NOTE — PROGRESS NOTES
Physical Therapy  Facility/Department: [facility] RENAL//MED SURG  Daily Treatment Note  NAME: Jena Wilson  : 1955  MRN: [id number]    Date of Service: 2021    Discharge Recommendations:  Patient would benefit from continued therapy after discharge   PT Equipment Recommendations  Equipment Needed: Yes  Mobility Devices: Victorine Sun: Rolling    Assessment   Body structures, Functions, Activity limitations: Decreased functional mobility ; Decreased endurance;Decreased strength; Increased pain  Assessment: pt. ambulated 20ft with RW and CGA; pt. slightly unsteady with transfers and ambulation d/t fatigue and pain. pt. could benefit from continued PT for gait training and strengthening to increase functional mobility following DC  Prognosis: Good  Decision Making: Medium Complexity  PT Education: Goals;PT Role;Plan of Care  REQUIRES PT FOLLOW UP: Yes  Activity Tolerance  Activity Tolerance: Patient limited by fatigue;Patient limited by endurance; Patient limited by pain     Patient Diagnosis(es): The encounter diagnosis was Closed fracture of right femur, unspecified fracture morphology, unspecified portion of femur, initial encounter ([facility] [address] has a past medical history of Falls, Osteoporosis, and Vitamin D deficiency. has a past surgical history that includes joint replacement; Femur Surgery (Right, 2021); and Tibia fracture surgery (Right, 2021). Restrictions  Restrictions/Precautions  Restrictions/Precautions: Weight Bearing  Required Braces or Orthoses?: No  Lower Extremity Weight Bearing Restrictions  Right Lower Extremity Weight Bearing: Weight Bearing As Tolerated  Position Activity Restriction  Other position/activity restrictions:  Open treatment of right intra-articular supracondylar femur fracture  Subjective   General  Chart Reviewed: Yes  Response To Previous Treatment: Patient with no complaints from previous session.   Family / Caregiver Present: No  Subjective  Subjective: RN and pt. agreeable to PT treatment. pt. supine in bed at arrivial; pt. required encouragement initally to partcipate  Pain Screening  Patient Currently in Pain: Yes  Pain Assessment  Pain Assessment: 0-10  Pain Level: 5  Pain Type: Acute pain;Surgical pain  Pain Location: Leg  Pain Orientation: Right  Pain Descriptors: Aching;Discomfort  Non-Pharmaceutical Pain Intervention(s): Ambulation/Increased Activity;Repositioned  Response to Pain Intervention: Patient Satisfied  Vital Signs  Patient Currently in Pain: Yes       Orientation  Orientation  Overall Orientation Status: Within Normal Limits  Cognition      Objective   Bed mobility  Supine to Sit: Minimal assistance  Sit to Supine: Minimal assistance  Scooting: Minimal assistance  Comment: pt. require Min A for B LE progression to EOB  Transfers  Sit to Stand: Contact guard assistance  Stand to sit: Contact guard assistance;Minimal Assistance  Comment: pt. requires CGA to min A with stand<>sit transfer for control to lower back to bed  Ambulation  Ambulation?: Yes  Ambulation 1  Surface: level tile  Device: Rolling Walker  Other Apparatus: (IV pole)  Assistance: Contact guard assistance  Gait Deviations: Slow Cristal;Decreased step height;Decreased step length  Distance: approx 20ft  Comments: pt. ambulated 20ft with RW and CGA; pt. slightly unsteady intially, but with further ambulation improved- no LOB; further amb declined by patient at this date d/t fatigue from working with OT      Balance  Posture: Good  Sitting - Static: Good  Sitting - Dynamic: Fair  Standing - Static: Fair  Standing - Dynamic: Fair  Comments: standing balance assessed with RW  Exercises  Hip Flexion: B LE x 10 reps; AAROM R LE  Knee Long Arc Quad: B LE x 10 reps  Ankle Pumps:  B LE x 15 reps  Comments: pt. performed exercise EOB     Goals  Short term goals  Time Frame for Short term goals: 10 visits  Short term goal 1: transfers with SBA  Short term goal 2: pt to ambulate 150 ft with a RW x SBA with WBAT R LE  Short term goal 3: ascend/descend 4 steps with SBA  Short term goal 4: 20 min exercise program x SBA  Patient Goals   Patient goals : Return home    Plan    Plan  Times per week: 5-6x wk  Current Treatment Recommendations: Strengthening, Functional Mobility Training, Gait Training, Safety Education & Training, Endurance Training, Stair training, Pain Management  Safety Devices  Type of devices: Nurse notified, Gait belt, Call light within reach, All fall risk precautions in place, Left in bed     Therapy Time   Individual Concurrent Group Co-treatment   Time In 1455         Time Out 1510         Minutes 15         Timed Code Treatment Minutes: [address] performed by Student PTA under the supervision of co-signing PTA who agrees with all treatment and documentation.    Roberto Garza, PTA

## 2021-04-23 NOTE — PROGRESS NOTES
switch her laundry.  Patient has a femur deformity with shortening of the right leg.  Did not hit her head or lose conscious.  Patient does drink daily.  Pain well controlled at this time.  Patient was given 50 mcg of fentanyl by EMS.  Patient does have good pulses and sensation distal to the injury. \"      And reports that she was in a hurry to get down to the laundromat before her cycle finished and her close were removed by other people  She tripped over a curb and sustained multiple traumatic injuries including:  Right femur fracture  T11, L1 compression fractures  Left Rib fractures 8, 9, 10, 11, 12     They are now postop:  RIGHT FEMUR INTRAMEDULLARY NAIL   Postop x-ray:  Impression:  Postoperative changes from internal fixation of the comminuted distal femoral   fracture.      Postop hemoglobin had dropped to 6.7  Blood transfusion has been ordered     Sodium 126  Potassium 3.6  Calcium 7.8     Blood alcohol level was 0.187    Drug screen:  Oxycodone Screen, UrPOSITIVEAbnormal   Cocaine Metabolite, UrinePOSITIVEAbnormal    Initial recommendations included:  Trauma evaluation in progress  Ortho evaluation in progress  Detox  Ativan  Thiamine okay  Transfuse as needed  Smoking cessation / education   Pain management  Correct electrolyte abnormalities   DVT prophylaxis - on Lovenox  Complete work-up on outpatient basis for unexplained weight loss  (Colonoscopy planned later this month)         Medications: Allergies:     Allergies   Allergen Reactions    Sulfa Antibiotics        Current Meds:   Scheduled Meds:    enoxaparin  30 mg Subcutaneous BID    sodium chloride flush  5-40 mL Intravenous 2 times per day    sodium chloride flush  5-40 mL Intravenous 2 times per day    acetaminophen  1,000 mg Oral 3 times per day    methocarbamol  750 mg Oral TID    gabapentin  300 mg Oral TID    polyethylene glycol  17 g Oral Daily    sennosides-docusate sodium  1 tablet Oral BID    thiamine (VITAMIN B1) IVPB 500 mg Intravenous Q8H     Continuous Infusions:   PRN Meds: oxyCODONE, sodium chloride flush, bisacodyl, ondansetron    Data:     Past Medical History:   has a past medical history of Falls, Osteoporosis, and Vitamin D deficiency. Social History:   reports that she has been smoking. She does not have any smokeless tobacco history on file. She reports current alcohol use. She reports that she does not use drugs. Family History:   Family History   Problem Relation Age of Onset    Cancer Mother         Patient believes the primary site was ovarian    Prostate Cancer Father        Review of Systems:     Review of Systems   Constitutional: Positive for activity change (Reduced) and unexpected weight change (Reports a 20 pound weight loss). Respiratory: Negative for cough and shortness of breath. Cardiovascular: Positive for chest pain (Rib pain controlled). Negative for palpitations. Gastrointestinal: Negative for abdominal pain, nausea and vomiting. Genitourinary: Negative for flank pain and hematuria. Musculoskeletal: Positive for arthralgias, back pain, gait problem (She did ambulate with a walker) and myalgias. The patient reports her hip is still stiff and sore  Her back pain is well controlled       Physical Examination:        Physical Exam  Vitals signs reviewed. Constitutional:       General: She is not in acute distress. Appearance: She is not diaphoretic. HENT:      Head: Normocephalic. Nose: Nose normal.   Eyes:      General: No scleral icterus. Conjunctiva/sclera: Conjunctivae normal.   Neck:      Musculoskeletal: Neck supple. Trachea: No tracheal deviation. Cardiovascular:      Rate and Rhythm: Normal rate and regular rhythm. Pulmonary:      Effort: Pulmonary effort is normal. No respiratory distress. Breath sounds: Normal breath sounds. No wheezing or rales. Chest:      Chest wall: No tenderness.    Abdominal:      General: Bowel sounds are normal. There is no distension. Palpations: Abdomen is soft. Tenderness: There is no abdominal tenderness. Musculoskeletal:         General: Deformity (Contracture left hand noted) present. No tenderness. Skin:     General: Skin is warm and dry. Neurological:      Mental Status: She is alert and oriented to person, place, and time. Vitals:  BP (!) 94/57   Pulse 102   Temp 98.5 °F (36.9 °C) (Oral)   Resp 16   Ht 5' 4\" (1.626 m)   Wt 116 lb 4.8 oz (52.8 kg)   SpO2 (!) 87%   BMI 19.96 kg/m²   Temp (24hrs), Av.3 °F (36.8 °C), Min:97.9 °F (36.6 °C), Max:98.6 °F (37 °C)    No results for input(s): POCGLU in the last 72 hours. I/O (24Hr): No intake or output data in the 24 hours ending 21 1530    Labs:  Hematology:  Recent Labs     21  0000 21  0605 21  0605 21  2153 21  0816 21  0611   WBC 9.1 8.3  --  20.3*  --  20.7*   RBC 2.95* 3.52*  --  2.67*  --  3.13*   HGB 6.7* 8.5*   < > 6.7* 8.2* 8.2*   HCT 21.6* 27.9*   < > 22.1* 27.1* 24.5*   MCV 73.2* 79.3*  --  82.8  --  78.3*   MCH 22.7* 24.1*  --  25.1*  --  26.2   MCHC 31.0 30.5  --  30.3  --  33.5   RDW 25.1* 23.7*  --  22.0*  --  19.4*    258  --  174  --  161   MPV 8.4 8.8  --  8.9  --  8.8   INR 1.2  --   --   --   --   --     < > = values in this interval not displayed.      Chemistry:  Recent Labs     21  0808 21  1002 21  1613 21  0611   *  --  126* 125*   K 3.6*  --  4.3 4.2   CL 95*  --  98 98   CO2 20  --  19* 19*   GLUCOSE 70  --  117* 103*   BUN 2*  --  4* 3*   CREATININE 0.23*  --  0.31* 0.25*   ANIONGAP 11  --  9 8*   LABGLOM >60  --  >60 >60   GFRAA >60  --  >60 >60   CALCIUM 8.0*  --  7.3* 7.5*   CAION  --   --   --  1.16   TROPHS  --  7  --   --      Recent Labs     21  0611   PROT 5.5*   LABALBU 2.5*   AST 71*   ALT 23   ALKPHOS 152*   BILITOT 0.79   BILIDIR 0.49*     ABG:  Lab Results   Component Value Date    FIO2 INFORMATION NOT PROVIDED 04/21/2021     No results found for: SPECIAL  No results found for: CULTURE    Radiology:  Xr Chest (single View Frontal)    Result Date: 4/22/2021  No acute cardiopulmonary disease. Xr Femur Right (min 2 Views)    Result Date: 4/21/2021  Postoperative changes from internal fixation of the comminuted distal femoral fracture. Xr Femur Right (min 2 Views)    Result Date: 4/21/2021  Marginal reduction of displacement and overriding with no change in anterior angulation. Xr Femur Right (min 2 Views)    Result Date: 4/21/2021  Distal femur fracture as described with associated moderate knee joint effusion/hemarthrosis. Osteoporosis. Xr Knee Right (3 Views)    Result Date: 4/21/2021  Marginal reduction of displacement and overriding with no change in anterior angulation. Xr Knee Right (3 Views)    Result Date: 4/21/2021  Comminuted distal right femur fracture as described with intra-articular extension. Osteoporosis. Moderate knee joint effusion/hemarthrosis. Xr Tibia Fibula Right (2 Views)    Result Date: 4/21/2021  Distal femur fracture. Please refer to previous reports. Osteoporosis. Old healed distal tibial fracture. 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 Other possibilities include enchondroma or less likely low-grade chondrosarcoma. Correlation with old studies would be most useful. An addendum can be made should they become available. Ct Head Wo Contrast    Result Date: 4/21/2021  No acute intracranial abnormality. Ct Cervical Spine Wo Contrast    Result Date: 4/21/2021  No acute abnormality of the cervical spine. Ct Knee Right Wo Contrast    Result Date: 4/21/2021  Distal right femoral fracture with nondisplaced and possibly incomplete sagittal plane fracture in the distal portion extending to the lateral aspect of the intercondylar notch. Small to moderate hemarthrosis.  Sclerotic lesion centrally within the tibial metaphysis clinical correlation. Ct Thoracic Spine Trauma Reconstruction    Result Date: 4/21/2021  1. Multilevel thoracolumbar vertebral body remote appearing compression fractures, as discussed above. 2. For clinical suspicion of superimposed acute vertebral compression, recommend MRI thoracic and lumbar spine examinations for further evaluation. 3. L4 through S1 posterior lumbar interbody fusion with L4 vertebral body screws appearing to expand beyond the superior endplate cortex. 4. 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 Cannot exclude association with metastatic disease. Recommend clinical correlation. Xr Hip 2-3 Vw W Pelvis Right    Result Date: 4/21/2021  No acute abnormality evident. Posttraumatic changes on the left. Osteoporosis. Postop changes of the lumbar fusion and total left hip arthroplasty. Assessment:        Active Problems:    Trauma    Fracture of right femur (HCC)    Blood alcohol level of 120-199 mg/100 ml    Alcohol intoxication ([address]    Anemia    Fracture of multiple ribs of left side    Fracture of eleventh thoracic vertebra ([address]    Fracture of first lumbar vertebra (HCC)    Acute postoperative anemia due to expected blood loss    Hyponatremia    Hypocalcemia    Recent unexplained weight loss    Tobacco use    Cocaine use  Resolved Problems:    * No resolved hospital problems.  *      Plan:        Recommendations:  Trauma evaluation in progress  Ortho evaluation in progress  Detox  Ativan  Thiamine   Alcohol abstinence  Cocaine abstinence  Rehab program suggested  (The patient declined saying she has \"already done that and she does not believe she has a problem at this time\")  Transfuse as needed  Smoking cessation / education   Pain management  Correct electrolyte abnormalities   DVT prophylaxis - on Lovenox  Complete work-up on outpatient basis for unexplained weight loss  (Colonoscopy planned later this month)

## 2021-04-23 NOTE — CARE COORDINATION
Transitional planning:  Met with Alexa Schulz RN, pt requesting HC.     1315 Met with pt at bedside and she chose 2 HC's, [facility] and [facility].   1320 Referrals sent. [address], attending about [facility], [facility]. nursing and medication need request by pt.   [facility] called and declined, no staff available for a week. [address] from [facility] called back and accepted, but not available until Monday for therapy needs. Notified Alexa Schulz RN. [facility] about HC therapy needs too. responded will forward to coordinators [facility], [facility]. needs. Then said contact Omar Parker. [address] number and left message with Omar Parker, needs HC orders for therapy. Spoke with answering service, Riddhihunter Trejo who was given [facility] number to call back. 26 Met with pt, her PMD is Zia Hamilton MD  At [facility] Lonnie Richardson 83 called back and placing [facility], [facility]. and therapy orders in and DME for RW.   Jordana Francisco Javier Sood 3 back at [facility] notify of pt's PMD above. 1450 Met with pt at bedside, notified [facility] will have therapy for her Monday. She has a RW at home. [place] from [facility] will get [facility], [facility]. orders off of Epic. Need JL signed. Call [phone] when pt is discharged.

## 2021-04-23 NOTE — PLAN OF CARE
Problem: Pain:  Goal: Pain level will decrease  Description: Pain level will decrease  4/22/2021 2127 by Dennis Carrillo RN  Outcome: Ongoing  4/22/2021 1510 by Idalia Quintana RN  Outcome: Ongoing  Goal: Control of acute pain  Description: Control of acute pain  4/22/2021 2127 by Dennis Carrillo RN  Outcome: Ongoing  4/22/2021 1510 by Idalia Quintana RN  Outcome: Ongoing  Goal: Control of chronic pain  Description: Control of chronic pain  4/22/2021 2127 by Dennis Carrillo RN  Outcome: Ongoing  4/22/2021 1510 by Idalia Quintana RN  Outcome: Ongoing

## 2021-04-23 NOTE — PLAN OF CARE
Problem: Pain:  Goal: Pain level will decrease  Description: Pain level will decrease  Outcome: Ongoing  Goal: Control of acute pain  Description: Control of acute pain  Outcome: Ongoing  Goal: Control of chronic pain  Description: Control of chronic pain  Outcome: Ongoing     Problem: Falls - Risk of:  Goal: Will remain free from falls  Description: Will remain free from falls  Outcome: Ongoing  Goal: Absence of physical injury  Description: Absence of physical injury  Outcome: Ongoing     Problem: Skin Integrity:  Goal: Will show no infection signs and symptoms  Description: Will show no infection signs and symptoms  Outcome: Ongoing  Goal: Absence of new skin breakdown  Description: Absence of new skin breakdown  Outcome: Ongoing     Problem: Musculor/Skeletal Functional Status  Goal: Highest potential functional level  Outcome: Ongoing     Problem: Nutrition  Goal: Optimal nutrition therapy  Description: Nutrition Problem #1: Unintended weight loss  Intervention: Food and/or Nutrient Delivery: Continue Current Diet, Start Oral Nutrition Supplement  Nutritional Goals: PO intake to meet >75% of estimated nutrition needs      Outcome: Ongoing

## 2021-04-23 NOTE — PROGRESS NOTES
Orthopedic Progress Note    Patient:  Lashawn Hamilton  YOB: 1955     73 y.o. female    Subjective:  Patient seen and examined  No complaints or concerns  No issue overnight  Pain continually improving   No acute events overnight per RN  Received 1uprbc yesterday AM, Hgb during the day 8.2. PT: Ambulated 20ft yesterday with PT    Vitals reviewed, afebrile, tachycardic    Objective:   Vitals:    04/22/21 1906   BP: 125/69   Pulse: 103   Resp: 16   Temp: 98 °F (36.7 °C)   SpO2: 97%     Gen: NAD, cooperative     Cardiovascular: Tachycardic     Respiratory: No acute respiratory distress    MSK    RLE: Ace wrap overlying extremity c/d/i, dressings taken down. Incisions well approximated without drainage or signs of infection. Patient able to actively flex/extend digits and ankle without pain. No TTP at the hip. TA/GSC/EHL/FHL gross motor intact. SP/DP/Plantar/Sural/Saphenous nerve SILT. TTP about knee with limited ROM. Compartments are soft and compressible. DP/PT pulses 1+. Recent Labs     04/21/21  0000 04/21/21  0000 04/21/21  2153 04/22/21  0816 04/22/21  1613   WBC 9.1   < > 20.3*  --   --    HGB 6.7*   < > 6.7* 8.2*  --    HCT 21.6*   < > 22.1* 27.1*  --       < > 174  --   --    INR 1.2  --   --   --   --    *   < >  --   --  126*   K 3.5*   < >  --   --  4.3   BUN 3*   < >  --   --  4*   CREATININE 0.28*   < >  --   --  0.31*   GLUCOSE 91   < >  --   --  117*    < > = values in this interval not displayed. Meds: Lovenox    See rec for complete list    Impression 73 y.o. female being seen after a fall from standing height with the following:     - Right supracondylar distal femur fracture s/p open reduction with intramedullary nail insertion, POD#2  - Multiple acute vs. Chronic vertebral compression fractures  - Left ribs 8-12 fractures    Plan    - Hb: 8.2 (4/22)   - Weightbearing status: WBAT to RLE   - Dressings changed today to RLE.  Keep extremity elevated and dressings clean, dry, and intact. Contact ortho if saturated  - NSx ordered MRI lumbar/sacrum yesterday for further evaluation. No NSx intervention planned. - Diet: no restrictions   - Strict ice and elevation for pain/swelling  - DVT ppx: Lovenox. Managed per primary   - Pain control PO/IV Medication. Attempt to Wean IV medications. - Encourage Incentive Spirometry use  - Work with PT/OT for mobilization  - Follow up with Dr. Gary Lizarraga in 10-14 days  - Please page ortho with any questions    Torres Blum DO  PGY-1, Department [address]  5:08 AM 4/23/2021    PGY-2 Addendum    Patient seen and examined. Agree with above assessment and exam by Dr. Vincent Olmedo. Patient continues to do well POD#2 following right femur retrograde IMN. Roni Rmit to go home. No acute events overnight. Medical management per IM team. NSx on board for spinal injuries, no intervention planned from their team. Dressings changed today to RLE. Notify ortho if dressings saturated. Follow up with Dr. Gary Lizarraga in office in 10-14 days.     Radha Valentin DO  Orthopedic Surgery Resident, PGY-2  [facility] [place]

## 2021-04-23 NOTE — PROGRESS NOTES
Physician Progress Note      Rama Alexander  [facility] #:                  [id number]  :                       1955  ADMIT DATE:       2021 12:14 AM  DISCH DATE:  RESPONDING  PROVIDER #:        Puma PARKER DO          QUERY TEXT:    Pt admitted with Right Femur fracture. Pt noted to have Osteoporosis. If   possible, please document in progress notes and discharge summary if you are   evaluating and/or treating any of the following: The medical record reflects the following:  Risk Factors: ETOH, Osteoporosis  Clinical Indicators: Admitted with Rt Femur fracture s/p fall from standing. Noted prior fractures to Left Femur and Lumbar/thoracic compression fractures. Noted Rib fractures. Hx of Osteoporosis and on Home med Prolia. Treatment: Ortho, Neurosurgery consults, Traction and OR for open repair Rt   femur   Options provided:  -- Osteoporotic Rt femur  fracture following fall which would not usually   break a normal, healthy bone  -- Traumatic Rt Femur fracture  -- Other - I will add my own diagnosis  -- Disagree - Not applicable / Not valid  -- Disagree - Clinically unable to determine / Unknown  -- Refer to Clinical Documentation Reviewer    PROVIDER RESPONSE TEXT:    This patient has an osteoporotic fracture of Rt Femur following fall which   would not break a normal, healthy bone.     Query created by: Jayme Resendiz on 2021 12:17 PM      Electronically signed by:  Matt Rosenbaum DO 2021 8:36 AM

## 2021-04-24 NOTE — PLAN OF CARE
Problem: Pain:  Goal: Pain level will decrease  Description: Pain level will decrease  4/24/2021 0502 by Dwain Hill RN  Outcome: Ongoing  4/23/2021 1608 by Reagan Ascencio RN  Outcome: Ongoing  Goal: Control of acute pain  Description: Control of acute pain  4/24/2021 0502 by Dwain Hill RN  Outcome: Ongoing  4/23/2021 1608 by Reagan Ascencio RN  Outcome: Ongoing  Goal: Control of chronic pain  Description: Control of chronic pain  4/24/2021 0502 by Dwain Hill RN  Outcome: Ongoing  4/23/2021 1608 by Reagan Ascencio RN  Outcome: Ongoing     Problem: Falls - Risk of:  Goal: Will remain free from falls  Description: Will remain free from falls  4/24/2021 0502 by Dwain Hill RN  Outcome: Ongoing  4/23/2021 1608 by Reagan Ascencio RN  Outcome: Ongoing  Goal: Absence of physical injury  Description: Absence of physical injury  4/24/2021 0502 by Dwain Hill RN  Outcome: Ongoing  4/23/2021 1608 by Reagan Ascencio RN  Outcome: Ongoing     Problem: Skin Integrity:  Goal: Will show no infection signs and symptoms  Description: Will show no infection signs and symptoms  4/24/2021 0502 by Dwain Hill RN  Outcome: Ongoing  4/23/2021 1608 by Reagan Ascencio RN  Outcome: Ongoing  Goal: Absence of new skin breakdown  Description: Absence of new skin breakdown  4/24/2021 0502 by Dwain Hill RN  Outcome: Ongoing  4/23/2021 1608 by Reagan Ascencio RN  Outcome: Ongoing     Problem: Musculor/Skeletal Functional Status  Goal: Highest potential functional level  4/24/2021 0502 by Dwain Hill RN  Outcome: Ongoing  4/23/2021 1608 by Reagan Ascencio RN  Outcome: Ongoing     Problem: Nutrition  Goal: Optimal nutrition therapy  Description: Nutrition Problem #1: Unintended weight loss  Intervention: Food and/or Nutrient Delivery: Continue Current Diet, Start Oral Nutrition Supplement  Nutritional Goals: PO intake to meet >75% of estimated nutrition needs      4/24/2021 0502 by Dwain Hill RN  Outcome: Ongoing  4/23/2021 1608 by Star Seth RN  Outcome: Ongoing

## 2021-04-24 NOTE — CODE DOCUMENTATION
Rapid Response called at 1242 due to change in mental status. CT head STAT ordered. RN transported to [address] [place] [address]- RN requesting transfer order from trauma resident. Resident in trauma and unable to do so at this time, RN then asked the hospitalist to place order- who then deferred to primary. 56- RN called bed placement who stated there was no transfer order and no icu beds available. (Trauma notified). 1407- transfer order placed        Respiratory May Velásquez) called RN (Sammykylie Navarreter [phone] with concerns of patients status. Both enter room- pt not responding with change in breathing pattern. Respiratory went to get additional supplies. Due to continued difficulty obtaining a pulse ox with patients cool fingers/toes/ears- unable to assess. Dr. Teena Velazquez and Dr. Otis Rivera entered room during this time. Code blue called and CPR begun at 1413. Blood glucose 120. IO started d/t lack of access. 1417- no pulse  1420- no pulse - 1mg Epi given  1421- 1 amp D50 given  1422- pulse - 1mg atropine given  1422:18: no pulse - 1mg epi given  1424- no pulse  1427- /77  1428- epi gtt started at mcg/hr  1429- intubated after succs, etomidate given  1431- calcium 1 unit. EKG obtained  1433- BP 95/52    1432- 1 amp bicarb given  1439- 2gm mag  1441/ 2nd EKG obtained  Pt transferred by icu/RT to room 172- report given nurse to nurse at bedside prior to transfer. Those present during code were:  RN: Victorino Russo Mildred Reader, [facility], [facility]  RT: Aleksandra White [place]  Steve: Luz Marina Fernández MD: Roseline Sun, Franchesca ?sp, Lena Gonzalez    Suspicious bag of belongings found s/p transfer containing an aerosol can and straw. Meredith Morrissey notified.

## 2021-04-24 NOTE — H&P
ICU HP NOTE        PATIENT NAME: Renée Vee  MEDICAL RECORD NO. [id number]  DATE: 4/24/2021    PRIMARY CARE PHYSICIAN: Juliane Gibson MD    HD: # 3    ASSESSMENT    Patient Active Problem List   Diagnosis    Trauma    Fracture of right femur ([address]    Blood alcohol level of 120-199 mg/100 ml    Alcohol intoxication ([address]    Anemia    Fracture of multiple ribs of left side    Fracture of eleventh thoracic vertebra ([address]    Fracture of first lumbar vertebra ([address]    Acute postoperative anemia due to expected blood loss    Hyponatremia    Hypocalcemia    Recent unexplained weight loss    Left foot drop    Multiple fractures of pelvis with stable disruption of pelvic ring, initial encounter for closed fracture (HCC)    Osteoporosis    Vitamin D deficiency    Tobacco use    Cocaine use   HPI:  Renée Vee is a 73 y.o. female that presented to the Emergency Department following a fall [facility] to knees then BUE after mechanically tripping over crack in cement floors. No LOC or head injury, no other injuries reported. Patient could not bear weight on RLE hence called ambulance to ED. No chemical AP/AC. History of osteoporosis. ETOH on board today.      Inj: R femur fx, T11 fx, L1 fx, L rib 8-12Fx    Rib score: 6    4/21: OR R IMN insertion, H&H post op 6.7, 1u pRBC given, post op H&H 7.4 > 6.7, 1u pRBC given post op  4/22: hgb 8.2, medicine consult, CIWA 0, MRI L3 compression deformity, old L1,4,5 compression fx--no OR, NS sign off  4/23: stable, DC planning  4/24: acute change in mental status, cardiac arrest. transfered to ICU    On initial assessment I came to the patient's room and patient was not breathing, pale appearance, no pulses, CPR was started total CPR time about 10 minutes, nonshockable rhythm, 2 rounds of epinephrine and atropine after which we were able to obtain ROSC and patient was started on epi drip and brought to the trauma ICU for further care, CT chest did not show any pulmonary embolism, CT head was unremarkable.       MEDICAL DECISION MAKING AND PLAN    Neuro  Intubated and sedated  -Propofol at 10 mics  -Following commands  -CT head negative    Cards  Post cardiac arrest, PEA arrest  -Multiple rounds of CPR, ROSC achieved started on epi drip  -Epi weaned off started on norepinephrine  -Cardiology consulted  -Bedside echo shows hyperdynamic heart but no LV dysfunction  -Tropes pending, multiple EKGs with some signs of ST segment changes, cards aware    Pulm  Acute respiratory arrest  -Requiring intubation  -Intubated using 6  -Vent settings: Respiratory rate of 14, tidal volume 380, PEEP of 18, FiO2 100%  -Chest x-ray with concern for aspiration  -ABGs indicate metabolic acidosis, PO2 less than 50, increasing PEEP to 20  -Hourly ABGs pending    GI  Gastric ulcer prophylaxis  -Pepcid    Nutrition  -N.p.o.    Renal  Metabolic acidosis  -Lactate of 17, pH less than 7  -Bicarb drip  -Giving 2 A of bicarb at bedside    Bicarb drip, , saline at 75 --1 L of total fluids in    Hyponatremia  -Sodium of 191  -known alcoholic  -Sending urine studies    Electrolytes  -Sodium 122, potassium 5.3, calcium ionized 1.29    Renal function  -BUN 5, creatinine 0.38      ID  Concern for sepsis upstairs, aspiration pneumonia  -White count of 31 before arrival, rechecked at 21  -[facility] Zosyn  -Cultures pending    HEME   status post recent procedure, blood loss anemia  -Hemoglobin of 6.8  -Transfusing 1 unit     ENDO  Goal glucose less than 180  TSH pending    DVT: Lovenox held    CHECKLIST    RASS: 1  RESTRAINTS: b/l wrist  IVF: bicarb with , Ns 75  NUTRITION: NPO  ANTIBIOTICS: vanc/zosyn  GI: NPO  DVT: lovenox held  GLYCEMIC CONTROL: na  HOB >45: na    SUBJECTIVE    Carole Thompson acute cardiac arrest, ROSC achieved, on Levophed, x-ray showing aspiration pneumonia, covering Zosyn broad-spectrum concern for sepsis, acute blood loss anemia transfusing 1 unit, acute metabolic acidosis on bicarb drip has gotten 2 A of bicarb. OBJECTIVE  VITALS: Temp: Temp: 97.6 °F (36.4 °C)Temp  Av.8 °F (36.6 °C)  Min: 97.6 °F (36.4 °C)  Max: 98 °F (39.3 °C) BP Systolic (08BDN), WYC:552 , Min:93 , OZ   Diastolic (95XZS), IJY:79, Min:50, Max:81   Pulse Pulse  Av.8  Min: 116  Max: 125 Resp Resp  Av.4  Min: 16  Max: 22 Pulse ox SpO2  Av.8 %  Min: 80 %  Max: 100 %    CONSTITUTIONAL: intubated and sedated  HEAD: atraumatic, normocephalic  EYES: nonreactive pupils  ENT: ears are symmetric, nares patent   HEENT: moist mucous membranes  NECK: Supple, symmetrical, trachea midline  LUNGS: wheezing and rales lower brit, fine crackles on the R lung base  CARDIOVASCULAR: +S1/S2  ABDOMEN: soft,  nondistended,   NEUROLOGIC: intubated and sedated, following commands off prop  EXTREMITIES: peripheral pulses normal, no pedal edema, no calf tenderness  SKIN: normal coloration and turgor      LAB:  CBC:   Recent Labs     21  2153 21  0816 21  0611 21  1015   WBC 20.3*  --  20.7* 31.0*   HGB 6.7* 8.2* 8.2* 8.2*   HCT 22.1* 27.1* 24.5* 25.3*   MCV 82.8  --  78.3* 81.4*     --  161 247     BMP:   Recent Labs     21  1613 21  0611 21  1015   * 125* 122*   K 4.3 4.2 5.3   CL 98 98 91*   CO2 19* 19* 11*   BUN 4* 3* 5*   CREATININE 0.31* 0.25* 0.38*   GLUCOSE 117* 103* 107*         RADIOLOGY:  Xr Chest (single View Frontal)    Result Date: 2021  No acute cardiopulmonary disease. Xr Femur Right (min 2 Views)    Result Date: 2021  Postoperative changes from internal fixation of the comminuted distal femoral fracture. Xr Femur Right (min 2 Views)    Result Date: 2021  Marginal reduction of displacement and overriding with no change in anterior angulation. Xr Femur Right (min 2 Views)    Result Date: 2021  Distal femur fracture as described with associated moderate knee joint effusion/hemarthrosis. Osteoporosis.      Xr Knee Right (3 Views)    Result Date: 4/21/2021  Marginal reduction of displacement and overriding with no change in anterior angulation. Xr Knee Right (3 Views)    Result Date: 4/21/2021  Comminuted distal right femur fracture as described with intra-articular extension. Osteoporosis. Moderate knee joint effusion/hemarthrosis. Xr Tibia Fibula Right (2 Views)    Result Date: 4/21/2021  Distal femur fracture. Please refer to previous reports. Osteoporosis. Old healed distal tibial fracture. 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 Other possibilities include enchondroma or less likely low-grade chondrosarcoma. Correlation with old studies would be most useful. An addendum can be made should they become available. Ct Head Wo Contrast    Result Date: 4/24/2021  Mild central and cortical cerebral atrophy. Mild chronic deep white matter ischemic changes No acute intracranial abnormalities are noted. Ct Head Wo Contrast    Result Date: 4/24/2021  No acute intracranial abnormality. No significant change in brain findings compared to the April 21, 2021 study. RECOMMENDATIONS: If further evaluation is needed, MRI is suggested. Ct Head Wo Contrast    Result Date: 4/21/2021  No acute intracranial abnormality. Ct Cervical Spine Wo Contrast    Result Date: 4/21/2021  No acute abnormality of the cervical spine. Ct Knee Right Wo Contrast    Result Date: 4/21/2021  Distal right femoral fracture with nondisplaced and possibly incomplete sagittal plane fracture in the distal portion extending to the lateral aspect of the intercondylar notch. Small to moderate hemarthrosis. Sclerotic lesion centrally within the tibial metaphysis most likely representing old bone infarct. Enchondroma is also a consideration. Recommend obtaining old comparison studies to document expected stability. Low-grade chondrosarcoma cannot be entirely excluded but is considered unlikely.      Mri Lumbar Spine W Wo Contrast    Result Date: 4/22/2021  Posterior fixation and laminectomy from N4-Q6 without complication. Acute/subacute compression deformity at L3. Remote compression deformity at L1, L4, and L5. Multilevel degenerative change with canal stenosis at L3-4 and bilateral foraminal narrowing as described above. Xr Chest Portable    Result Date: 4/24/2021  Diffuse bilateral interstitial airspace disease, new since the prior study from April 21, 2021. Developing pulmonary edema is suspected. Ct Chest Pulmonary Embolism W Contrast    Result Date: 4/24/2021  1. No evidence for pulmonary embolism. 2.  Extensive bilateral airspace disease with ground-glass and scattered consolidative opacities. Trace pleural effusions are also noted. These findings are new since CT exam 3 days ago. These findings may represent multifocal inflammatory process, pulmonary hemorrhage or edema. 3.  Gaseous distention of the stomach. Mri Sacrum Coccyx W Wo Contrast    Result Date: 4/22/2021  1. Acute or subacute nondisplaced fractures of the bilateral sacral alae and posterior right iliac bone. 2. No suspicious marrow space-occupying lesion. Ct Chest Abdomen Pelvis W Contrast    Result Date: 4/21/2021  Acute nondisplaced left rib fractures posteriorly and laterally involving ribs 8 through 12. Ribs 10, 11 and 12 are fractured in 2 locations. There is no evidence overlying soft tissue injury and therefore does not meet graded criteria for chest wall injury. Possible acute compression deformities of mild degree at T11 and L1. Multiple old healed bilateral rib fractures more so on the left. And multiple old mild anterior wedge compression deformities. Ct Lumbar Spine Trauma Reconstruction    Result Date: 4/21/2021  1. Multilevel thoracolumbar vertebral body remote appearing compression fractures, as discussed above.  2. For clinical suspicion of superimposed acute vertebral compression, recommend MRI thoracic and lumbar spine examinations for further evaluation. 3. L4 through S1 posterior lumbar interbody fusion with L4 vertebral body screws appearing to expand beyond the superior endplate cortex. 4. 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 Cannot exclude association with metastatic disease. Recommend clinical correlation. Ct Thoracic Spine Trauma Reconstruction    Result Date: 4/21/2021  1. Multilevel thoracolumbar vertebral body remote appearing compression fractures, as discussed above. 2. For clinical suspicion of superimposed acute vertebral compression, recommend MRI thoracic and lumbar spine examinations for further evaluation. 3. L4 through S1 posterior lumbar interbody fusion with L4 vertebral body screws appearing to expand beyond the superior endplate cortex. 4. 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 Cannot exclude association with metastatic disease. Recommend clinical correlation. Xr Hip 2-3 Vw W Pelvis Right    Result Date: 4/21/2021  No acute abnormality evident. Posttraumatic changes on the left. Osteoporosis. Postop changes of the lumbar fusion and total left hip arthroplasty. Nancy Manjarrez MD  4/24/21, 3:56 PM               Trauma Attending Abiola Youngblood      I have reviewed the above [facility] note(s) and confirmed the key elements of the medical history and physical exam. I have seen and examined the pt. I have discussed the findings, established the care plan and recommendations with Resident, [facility] RN, bedside nurse. Pt was seen on morning rounds. She stated she felt fine and when asked if she was hungry she stated she was not. We had a discussion about her leukocytosis and I was going to continue to work her up. She did have a small cough but did not complain of SOB. I saw this patient around 10am today for evaluation with the bedside RN and the resident. We took her ace wrap off to eval wounds from ortho as a possible source for her wbc. She did not complain of pain when examining her leg   Apparently, around 1:40 this afternoon she coded and had roughly 10-12 min of CPR and was intubated and sent to the ICU. I was in a trauma Stabbing ex-lap during this portion of her case. I saw her again after the OR in the TICU where she was having low sats and hypotension. She has had an extensive workup and will be followed up on   I think this is septic shock with acute respiratory failure and is now in severe ARDS   Updated several family members.    ABGs reviewed and several amps of bicarb were given and we have been constantly evaluating her o2 sats   Trend lactic acid, BNP, and Trops  In total I have spent 120 min of Critical care min      Ruthy Richardson DO  4/24/2021  10:05 PM

## 2021-04-24 NOTE — PROGRESS NOTES
[facility]    Office: [address], DO, Aladir Umm, DO, Jayme Kevin, DO, Droothy Yang Blood, DO, Wicho Kang MD, Goran Starks MD, nAca Vines MD, Louise Rashid MD, Adi Irvin MD, Manolo Tse MD, Tez Sanchez MD, Debi Holt MD, Angelique Pavon MD, Hima Whitney, DO, Ki Alegre MD, Nicanor Renner MD, Tita Mai, DO, Taj Cheney MD,  Ashly Rebollar, DO, Rain Martinez MD, Jose Guadalupe Anderson MD, Feroz Haynes, [facility], [address], CNP, [employer], CNP, Joleen Schneider, CNS, Annika Zuñiga, CNP, Vannie Epley, CNP, Mirian Ladd, CNP, Nora Gandhi, CNP, Torri yN, CNP, German Yi PA-C, Ahsan Hernandez, [facility], Reg Rincon, CNP, Pilar Campos, CNP, Anabel Hui, CNP, Gurwinder Theodore, [facility], [facility], [address]    Progress Note    4/24/2021    1:19 PM    Name:   Clarisa Charles  MRN:     [id number]     Acct:      [de-identified]   Room:   [address] [place] [address] Day:  3  Admit Date:  4/21/2021 12:14 AM    PCP:   Sari Primrose, MD  Code Status:  Full Code    Subjective:     C/C:   Chief Complaint   Patient presents with    Leg Injury     Right, deformity @ mid femur w/ shortening    Fall       Interval History Status:  Papid called for mental status change  More confused  Slurred speech    Data Base Updates:  WBC 31.0High Panic k/uL RBC3. 11Low m/uL Hemoglobin8. 2Low     Lptxqak000Ydxh mg/dL     GBJ1Ctt mg/dL   CREATININE0. 38Low mg/dL   Bun/Cre RatioNOT REPORTED     Calcium8. 2Low mg/dL   Ipreer024Pds     Albumin2. 5Low g/dL Albumin/Globulin Ratio0. 8Low Alkaline Qebgbaylcsl554Qvax U/L   ALT23U/L YXT75Oxxm U/L   Total Bilirubin0.79mg/dL   Bilirubin, Direct0. 49High mg/dL Bilirubin, Indirect0.30mg/dL JBB1Vhb mg/dL     Calcium7.5Low mg/dL   CREATININE0. 25Low mg/dL   Illkvxs641Ldrw mg/dL   Total Protein5.5Low g/dL   [id number]         Brief History:     Patient was admitted thru ER with:  \"Carole A Intravenous 2 times per day    acetaminophen  1,000 mg Oral 3 times per day    methocarbamol  750 mg Oral TID    gabapentin  300 mg Oral TID    polyethylene glycol  17 g Oral Daily    sennosides-docusate sodium  1 tablet Oral BID     Continuous Infusions:    sodium chloride       PRN Meds: LORazepam, sodium chloride flush, oxyCODONE, sodium chloride flush, bisacodyl, ondansetron    Data:     Past Medical History:   has a past medical history of Falls, Osteoporosis, and Vitamin D deficiency. Social History:   reports that she has been smoking. She does not have any smokeless tobacco history on file. She reports current alcohol use. She reports that she does not use drugs. Family History:   Family History   Problem Relation Age of Onset    Cancer Mother         Patient believes the primary site was ovarian    Prostate Cancer Father        Review of Systems:     Review of Systems   Constitutional: Positive for activity change (Reduced) and unexpected weight change (Reports a 20 pound weight loss). Respiratory: Negative for cough and shortness of breath. Cardiovascular: Positive for chest pain (Rib pain ). Negative for palpitations. Gastrointestinal: Negative for abdominal pain, nausea and vomiting. Genitourinary: Negative for flank pain and hematuria. Musculoskeletal: Positive for arthralgias, back pain, gait problem (She did ambulate with a walker) and myalgias. Left hip pain  Her back pain is well controlled   Psychiatric/Behavioral: Positive for confusion. Physical Examination:        Physical Exam  Vitals signs reviewed. Constitutional:       General: She is not in acute distress. Appearance: She is not diaphoretic. HENT:      Head: Normocephalic. Nose: Nose normal.   Eyes:      General: No scleral icterus. Conjunctiva/sclera: Conjunctivae normal.   Neck:      Musculoskeletal: Neck supple. Trachea: No tracheal deviation.    Cardiovascular:      Rate and Rhythm: Normal rate and regular rhythm. Pulmonary:      Effort: Pulmonary effort is normal. No respiratory distress. Breath sounds: Rhonchi present. No wheezing or rales. Chest:      Chest wall: Tenderness (rib pain ) present. Abdominal:      General: Bowel sounds are normal. There is no distension. Palpations: Abdomen is soft. Tenderness: There is no abdominal tenderness. Musculoskeletal:         General: Deformity (Contracture left hand noted) present. No tenderness. Skin:     General: Skin is warm and dry. Neurological:      Mental Status: She is alert. Comments: Confused  More somnolent  Following commands  Moving ext x 4         Vitals:  /66   Pulse 116   Temp 97.6 °F (36.4 °C) (Oral)   Resp 20   Ht 5' 4\" (1.626 m)   Wt 119 lb (54 kg)   SpO2 (!) 80%   BMI 20.43 kg/m²   Temp (24hrs), Av.8 °F (36.6 °C), Min:97.6 °F (36.4 °C), Max:98 °F (36.7 °C)    Recent Labs     21  1232   POCGLU 81       I/O (24Hr):   No intake or output data in the 24 hours ending 21 1319    Labs:  Hematology:  Recent Labs     21  2153 21  0816 21  0611 21  1015   WBC 20.3*  --  20.7* 31.0*   RBC 2.67*  --  3.13* 3.11*   HGB 6.7* 8.2* 8.2* 8.2*   HCT 22.1* 27.1* 24.5* 25.3*   MCV 82.8  --  78.3* 81.4*   MCH 25.1*  --  26.2 26.4   MCHC 30.3  --  33.5 32.4   RDW 22.0*  --  19.4* 20.5*     --  161 247   MPV 8.9  --  8.8 8.9     Chemistry:  Recent Labs     21  1613 21  0611 21  1015   * 125* 122*   K 4.3 4.2 5.3   CL 98 98 91*   CO2 19* 19* 11*   GLUCOSE 117* 103* 107*   BUN 4* 3* 5*   CREATININE 0.31* 0.25* 0.38*   ANIONGAP 9 8* 20*   LABGLOM >60 >60 >60   GFRAA >60 >60 >60   CALCIUM 7.3* 7.5* 8.2*   CAION  --  1.16  --      Recent Labs     21  0611 21  1232   PROT 5.5*  --    LABALBU 2.5*  --    AST 71*  --    ALT 23  --    ALKPHOS 152*  --    BILITOT 0.79  --    BILIDIR 0.49*  --    POCGLU  --  81     ABG:  Lab Results Component Value Date    POCPH 7.051 04/24/2021    POCPCO2 18.0 04/24/2021    POCPO2 51.6 04/24/2021    POCHCO3 5.0 04/24/2021    NBEA 24 04/24/2021    PBEA NOT REPORTED 04/24/2021    LLD5HXX NOT REPORTED 04/24/2021    VHCV3MPX 71 04/24/2021    FIO2 6.0 04/24/2021     No results found for: SPECIAL  No results found for: CULTURE    Radiology:  Xr Chest (single View Frontal)    Result Date: 4/22/2021  No acute cardiopulmonary disease. Xr Femur Right (min 2 Views)    Result Date: 4/21/2021  Postoperative changes from internal fixation of the comminuted distal femoral fracture. Xr Femur Right (min 2 Views)    Result Date: 4/21/2021  Marginal reduction of displacement and overriding with no change in anterior angulation. Xr Femur Right (min 2 Views)    Result Date: 4/21/2021  Distal femur fracture as described with associated moderate knee joint effusion/hemarthrosis. Osteoporosis. Xr Knee Right (3 Views)    Result Date: 4/21/2021  Marginal reduction of displacement and overriding with no change in anterior angulation. Xr Knee Right (3 Views)    Result Date: 4/21/2021  Comminuted distal right femur fracture as described with intra-articular extension. Osteoporosis. Moderate knee joint effusion/hemarthrosis. Xr Tibia Fibula Right (2 Views)    Result Date: 4/21/2021  Distal femur fracture. Please refer to previous reports. Osteoporosis. Old healed distal tibial fracture. 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 Other possibilities include enchondroma or less likely low-grade chondrosarcoma. Correlation with old studies would be most useful. An addendum can be made should they become available. Ct Head Wo Contrast    Result Date: 4/21/2021  No acute intracranial abnormality. Ct Cervical Spine Wo Contrast    Result Date: 4/21/2021  No acute abnormality of the cervical spine.      Ct Knee Right Wo Contrast    Result Date: 4/21/2021  Distal right femoral body screws appearing to expand beyond the superior endplate cortex. 4. 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 Cannot exclude association with metastatic disease. Recommend clinical correlation. Ct Thoracic Spine Trauma Reconstruction    Result Date: 4/21/2021  1. Multilevel thoracolumbar vertebral body remote appearing compression fractures, as discussed above. 2. For clinical suspicion of superimposed acute vertebral compression, recommend MRI thoracic and lumbar spine examinations for further evaluation. 3. L4 through S1 posterior lumbar interbody fusion with L4 vertebral body screws appearing to expand beyond the superior endplate cortex. 4. 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 Cannot exclude association with metastatic disease. Recommend clinical correlation. Xr Hip 2-3 Vw W Pelvis Right    Result Date: 4/21/2021  No acute abnormality evident. Posttraumatic changes on the left. Osteoporosis. Postop changes of the lumbar fusion and total left hip arthroplasty. Assessment:        Active Problems:    Trauma    Fracture of right femur (HCC)    Blood alcohol level of 120-199 mg/100 ml    Alcohol intoxication ([address]    Anemia    Fracture of multiple ribs of left side    Fracture of eleventh thoracic vertebra ([address]    Fracture of first lumbar vertebra (HCC)    Acute postoperative anemia due to expected blood loss    Hyponatremia    Hypocalcemia    Recent unexplained weight loss    Tobacco use    Cocaine use  Resolved Problems:    * No resolved hospital problems.  *      Plan:        Recommendations:  Stat CT brain  Check ABG's hypoxia  CXR  Blood cultures   Urine culture  Observe for sepsis  Trauma evaluation in progress  Ortho evaluation in progress  Detox  Observe for DTs  Ativan  Thiamine   Alcohol abstinence  Cocaine abstinence  Rehab program suggested  (The patient declined saying she has \"already done that and she does not believe she has a problem at this time\")  Transfuse as needed  Smoking cessation / education   Pain management  Correct electrolyte abnormalities   DVT prophylaxis - on Lovenox  Complete work-up on outpatient basis for unexplained weight loss  (Colonoscopy planned later this month)         IP CONSULT TO ORTHOPEDIC SURGERY  IP CONSULT TO TRAUMA SURGERY  IP CONSULT TO NEUROSURGERY  IP CONSULT TO INTERNAL MEDICINE  IP CONSULT TO IV TEAM  IP CONSULT TO IV TEAM  IP CONSULT TO HOME CARE NEEDS    Kristen Troy DO  4/24/2021  1:19 PM

## 2021-04-24 NOTE — PLAN OF CARE
Problem: Pain:  Goal: Pain level will decrease  Description: Pain level will decrease  4/24/2021 1819 by Lilian Diaz RN  Outcome: Ongoing  4/24/2021 0502 by Cleo Cooper RN  Outcome: Ongoing  Goal: Control of acute pain  Description: Control of acute pain  4/24/2021 1819 by Lilian Diaz RN  Outcome: Ongoing  4/24/2021 0502 by Cleo Cooper RN  Outcome: Ongoing  Goal: Control of chronic pain  Description: Control of chronic pain  4/24/2021 1819 by Lilian Diaz RN  Outcome: Ongoing  4/24/2021 0502 by Cleo Cooper RN  Outcome: Ongoing     Problem: Falls - Risk of:  Goal: Will remain free from falls  Description: Will remain free from falls  4/24/2021 1819 by Lilian Diaz RN  Outcome: Ongoing  4/24/2021 0502 by Cleo Cooper RN  Outcome: Ongoing  Goal: Absence of physical injury  Description: Absence of physical injury  4/24/2021 1819 by Lilian Diaz RN  Outcome: Ongoing  4/24/2021 0502 by Cleo Cooper RN  Outcome: Ongoing     Problem: Skin Integrity:  Goal: Will show no infection signs and symptoms  Description: Will show no infection signs and symptoms  4/24/2021 1819 by Lilian Diaz RN  Outcome: Ongoing  4/24/2021 0502 by Cleo Cooper RN  Outcome: Ongoing  Goal: Absence of new skin breakdown  Description: Absence of new skin breakdown  4/24/2021 1819 by Lilian Diaz RN  Outcome: Ongoing  4/24/2021 0502 by Cleo Cooper RN  Outcome: Ongoing     Problem: Musculor/Skeletal Functional Status  Goal: Highest potential functional level  4/24/2021 1819 by Lilian Diaz RN  Outcome: Ongoing  4/24/2021 0502 by Cleo Cooper RN  Outcome: Ongoing     Problem: Nutrition  Goal: Optimal nutrition therapy  Description: Nutrition Problem #1: Unintended weight loss  Intervention: Food and/or Nutrient Delivery: Continue Current Diet, Start Oral Nutrition Supplement  Nutritional Goals: PO intake to meet >75% of estimated nutrition needs      4/24/2021 1819 by Lilian Diaz RN  Outcome: Ongoing 4/24/2021 0502 by Lucy Akbar RN  Outcome: Ongoing     Problem: OXYGENATION/RESPIRATORY FUNCTION  Goal: Patient will maintain patent airway  4/24/2021 1819 by Bel Cabezas RN  Outcome: Ongoing  4/24/2021 1541 by Kit Bui  Outcome: Ongoing  Goal: Patient will achieve/maintain normal respiratory rate/effort  Description: Respiratory rate and effort will be within normal limits for the patient  4/24/2021 1819 by Bel Cabezas RN  Outcome: Ongoing  4/24/2021 1541 by Kit Bui  Outcome: Ongoing     Problem: MECHANICAL VENTILATION  Goal: Patient will maintain patent airway  4/24/2021 1819 by Bel Cabezas RN  Outcome: Ongoing  4/24/2021 1541 by Kit Bui  Outcome: Ongoing  Goal: Oral health is maintained or improved  4/24/2021 1819 by Bel Cabezas RN  Outcome: Ongoing  4/24/2021 1541 by Kit Bui  Outcome: Ongoing  Goal: ET tube will be managed safely  4/24/2021 1819 by Bel Cabezas RN  Outcome: Ongoing  4/24/2021 1541 by Kit Bui  Outcome: Ongoing  Goal: Ability to express needs and understand communication  4/24/2021 1819 by Bel Cabezas RN  Outcome: Ongoing  4/24/2021 1541 by Kit Bui  Outcome: Ongoing  Goal: Mobility/activity is maintained at optimum level for patient  4/24/2021 1819 by Bel Cabezas RN  Outcome: Ongoing  4/24/2021 1541 by Kit Bui  Outcome: Ongoing     Problem: SKIN INTEGRITY  Goal: Skin integrity is maintained or improved  4/24/2021 1819 by Bel Cabezas RN  Outcome: Ongoing  4/24/2021 1541 by Kit Bui  Outcome: Ongoing     Problem: Non-Violent Restraints  Goal: Removal from restraints as soon as assessed to be safe  Outcome: Ongoing  Goal: No harm/injury to patient while restraints in use  Outcome: Ongoing  Goal: Patient's dignity will be maintained  Outcome: Ongoing

## 2021-04-24 NOTE — FLOWSHEET NOTE
SPIRITUAL CARE DEPARTMENT - [facility]  PROGRESS NOTE    Shift date: 04/24/2021  Shift day: Saturday   Shift # 2    Room # 6652/6818-09   Name: Alexandro Burris            Age: 73 y.o. Gender: female          Anabaptism: Unknown   Place of Islam: Unknown    Referral: Rapid Response    Admit Date & Time: 4/21/2021 12:14 AM    PATIENT/EVENT DESCRIPTION:  Alexandro Burris is a 73 y.o. female paged as a rapid response. Patient had slurry speech and was exhibiting \"stroke-like symptoms. \" Patient was taken for a \"Stat CT. \"      SPIRITUAL ASSESSMENT/INTERVENTION:  Patient was approachable but seemed confused. Patient's fiance showed up about 10 minutes after the rapid was called. He seemed calm and supportive.  provided ministry of presence and active listening. SPIRITUAL CARE FOLLOW-UP PLAN:  Chaplains will remain available to offer spiritual and emotional support as needed. Electronically signed by Cailin Garcia, on 4/24/2021 at 1:25 PM.  [address]  [phone]       04/24/21 1324   Encounter Summary   Services provided to: Patient;Significant other   Referral/Consult From: Multi-disciplinary team   Support System Significant other   Continue Visiting   (04/24/2021)   Complexity of Encounter Moderate   Length of Encounter 45 minutes   Spiritual Assessment Completed Yes   Crisis   Type Rapid response   Assessment Approachable; Anxious   Intervention Active listening;Sustaining presence/ Ministry of presence   Outcome Expressed gratitude

## 2021-04-24 NOTE — PROGRESS NOTES
Charting Intubations and reintubations    ETT Size: 7.5  ETT placement: 23cm at the lip    Tube placement verified by:   Auscultation: Y  Positive color change on ETCO2 detector: Y  CXR: to follow    Reason for intubation: Code Blue    Intubated by: Dr Harvey Cranker

## 2021-04-24 NOTE — PROGRESS NOTES
Physical Therapy    DATE: 2021    NAME: Dave Hernandez  MRN: [id number]   : 1955      Patient not seen this date for Physical Therapy due to: \"I am not doing therapy today\" stated several times. Pt reports that she was up to the bathroom and that she will be going home. I will leave her on the PT list for  incase she isn't discharged today.           Electronically signed by Shaun Olmedo PTA on 2021 at 8:58 AM

## 2021-04-24 NOTE — PROGRESS NOTES
intact  LUNGS: clear to ausculation, without wheezes, rales or rhonci  HEART: normal rate and regular rhythm  ABDOMEN: soft, non-tender and non-distended  EXTREMITY: RLE dressings dry and clean    No intake/output data recorded. Drain/tube output:  No intake/output data recorded.     LAB:  CBC:   Recent Labs     04/21/21  2153 04/22/21  0816 04/23/21  0611 04/24/21  1015   WBC 20.3*  --  20.7* 31.0*   HGB 6.7* 8.2* 8.2* 8.2*   HCT 22.1* 27.1* 24.5* 25.3*   MCV 82.8  --  78.3* 81.4*     --  161 247     BMP:   Recent Labs     04/22/21  1613 04/23/21  0611 04/24/21  1015   * 125* 122*   K 4.3 4.2 5.3   CL 98 98 91*   CO2 19* 19* 11*   BUN 4* 3* 5*   CREATININE 0.31* 0.25* 0.38*   GLUCOSE 117* 103* 107*     COAGS:   Recent Labs     04/23/21  0611   PROT 5.5*       RADIOLOGY:  See radiology report      Deric Guerrier MD  4/24/21, 3:30 PM

## 2021-04-24 NOTE — PROCEDURES
PROCEDURE NOTE - EMERGENCY INTUBATION    PATIENT NAME: Kevon Arvizu  MEDICAL RECORD NO. [id number]  DATE: 2021  ATTENDING PHYSICIAN: Dr Mary Ellen Bravo DIAGNOSIS:  Cardiac arrest  POSTOPERATIVE DIAGNOSIS:  Same  PROCEDURE PERFORMED:  Emergency endotracheal intubation  PERFORMING PHYSICIAN: Quin Boss MD    MEDICATIONS: Unable to premedicate due to the emergent nature of the procedure    DISCUSSION:  Kevon Arvizu is a 73y.o.-year-old female who requires intubation and ventilatory support due to airway protection and cardiac arrest.  The history and physical examination were reviewed and confirmed. CONSENT: Unable to be obtained due to the emergent nature of this procedure. PROCEDURE:  A timeout was initiated by the bedside nurse and was confirmed by those present. The patient was placed in the appropriate position. Cricoid pressure was not required. Intubation was performed by direct laryngoscopy using a laryngoscope and a 7.5 cuffed endotracheal tube. The cuff was then inflated and the tube was secured appropriately at a distance of 24 cm to the dental ridge. Initial confirmation of placement included bilateral breath sounds, an end tidal CO2 detector, tube fogging and adequate chest rise. A chest x-ray to verify correct placement of the tube has been ordered but is still pending. The patient tolerated the procedure well.      COMPLICATIONS:  Aspiration      Quin Boss MD  3:35 PM, 21

## 2021-04-25 NOTE — FLOWSHEET NOTE
SPIRITUAL CARE NOTE    Referral from Charge RN for family support of pt who's prognosis is grim. Family gathering and received update from Doctor. Doctor also suggested they discuss pt wishes and code status. Family emotional.  Family rotating in to visit pt room as pt is now in a rotoprone bed. Those present: Altamease Kurt; granddaughter: Layne Henning; boyfriend: Luisito England. Writer prayed with family and provided supportive presence.     Abad Cota

## 2021-04-25 NOTE — PLAN OF CARE
Problem: Falls - Risk of:  Goal: Will remain free from falls  Description: Will remain free from falls  4/25/2021 0315 by Susy Khan RN  Outcome: Ongoing     Problem: Skin Integrity:  Goal: Will show no infection signs and symptoms  Description: Will show no infection signs and symptoms  4/25/2021 0315 by Susy Khan RN  Outcome: Ongoing     Problem: SKIN INTEGRITY  Goal: Skin integrity is maintained or improved  4/25/2021 0315 by Susy Khan RN  Outcome: Ongoing     Problem: Non-Violent Restraints  Goal: Removal from restraints as soon as assessed to be safe  4/25/2021 0315 by Susy Khan RN  Outcome: Ongoing     Problem: Non-Violent Restraints  Goal: No harm/injury to patient while restraints in use  4/25/2021 0315 by Susy Khan RN  Outcome: Ongoing     Problem: Non-Violent Restraints  Goal: Patient's dignity will be maintained  4/25/2021 0315 by Susy Khan RN  Outcome: Ongoing

## 2021-04-25 NOTE — PLAN OF CARE
Problem: Pain:  Goal: Pain level will decrease  Description: Pain level will decrease  Outcome: Ongoing  Goal: Control of acute pain  Description: Control of acute pain  Outcome: Ongoing  Goal: Control of chronic pain  Description: Control of chronic pain  Outcome: Ongoing     Problem: Falls - Risk of:  Goal: Will remain free from falls  Description: Will remain free from falls  4/25/2021 1647 by Sherrie Conway RN  Outcome: Ongoing  4/25/2021 0315 by Edi Webber RN  Outcome: Ongoing  Goal: Absence of physical injury  Description: Absence of physical injury  Outcome: Ongoing     Problem: Skin Integrity:  Goal: Will show no infection signs and symptoms  Description: Will show no infection signs and symptoms  4/25/2021 1647 by Sherrie Conway RN  Outcome: Ongoing  4/25/2021 0717 by Khanh Sahni RCP  Outcome: Ongoing  4/25/2021 0315 by Edi Webber RN  Outcome: Ongoing  Goal: Absence of new skin breakdown  Description: Absence of new skin breakdown  4/25/2021 1647 by Sherrie Conway RN  Outcome: Ongoing  4/25/2021 0717 by Khanh Sahni RCP  Outcome: Ongoing     Problem: Musculor/Skeletal Functional Status  Goal: Highest potential functional level  Outcome: Ongoing     Problem: Nutrition  Goal: Optimal nutrition therapy  Description: Nutrition Problem #1: Unintended weight loss  Intervention: Food and/or Nutrient Delivery: Continue Current Diet, Start Oral Nutrition Supplement  Nutritional Goals: PO intake to meet >75% of estimated nutrition needs      Outcome: Ongoing     Problem: OXYGENATION/RESPIRATORY FUNCTION  Goal: Patient will maintain patent airway  4/25/2021 1647 by Sherrie Conway RN  Outcome: Ongoing  4/25/2021 0717 by Khanh Sahni RCP  Outcome: Ongoing  Goal: Patient will achieve/maintain normal respiratory rate/effort  Description: Respiratory rate and effort will be within normal limits for the patient  4/25/2021 1647 by Sherrie Conway RN  Outcome: Ongoing  4/25/2021 0717 by Holli Mascorro Skyler Hernandez RCP  Outcome: Ongoing     Problem: MECHANICAL VENTILATION  Goal: Patient will maintain patent airway  4/25/2021 1647 by Lc Burns RN  Outcome: Ongoing  4/25/2021 0717 by Talib Malik RCP  Outcome: Ongoing  Goal: Oral health is maintained or improved  4/25/2021 1647 by Lc Burns RN  Outcome: Ongoing  4/25/2021 0717 by Talib Malik RCP  Outcome: Ongoing  Goal: ET tube will be managed safely  4/25/2021 1647 by Lc Burns RN  Outcome: Ongoing  4/25/2021 0717 by Talib Malik RCP  Outcome: Ongoing  Goal: Ability to express needs and understand communication  4/25/2021 1647 by Lc Burns RN  Outcome: Ongoing  4/25/2021 0717 by Talib Malik RCP  Outcome: Ongoing  Goal: Mobility/activity is maintained at optimum level for patient  4/25/2021 1647 by Lc Burns RN  Outcome: Ongoing  4/25/2021 0717 by Talib Malik RCP  Outcome: Ongoing     Problem: SKIN INTEGRITY  Goal: Skin integrity is maintained or improved  4/25/2021 1647 by Lc Burns RN  Outcome: Ongoing  4/25/2021 0717 by Talib Malik RCP  Outcome: Ongoing  4/25/2021 0315 by Tanya Tomlin RN  Outcome: Ongoing

## 2021-04-25 NOTE — PLAN OF CARE
Problem: OXYGENATION/RESPIRATORY FUNCTION  Goal: Patient will maintain patent airway  4/24/2021 2237 by Misti Sanabria RCP  Outcome: Ongoing     Problem: OXYGENATION/RESPIRATORY FUNCTION  Goal: Patient will achieve/maintain normal respiratory rate/effort  Description: Respiratory rate and effort will be within normal limits for the patient  4/24/2021 2237 by Misti Sanabria RCP  Outcome: Ongoing     Problem: MECHANICAL VENTILATION  Goal: Patient will maintain patent airway  4/24/2021 2237 by Misti Sanabria RCP  Outcome: Ongoing     Problem: MECHANICAL VENTILATION  Goal: Oral health is maintained or improved  4/24/2021 2237 by Misti Sanabria RCP  Outcome: Ongoing     Problem: MECHANICAL VENTILATION  Goal: ET tube will be managed safely  4/24/2021 2237 by Misti Sanabria RCP  Outcome: Ongoing     Problem: MECHANICAL VENTILATION  Goal: Ability to express needs and understand communication  4/24/2021 2237 by Misti Sanabria RCP  Outcome: Ongoing     Problem: MECHANICAL VENTILATION  Goal: Mobility/activity is maintained at optimum level for patient  4/24/2021 2237 by Misti Sanabria RCP  Outcome: Ongoing

## 2021-04-25 NOTE — PROGRESS NOTES
Central Line    Date/Time: 4/24/2021 9:04 PM  Performed by: Edu Medel MD  Authorized by: Holland Jensen DO   Consent: The procedure was performed in an emergent situation. Test results: test results available and properly labeled  Imaging studies: imaging studies available  Required items: required blood products, implants, devices, and special equipment available  Patient identity confirmed: arm band  Indications: vascular access    Anesthesia:  Local Anesthetic: lidocaine 1% without epinephrine  Preparation: skin prepped with alcohol  Skin prep agent dried: skin prep agent completely dried prior to procedure  Use of maximum sterile barriers during central venous catheter insertion: Sterile gloves, mask, sheet, gown   Hand hygiene: hand hygiene performed prior to central venous catheter insertion  Location details: left femoral  Patient position: Trendelenburg  Catheter type: triple lumen  Catheter size: 7 Fr  Pre-procedure: landmarks identified  Ultrasound guidance: yes  Sterile ultrasound techniques: sterile gel and sterile probe covers were used  Number of attempts: 1  Successful placement: yes  Post-procedure: line sutured and dressing applied  Assessment: blood return through all ports and free fluid flow  Patient tolerance: patient tolerated the procedure well with no immediate complications  Comments: Dr Osiel Duenas was immediately available for assistance. Trauma Attending Attestation      I have reviewed the above [facility] note(s) and confirmed the key elements of the medical history and physical exam. I have seen and examined the pt. I have discussed the findings, established the care plan and recommendations with Resident, GCS RN, bedside nurse.         Luis Carlos Arshad DO  4/24/2021  9:38 PM

## 2021-04-25 NOTE — PROGRESS NOTES
04/25/21 1721   Vent Information   Vent Type Servo i   Vent Mode Bi-Level   Time high 5.5 cmH2O   Time low 0.4 cmH2O   Pressure High 30 cmH2O   Pressure low 0 cmH2O   Pressure Support 10 cmH20   FiO2  100 %   PEEP/CPAP 0

## 2021-04-25 NOTE — PLAN OF CARE
Outcome: Ongoing  4/24/2021 1819 by Shanna Lawrence RN  Outcome: Ongoing  Goal: Mobility/activity is maintained at optimum level for patient  4/25/2021 0717 by Leta Loera RCP  Outcome: Ongoing  4/24/2021 2237 by Aubree Graf RCP  Outcome: Ongoing  4/24/2021 1819 by Shanna Lawrence RN  Outcome: Ongoing     Problem: SKIN INTEGRITY  Goal: Skin integrity is maintained or improved  4/25/2021 0717 by Leta Loera RCP  Outcome: Ongoing  4/25/2021 0315 by Lanre Larson RN  Outcome: Ongoing  4/24/2021 2237 by Aubree Graf RCP  Outcome: Ongoing  4/24/2021 1819 by Shanna Lawrence RN  Outcome: Ongoing

## 2021-04-25 NOTE — PROGRESS NOTES
04/25/21 1812   Vent Information   Vent Type Servo i   Vent Mode PRVC   Vt Ordered 390 mL   Rate Set 34 bmp   FiO2  100 %   SpO2 (!) 52 %   PEEP/CPAP 18   I Time/ I Time % 0.8 s

## 2021-04-25 NOTE — PROGRESS NOTES
The patient experienced CPA 4/24  Transferred to ICU  The hospitalist service will be availabe upon stabilization and transfer out of ICU

## 2021-04-25 NOTE — PROGRESS NOTES
04/25/21 1037   Vent Information   Vent Type Servo i   Vent Mode Bi-Level   Time high 5.5 cmH2O  (per Dr Dena Gaytan)   Time low 0.5 cmH2O   Pressure High 34 cmH2O   Pressure low 0 cmH2O   Pressure Support 10 cmH20   FiO2  100 %   Sensitivity 2   PEEP/CPAP 0

## 2021-04-25 NOTE — H&P
ICU HP NOTE        PATIENT NAME: Patrizia Galarza  MEDICAL RECORD NO. [id number]  DATE: 4/25/2021    PRIMARY CARE PHYSICIAN: Evelyne Sanchez MD    HD: # 4    ASSESSMENT    Patient Active Problem List   Diagnosis    Trauma    Fracture of right femur ([address]    Blood alcohol level of 120-199 mg/100 ml    Alcohol intoxication ([address]    Anemia    Fracture of multiple ribs of left side    Fracture of eleventh thoracic vertebra ([address]    Fracture of first lumbar vertebra ([address]    Acute postoperative anemia due to expected blood loss    Hyponatremia    Hypocalcemia    Recent unexplained weight loss    Left foot drop    Multiple fractures of pelvis with stable disruption of pelvic ring, initial encounter for closed fracture (HCC)    Osteoporosis    Vitamin D deficiency    Tobacco use    Cocaine use   HPI:  Patrizia Galarza is a 73 y.o. female that presented to the Emergency Department following a fall from [facility] to knees then BUE after mechanically tripping over crack in cement floors. No LOC or head injury, no other injuries reported. Patient could not bear weight on RLE hence called ambulance to ED. No chemical AP/AC. History of osteoporosis. ETOH on board today.      Inj: R femur fx, T11 fx, L1 fx, L rib 8-12Fx    Rib score: 6    4/21: OR R IMN insertion, H&H post op 6.7, 1u pRBC given, post op H&H 7.4 > 6.7, 1u pRBC given post op  4/22: hgb 8.2, medicine consult, CIWA 0, MRI L3 compression deformity, old L1,4,5 compression fx--no OR, NS sign off  4/23: stable, DC planning  4/24: acute change in mental status, cardiac arrest. transfered to ICU    On initial assessment I came to the patient's room and patient was not breathing, pale appearance, no pulses, CPR was started total CPR time about 10 minutes, nonshockable rhythm, 2 rounds of epinephrine and atropine after which we were able to obtain ROSC and patient was started on epi drip and brought to the trauma ICU for further care, CT chest did not show any pulmonary embolism, CT head was unremarkable. MEDICAL DECISION MAKING AND PLAN    Neuro  Intubated and sedated  -Propofol at 20 mics  -Fentanyl at 75  -Following commands intermittently   -CT head negative    Cards  Post cardiac arrest, PEA arrest  -Multiple rounds of CPR, ROSC achieved started on epi drip  -Epi weaned off started on norepinephrine  -Cardiology consulted  -Bedside echo shows hyperdynamic heart but no LV dysfunction  -Tropes pending, multiple EKGs with some signs of ST segment changes, cards aware  - trop 220, AM trop pending    Hypotensive  - Levophed 50  - Vasso 0.04  - Epi 2mcg  - map goal 65     CHF  - BNP 18K  - echo read pending     Pulm  Acute respiratory arrest, ARDS  -Requiring intubation  -Vent settings: APRV- pHIGH of [email or web address], sating 60%  -ABGs: 7.20, 58, 44 - PF: 44 (SEVER ARDS)  -Hourly ABGs pending  - Plans for PRONE  - CXR: Worsening CXR since 4/21 with consolidate of R lung, ards, edema    Aspiration PMN  - vanc/zosyn  -bronc?     GI  Gastric ulcer prophylaxis  -Pepcid    Nutrition  -N.p.o.    Renal  Metabolic acidosis  -Lactate of 17, pH less than 7  - down trending at 14.9   - Bicarb drip at 100cc  -Giving multiple AMPs of bicarb total 11    Bicarb drip, LR 75    In: 5724.3 [I.V.:5281; Blood:383.3; NG/GT:60]  Out: 365 [Urine:365] 0.3cc/kg/hr    Oligouria  - lasix 40 IV consider  - last hour 25-30      Intake/Output Summary (Last 24 hours) at 4/25/2021 4572  Last data filed at 4/25/2021 0400  Gross per 24 hour   Intake 5724.33 ml   Output 365 ml   Net 5359.33 ml         Hyponatremia  -Sodium of 122--130--135 and 139 turn NS off   -known alcoholic  -Sending urine studies    Electrolytes  -Sodium 139, potassium 3.6, 1.7, 7.7    Renal function  -BUN 9, creatinine 0.70      ID  Concern for sepsis upstairs, aspiration pneumonia  -White count of 31 before arrival, rechecked at 20  - WBC down to 6.3, procal pending, cortisol pending  - Tmax 100.2  -Rodriguez Zosyn  -Cultures pending    HEME status post recent procedure, blood loss anemia  -Hemoglobin of 6.8--stable this AM @9.2  -Transfusing 1 unit     ENDO  Hypoglycemia  - requiring D50 AMPs  - started on D10 @75   Goal glucose less than 180  TSH pending-- Normal    DVT: Lovenox     CHECKLIST    RASS: 1  RESTRAINTS: b/l wrist  IVF: bicarb with , Ns 75  NUTRITION: NPO  ANTIBIOTICS: vanc/zosyn  GI: NPO  DVT: lovenox held  GLYCEMIC CONTROL: na  HOB >45: na    SUBJECTIVE    Carole SERG Thompson overnight hypoglycemia, hypotension, fever at 100.2      acute cardiac arrest, ROSC achieved, on Levophed, x-ray showing aspiration pneumonia, covering Zosyn broad-spectrum concern for sepsis, acute blood loss anemia transfusing 1 unit, acute metabolic acidosis on bicarb drip has gotten 2 A of bicarb.       OBJECTIVE  VITALS: Temp: Temp: 98.2 °F (36.8 °C)Temp  Av.2 °F (36.2 °C)  Min: 95.4 °F (35.2 °C)  Max: 100.2 °F (04.8 °C) BP Systolic (44IXD), RWR:03 , Min:69 , LGI:446   Diastolic (66JRT), QWV:50, Min:55, Max:84   Pulse Pulse  Av  Min: 116  Max: 131 Resp Resp  Av.1  Min: 11  Max: 35 Pulse ox SpO2  Av.9 %  Min: 59 %  Max: 100 %    CONSTITUTIONAL: intubated and sedated  HEAD: atraumatic, normocephalic  EYES: nonreactive pupils  ENT: ears are symmetric, nares patent   HEENT: moist mucous membranes  NECK: Supple, symmetrical, trachea midline  LUNGS: wheezing and rales lower brit, fine crackles on the R lung base  CARDIOVASCULAR: +S1/S2  ABDOMEN: soft,  nondistended,   NEUROLOGIC: intubated and sedated, following commands off prop  EXTREMITIES: peripheral pulses normal, no pedal edema, no calf tenderness  SKIN: normal coloration and turgor      LAB:  CBC:   Recent Labs     21  1015 21  1529 21  2047 21  0347   WBC 31.0* 20.6*  --  6.3   HGB 8.2* 6.8* 9.5* 9.2*   HCT 25.3* 23.5* 30.4* 29.4*   MCV 81.4* 89.0  --  85.2    265  --  198     BMP:   Recent Labs     21  1529 21  1758 21 04/25/21  0347   * 130* 135 139   K 3.9  --  3.6* 3.6*   CL 87*  --  95* 95*   CO2 7*  --  19* 21   BUN 7*  --  8 9   CREATININE 0.62  --  0.58 0.70   GLUCOSE 147*  --  104* 10*         RADIOLOGY:  Xr Chest (single View Frontal)    Result Date: 4/22/2021  No acute cardiopulmonary disease. Xr Femur Right (min 2 Views)    Result Date: 4/21/2021  Postoperative changes from internal fixation of the comminuted distal femoral fracture. Xr Femur Right (min 2 Views)    Result Date: 4/21/2021  Marginal reduction of displacement and overriding with no change in anterior angulation. Xr Femur Right (min 2 Views)    Result Date: 4/21/2021  Distal femur fracture as described with associated moderate knee joint effusion/hemarthrosis. Osteoporosis. Xr Knee Right (3 Views)    Result Date: 4/21/2021  Marginal reduction of displacement and overriding with no change in anterior angulation. Xr Knee Right (3 Views)    Result Date: 4/21/2021  Comminuted distal right femur fracture as described with intra-articular extension. Osteoporosis. Moderate knee joint effusion/hemarthrosis. Xr Tibia Fibula Right (2 Views)    Result Date: 4/21/2021  Distal femur fracture. Please refer to previous reports. Osteoporosis. Old healed distal tibial fracture. 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 Other possibilities include enchondroma or less likely low-grade chondrosarcoma. Correlation with old studies would be most useful. An addendum can be made should they become available. Ct Head Wo Contrast    Result Date: 4/24/2021  Mild central and cortical cerebral atrophy. Mild chronic deep white matter ischemic changes No acute intracranial abnormalities are noted. Ct Head Wo Contrast    Result Date: 4/24/2021  No acute intracranial abnormality. No significant change in brain findings compared to the April 21, 2021 study.  RECOMMENDATIONS: If further evaluation is needed, MRI is suggested. Ct Head Wo Contrast    Result Date: 4/21/2021  No acute intracranial abnormality. Ct Cervical Spine Wo Contrast    Result Date: 4/21/2021  No acute abnormality of the cervical spine. Ct Knee Right Wo Contrast    Result Date: 4/21/2021  Distal right femoral fracture with nondisplaced and possibly incomplete sagittal plane fracture in the distal portion extending to the lateral aspect of the intercondylar notch. Small to moderate hemarthrosis. Sclerotic lesion centrally within the tibial metaphysis most likely representing old bone infarct. Enchondroma is also a consideration. Recommend obtaining old comparison studies to document expected stability. Low-grade chondrosarcoma cannot be entirely excluded but is considered unlikely. Mri Lumbar Spine W Wo Contrast    Result Date: 4/22/2021  Posterior fixation and laminectomy from O8-G5 without complication. Acute/subacute compression deformity at L3. Remote compression deformity at L1, L4, and L5. Multilevel degenerative change with canal stenosis at L3-4 and bilateral foraminal narrowing as described above. Xr Chest Portable    Result Date: 4/24/2021  Diffuse bilateral interstitial airspace disease, new since the prior study from April 21, 2021. Developing pulmonary edema is suspected. Ct Chest Pulmonary Embolism W Contrast    Result Date: 4/24/2021  1. No evidence for pulmonary embolism. 2.  Extensive bilateral airspace disease with ground-glass and scattered consolidative opacities. Trace pleural effusions are also noted. These findings are new since CT exam 3 days ago. These findings may represent multifocal inflammatory process, pulmonary hemorrhage or edema. 3.  Gaseous distention of the stomach. Mri Sacrum Coccyx W Wo Contrast    Result Date: 4/22/2021  1. Acute or subacute nondisplaced fractures of the bilateral sacral alae and posterior right iliac bone. 2. No suspicious marrow space-occupying lesion.      Ct Chest Abdomen Pelvis W Contrast    Result Date: 4/21/2021  Acute nondisplaced left rib fractures posteriorly and laterally involving ribs 8 through 12. Ribs 10, 11 and 12 are fractured in 2 locations. There is no evidence overlying soft tissue injury and therefore does not meet graded criteria for chest wall injury. Possible acute compression deformities of mild degree at T11 and L1. Multiple old healed bilateral rib fractures more so on the left. And multiple old mild anterior wedge compression deformities. Ct Lumbar Spine Trauma Reconstruction    Result Date: 4/21/2021  1. Multilevel thoracolumbar vertebral body remote appearing compression fractures, as discussed above. 2. For clinical suspicion of superimposed acute vertebral compression, recommend MRI thoracic and lumbar spine examinations for further evaluation. 3. L4 through S1 posterior lumbar interbody fusion with L4 vertebral body screws appearing to expand beyond the superior endplate cortex. 4. 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 Cannot exclude association with metastatic disease. Recommend clinical correlation. Ct Thoracic Spine Trauma Reconstruction    Result Date: 4/21/2021  1. Multilevel thoracolumbar vertebral body remote appearing compression fractures, as discussed above. 2. For clinical suspicion of superimposed acute vertebral compression, recommend MRI thoracic and lumbar spine examinations for further evaluation. 3. L4 through S1 posterior lumbar interbody fusion with L4 vertebral body screws appearing to expand beyond the superior endplate cortex. 4. 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 Cannot exclude association with metastatic disease. Recommend clinical correlation. Xr Hip 2-3 Vw W Pelvis Right    Result Date: 4/21/2021  No acute abnormality evident. Posttraumatic changes on the left. Osteoporosis. Postop changes of the lumbar fusion and total left hip arthroplasty. Chandrika Chino MD  4/24/21, 7:01 AM               Trauma Attending Henry Walls      I have reviewed the above GCS note(s) and confirmed the key elements of the medical history and physical exam. I have seen and examined the pt. I have discussed the findings, established the care plan and recommendations with Resident, GCS RN, bedside nurse. Pt was seen on morning rounds. She stated she felt fine and when asked if she was hungry she stated she was not. We had a discussion about her leukocytosis and I was going to continue to work her up. She did have a small cough but did not complain of SOB. I saw this patient around 10am today for evaluation with the bedside RN and the resident. We took her ace wrap off to eval wounds from ortho as a possible source for her wbc. She did not complain of pain when examining her leg   Apparently, around 1:40 this afternoon she coded and had roughly 10-12 min of CPR and was intubated and sent to the ICU. I was in a trauma Stabbing ex-lap during this portion of her case. I saw her again after the OR in the TICU where she was having low sats and hypotension. She has had an extensive workup and will be followed up on   I think this is septic shock with acute respiratory failure and is now in severe ARDS   Updated several family members.    ABGs reviewed and several amps of bicarb were given and we have been constantly evaluating her o2 sats   Trend lactic acid, BNP, and Trops  In total I have spent 120 min of Critical care min      Chandrika Chino MD  4/25/2021  7:01 AM

## 2021-04-25 NOTE — PROGRESS NOTES
ICU HP NOTE        PATIENT NAME: Renée Vee  MEDICAL RECORD NO. [id number]  DATE: 4/25/2021    PRIMARY CARE PHYSICIAN: Juliane iGbson MD    HD: # 4    ASSESSMENT    Patient Active Problem List   Diagnosis    Trauma    Fracture of right femur ([facility] [address]    Blood alcohol level of 120-199 mg/100 ml    Alcohol intoxication ([facility] [address]    Anemia    Fracture of multiple ribs of left side    Fracture of eleventh thoracic vertebra ([address]    Fracture of first lumbar vertebra ([address]    Acute postoperative anemia due to expected blood loss    Hyponatremia    Hypocalcemia    Recent unexplained weight loss    Left foot drop    Multiple fractures of pelvis with stable disruption of pelvic ring, initial encounter for closed fracture (HCC)    Osteoporosis    Vitamin D deficiency    Tobacco use    Cocaine use   HPI:  Renée Vee is a 73 y.o. female that presented to the Emergency Department following a fall from [facility] to knees then BUE after mechanically tripping over crack in cement floors. No LOC or head injury, no other injuries reported. Patient could not bear weight on RLE hence called ambulance to ED. No chemical AP/AC. History of osteoporosis. ETOH on board today. History of multiple drug abuse, positive tox for cocaine.      Inj: R femur fx, T11 fx, L1 fx, L rib 8-12Fx    Rib score: 6    4/21: OR R IMN insertion, H&H post op 6.7, 1u pRBC given, post op H&H 7.4 > 6.7, 1u pRBC given post op  4/22: hgb 8.2, medicine consult, CIWA 0, MRI L3 compression deformity, old L1,4,5 compression fx--no OR, NS sign off  4/23: stable, DC planning  4/24: acute change in mental status, cardiac arrest. transfered to ICU    On initial assessment I came to the patient's room and patient was not breathing, pale appearance, no pulses, CPR was started total CPR time about 10 minutes, nonshockable rhythm, 2 rounds of epinephrine and atropine after which we were able to obtain ROSC and patient was started on epi drip and brought to the PSR's:  Please call patient to assist in scheduling an appointment with Dr. Carrion on 5/31/18 at 9:10 am or 7/9/18 at 11 am.  Thanks.    Per 2/20/18 OV notes:  Follow-up   Follow-up in about 6 weeks for a follow-up. May consider biologic therapy in the future if at all possible. May call the rep from this company to see if she may be a candidate.   trauma ICU for further care, CT chest did not show any pulmonary embolism, CT head was unremarkable.       MEDICAL DECISION MAKING AND PLAN    Neuro  Intubated and sedated  -Propofol at 50 mics  - nimbex   -Fentanyl at 75  - versed 1mg'hr   - BIS index  -Following commands intermittently   -CT head negative    Cards  Post cardiac arrest, PEA arrest  -Multiple rounds of CPR, ROSC achieved started on epi drip  -Epi weaned off started on norepinephrine  -Cardiology consulted  -Bedside echo shows hyperdynamic heart but no LV dysfunction  -Tropes pending, multiple EKGs with some signs of ST segment changes, cards aware  - trop 220, AM trop pending    Hypotensive  - Levophed 50  - Vasso 0.04  - Epi 2mcg  - map goal 65     CHF  - BNP 18K  - echo read pending     Pulm  Acute respiratory arrest, ARDS  -Requiring intubation  -Vent settings: APRV- pHIGH of [email or web address], sating 60%  -ABGs: 7.20, 58, 44 - PF: 44 (SEVER ARDS)  -Hourly ABGs pending  - Plans for PRONE  - CXR: Worsening CXR since 4/21 with consolidate of R lung, ards, edema  - likes PRVC     Aspiration PMN  - vanc/zosyn  -bronc?  - 100mg q6 hydrocor    GI  Gastric ulcer prophylaxis  -Pepcid    Nutrition  -N.p.o.    hyperammon  - latulose 20g TID--DC  - ammonia of 67    Renal  Metabolic acidosis  -Lactate of 17, pH less than 7  - down trending at 14.9   - Bicarb drip at 100cc  -Giving multiple AMPs of bicarb total 11    Bicarb drip, LR 75    In: 5724.3 [I.V.:5281; Blood:383.3; NG/GT:60]  Out: 365 [Urine:365] 0.3cc/kg/hr    Oligouria  - lasix 40 IV consider  - last hour 25-30      Intake/Output Summary (Last 24 hours) at 4/25/2021 0732  Last data filed at 4/25/2021 0400  Gross per 24 hour   Intake 5724.33 ml   Output 365 ml   Net 5359.33 ml         Hyponatremia  -Sodium of 122--130--135 and 139 turn NS off   -known alcoholic  -Sending urine studies    Electrolytes  -Sodium 139, potassium 3.6, 1.7, 7.7    Renal function  -BUN 9, creatinine 0.70      ID  Concern for sepsis upstairs, aspiration pneumonia  -White count of 31 before arrival, rechecked at 20  - WBC down to 6.3, procal pending, cortisol pending  - Tmax 100.2  -Rodriguez Zosyn  -Cultures pending    HEME   status post recent procedure, blood loss anemia  -Hemoglobin of 6.8--stable this AM @9.2  -Transfusing 1 unit     ENDO  Hypoglycemia  - requiring D50 AMPs  - started on D10 @75 --DC bc glucose improved  Goal glucose less than 180  TSH pending-- Normal    DVT: Lovenox     CHECKLIST    RASS: 1  RESTRAINTS: b/l wrist  IVF: bicarb with , Ns 75  NUTRITION: NPO  ANTIBIOTICS: vanc/zosyn  GI: NPO  DVT: lovenox held  GLYCEMIC CONTROL: na  HOB >45: na    SUBJECTIVE    Carole A Donna overnight hypoglycemia, hypotension, fever at 100.2      acute cardiac arrest, ROSC achieved, on Levophed, x-ray showing aspiration pneumonia, covering Zosyn broad-spectrum concern for sepsis, acute blood loss anemia transfusing 1 unit, acute metabolic acidosis on bicarb drip has gotten 2 A of bicarb.       OBJECTIVE  VITALS: Temp: Temp: 98.2 °F (36.8 °C)Temp  Av.1 °F (36.2 °C)  Min: 95.4 °F (35.2 °C)  Max: 100.2 °F (84.5 °C) BP Systolic (10SRT), UWO:23 , Min:69 , PTY:861   Diastolic (04GBI), XUU:80, Min:55, Max:84   Pulse Pulse  Av.9  Min: 116  Max: 131 Resp Resp  Av.1  Min: 11  Max: 35 Pulse ox SpO2  Av.8 %  Min: 59 %  Max: 94 %    CONSTITUTIONAL: intubated and sedated  HEAD: atraumatic, normocephalic  EYES: nonreactive pupils  ENT: ears are symmetric, nares patent   HEENT: moist mucous membranes  NECK: Supple, symmetrical, trachea midline  LUNGS: wheezing and rales lower brit, fine crackles on the R lung base  CARDIOVASCULAR: +S1/S2  ABDOMEN: soft,  nondistended,   NEUROLOGIC: intubated and sedated, following commands off prop  EXTREMITIES: peripheral pulses normal, no pedal edema, no calf tenderness  SKIN: normal coloration and turgor      LAB:  CBC:   Recent Labs     21  1015 21  1529 21  2047 21 0347   WBC 31.0* 20.6*  --  6.3   HGB 8.2* 6.8* 9.5* 9.2*   HCT 25.3* 23.5* 30.4* 29.4*   MCV 81.4* 89.0  --  85.2    265  --  198     BMP:   Recent Labs     04/24/21  1529 04/24/21  1758 04/24/21  2047 04/25/21  0347   * 130* 135 139   K 3.9  --  3.6* 3.6*   CL 87*  --  95* 95*   CO2 7*  --  19* 21   BUN 7*  --  8 9   CREATININE 0.62  --  0.58 0.70   GLUCOSE 147*  --  104* 10*         RADIOLOGY:  Xr Chest (single View Frontal)    Result Date: 4/22/2021  No acute cardiopulmonary disease. Xr Femur Right (min 2 Views)    Result Date: 4/21/2021  Postoperative changes from internal fixation of the comminuted distal femoral fracture. Xr Femur Right (min 2 Views)    Result Date: 4/21/2021  Marginal reduction of displacement and overriding with no change in anterior angulation. Xr Femur Right (min 2 Views)    Result Date: 4/21/2021  Distal femur fracture as described with associated moderate knee joint effusion/hemarthrosis. Osteoporosis. Xr Knee Right (3 Views)    Result Date: 4/21/2021  Marginal reduction of displacement and overriding with no change in anterior angulation. Xr Knee Right (3 Views)    Result Date: 4/21/2021  Comminuted distal right femur fracture as described with intra-articular extension. Osteoporosis. Moderate knee joint effusion/hemarthrosis. Xr Tibia Fibula Right (2 Views)    Result Date: 4/21/2021  Distal femur fracture. Please refer to previous reports. Osteoporosis. Old healed distal tibial fracture. 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 Other possibilities include enchondroma or less likely low-grade chondrosarcoma. Correlation with old studies would be most useful. An addendum can be made should they become available. Ct Head Wo Contrast    Result Date: 4/24/2021  Mild central and cortical cerebral atrophy. Mild chronic deep white matter ischemic changes No acute intracranial abnormalities are noted.      Ct Head Wo Contrast    Result Date: 4/24/2021  No acute intracranial abnormality. No significant change in brain findings compared to the April 21, 2021 study. RECOMMENDATIONS: If further evaluation is needed, MRI is suggested. Ct Head Wo Contrast    Result Date: 4/21/2021  No acute intracranial abnormality. Ct Cervical Spine Wo Contrast    Result Date: 4/21/2021  No acute abnormality of the cervical spine. Ct Knee Right Wo Contrast    Result Date: 4/21/2021  Distal right femoral fracture with nondisplaced and possibly incomplete sagittal plane fracture in the distal portion extending to the lateral aspect of the intercondylar notch. Small to moderate hemarthrosis. Sclerotic lesion centrally within the tibial metaphysis most likely representing old bone infarct. Enchondroma is also a consideration. Recommend obtaining old comparison studies to document expected stability. Low-grade chondrosarcoma cannot be entirely excluded but is considered unlikely. Mri Lumbar Spine W Wo Contrast    Result Date: 4/22/2021  Posterior fixation and laminectomy from N0-N8 without complication. Acute/subacute compression deformity at L3. Remote compression deformity at L1, L4, and L5. Multilevel degenerative change with canal stenosis at L3-4 and bilateral foraminal narrowing as described above. Xr Chest Portable    Result Date: 4/24/2021  Diffuse bilateral interstitial airspace disease, new since the prior study from April 21, 2021. Developing pulmonary edema is suspected. Ct Chest Pulmonary Embolism W Contrast    Result Date: 4/24/2021  1. No evidence for pulmonary embolism. 2.  Extensive bilateral airspace disease with ground-glass and scattered consolidative opacities. Trace pleural effusions are also noted. These findings are new since CT exam 3 days ago. These findings may represent multifocal inflammatory process, pulmonary hemorrhage or edema. 3.  Gaseous distention of the stomach.      [address] [facility] Wo Contrast    Result Date: 4/22/2021  1. Acute or subacute nondisplaced fractures of the bilateral sacral alae and posterior right iliac bone. 2. No suspicious marrow space-occupying lesion. Ct Chest Abdomen Pelvis W Contrast    Result Date: 4/21/2021  Acute nondisplaced left rib fractures posteriorly and laterally involving ribs 8 through 12. Ribs 10, 11 and 12 are fractured in 2 locations. There is no evidence overlying soft tissue injury and therefore does not meet graded criteria for chest wall injury. Possible acute compression deformities of mild degree at T11 and L1. Multiple old healed bilateral rib fractures more so on the left. And multiple old mild anterior wedge compression deformities. Ct Lumbar Spine Trauma Reconstruction    Result Date: 4/21/2021  1. Multilevel thoracolumbar vertebral body remote appearing compression fractures, as discussed above. 2. For clinical suspicion of superimposed acute vertebral compression, recommend MRI thoracic and lumbar spine examinations for further evaluation. 3. L4 through S1 posterior lumbar interbody fusion with L4 vertebral body screws appearing to expand beyond the superior endplate cortex. 4. 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 Cannot exclude association with metastatic disease. Recommend clinical correlation. Ct Thoracic Spine Trauma Reconstruction    Result Date: 4/21/2021  1. Multilevel thoracolumbar vertebral body remote appearing compression fractures, as discussed above. 2. For clinical suspicion of superimposed acute vertebral compression, recommend MRI thoracic and lumbar spine examinations for further evaluation. 3. L4 through S1 posterior lumbar interbody fusion with L4 vertebral body screws appearing to expand beyond the superior endplate cortex. 4. 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   Cannot exclude association with metastatic disease. Recommend clinical correlation. Xr Hip 2-3 Vw W Pelvis Right    Result Date: 4/21/2021  No acute abnormality evident. Posttraumatic changes on the left. Osteoporosis. Postop changes of the lumbar fusion and total left hip arthroplasty. Sandi Long MD  4/24/21, 7:32 AM               Trauma Attending Maricel Brown      I have reviewed the above [facility]CS note(s) and confirmed the key elements of the medical history and physical exam. I have seen and examined the pt. I have discussed the findings, established the care plan and recommendations with Resident, GCS RN, bedside nurse. Pt was seen on morning rounds. She stated she felt fine and when asked if she was hungry she stated she was not. We had a discussion about her leukocytosis and I was going to continue to work her up. She did have a small cough but did not complain of SOB. I saw this patient around 10am today for evaluation with the bedside RN and the resident. We took her ace wrap off to eval wounds from ortho as a possible source for her wbc. She did not complain of pain when examining her leg   Apparently, around 1:40 this afternoon she coded and had roughly 10-12 min of CPR and was intubated and sent to the ICU. I was in a trauma Stabbing ex-lap during this portion of her case. I saw her again after the OR in the TICU where she was having low sats and hypotension. She has had an extensive workup and will be followed up on   I think this is septic shock with acute respiratory failure and is now in severe ARDS   Updated several family members.    ABGs reviewed and several amps of bicarb were given and we have been constantly evaluating her o2 sats   Trend lactic acid, BNP, and Trops  In total I have spent 120 min of Critical care min      Sandi Long MD  4/25/2021  7:32 AM

## 2021-04-26 NOTE — PLAN OF CARE
Problem: OXYGENATION/RESPIRATORY FUNCTION  Goal: Patient will maintain patent airway  4/26/2021 0054 by Max Lujan RCP  Outcome: Ongoing     Problem: OXYGENATION/RESPIRATORY FUNCTION  Goal: Patient will achieve/maintain normal respiratory rate/effort  Description: Respiratory rate and effort will be within normal limits for the patient  4/26/2021 0054 by Max Lujan RCP  Outcome: Ongoing     Problem: MECHANICAL VENTILATION  Goal: Patient will maintain patent airway  4/26/2021 0054 by Max Lujan RCP  Outcome: Ongoing     Problem: MECHANICAL VENTILATION  Goal: Oral health is maintained or improved  4/26/2021 0054 by Max Lujan RCP  Outcome: Ongoing     Problem: MECHANICAL VENTILATION  Goal: ET tube will be managed safely  4/26/2021 0054 by Max Lujan RCP  Outcome: Ongoing     Problem: MECHANICAL VENTILATION  Goal: Ability to express needs and understand communication  4/26/2021 0054 by Max Lujan RCP  Outcome: Ongoing     Problem: MECHANICAL VENTILATION  Goal: Mobility/activity is maintained at optimum level for patient  4/26/2021 0054 by Max Lujan RCP  Outcome: Ongoing

## 2021-04-26 NOTE — PROGRESS NOTES
Brief Progress Note:      Called to bedside with Dr. Denisa Torres to evaluate Pt who has refractory tachycardia. Pt currently remains  In prone positioning for respiratory distress. Gave 6mg adenosine, with HR improved to 110s. Currently on 3 pressors. Pt remains in critical condition. Codie Goldstein, PGY1  Surgery Department        I personally evaluated the patient and directed the medical decision making with Resident/YAYA after the physical/radiologic exam and laboratory values were reviewed and confirmed.      Efren Dobson MD

## 2021-04-26 NOTE — CARE COORDINATION
Transitional Planning    Spoke to patient's daughter's at the bedside, Isabela. Talked about [facility] and SNFs. Gave them lists for both and requested 1 choice for LTACH and 3 choices for SNF. Verbalized understanding. F/U needed for choices. [id number] Mariana Rodriguez with [facility]. called, she was informed that pt was going to be discharged on Saturday, staff arrived at pt's home today and no one was there. Informed Koki Stark that pt was not discharged and that discharge plans may change for pt. She asked that CM call if [facility]. is still needed. They will keep pt on their list [email or web address]

## 2021-04-26 NOTE — PLAN OF CARE
Problem: Falls - Risk of:  Goal: Will remain free from falls  Description: Will remain free from falls  4/26/2021 0117 by Ezio Antoine RN  Outcome: Ongoing     Problem: Skin Integrity:  Goal: Will show no infection signs and symptoms  Description: Will show no infection signs and symptoms  4/26/2021 0117 by Ezio Antoine RN  Outcome: Ongoing     Problem: SKIN INTEGRITY  Goal: Skin integrity is maintained or improved  4/26/2021 0117 by Ezio Antoine RN  Outcome: Ongoing

## 2021-04-26 NOTE — PROGRESS NOTES
04/26/21 1954   ETT (adult)   Placement Date: 04/24/21   Preoxygenation: Yes  Mask Ventilation: Ventilated by mask (1)  Technique: Video laryngoscopy  Type: Cuffed  Tube Size: 7.5 mm  Laryngoscope: GlideScope  Location: Oral  Placement Verified By[de-[email or web address]] Auscultation;Capnometry; Chest X... Secured at 26 cm   Measured From Lips   ET tube advanced 2cm from 24 to 26cm at the lip per Dr. Zainab Marsh verbal order. STAT CXR to be obtained.

## 2021-04-26 NOTE — PROGRESS NOTES
Patient proned for a total of 22 hours. discussed with trauma team regarding skin assessments. Instructed to continue proning until Dr. Rachel Menendez arrives for rounds. Several parts of assessment deferred until patient is to be turned supine.

## 2021-04-26 NOTE — PROGRESS NOTES
Comprehensive Nutrition Assessment    Type and Reason for Visit:  Reassess    Nutrition Recommendations/Plan: Monitor care plans. Start nutrition support as appropriate. Nutrition Assessment:  Chart reviewed. Pt is now intubated; s/p cardiac arrest on 4/24/21. Proning pt at present. No nutrition support at this time. Meds/labs reviewed. Malnutrition Assessment:  Malnutrition Status:  Insufficient data    Context:  Chronic Illness       Estimated Daily Nutrient Needs:  Energy (kcal):  6299-1210 kcal/d (25-30 kcal/kg); Weight Used for Energy Requirements:  Admission     Protein (g):  60-70 g protein/d (1.2-1.4 g/kg); Weight Used for Protein Requirements:  Current(51.3 kg)        Fluid (ml/day):  1550 mL fluid/d; Method Used for Fluid Requirements:  ml/Kg(30)      Nutrition Related Findings:  Meds/labs reviewed. Rotoprone bed. Wounds:  Surgical Incision       Current Nutrition Therapies:    Diet NPO Effective Now Exceptions are: Sips of Water with Meds    Anthropometric Measures:  · Height: 5' 1\" (154.9 cm)  · Current Body Weight: 119 lb (54 kg)   · Admission Body Weight: 113 lb (51.3 kg)    · Ideal Body Weight: 105 lbs; % Ideal Body Weight 94.2 %   · BMI: 22.5  · BMI Categories: Normal Weight (BMI 18.5-24. 9)       Nutrition Diagnosis:   · Unintended weight loss related to poor appetite as evidenced by weight loss, poor intake prior to admission    · Inadequate oral intake related to impaired respiratory function, acute injury/trauma as evidenced by NPO or clear liquid status due to medical condition    Nutrition Interventions:   Food and/or Nutrient Delivery:  Continue NPO, Start nutrition support as appropriate  Nutrition Education/Counseling:  No recommendation at this time   Coordination of Nutrition Care:  Continue to monitor while inpatient    Goals:  meet % of estimated nutrient needs -progress towards goal declining     Nutrition Monitoring and Evaluation:   Behavioral-Environmental Outcomes:  None Identified   Food/Nutrient Intake Outcomes:  Diet Advancement/Tolerance  Physical Signs/Symptoms Outcomes:  Biochemical Data, Weight, Skin, Nutrition Focused Physical Findings, Hemodynamic Status     Discharge Planning:     Too soon to determine     Electronically signed by Yolis Owusu RD, LD on 4/26/21 at 4:16 PM EDT    Contact: [phone]

## 2021-04-26 NOTE — PROGRESS NOTES
04/26/21 1146   [address]  (heated)   Humidification Source Heated wire   Nitric Oxide/Epoprostenol In Use?  Yes   NO Set 20   NO Analyzed 20 ppm

## 2021-04-26 NOTE — PROGRESS NOTES
[facility]   Progress Note                   Date:   4/26/2021  Patient name: Jeannie Fry  Date of admission:  4/21/2021 12:14 AM  MRN:   [id number]  YOB: 1955  PCP: Natalio Murrell MD    Reason for Admission:      Subjective:       Overnight patient was SVT. Received one dose of lopressor. HR improved to 110 but later again jumped to 150 received one dose of 6mg of adenosine. patient remain intubated and paralyzed, she is sedated on fenatnyl and versed. On 3 pressors support. Vasopressin, levophed and epinephrine    Vitals   /80,  in sinus tacchycardia      Post cardiac arrest echo : Small left ventricular cavity size. hyperdynamic left ventricular function. EF >65%. .Mild mitral regurgitation. Mild TR. RVSP is 29 mmHg. No significant pericardial effusion is seen. Brief History  73 y F admitted with fall found to multiple fractures and femur fracture s.p open reduction. Later pt had PEA arrest with 2 rounds of CPR. We are consulted for post cardiac arrest with EKG changes.      Medications:   Scheduled Meds:   potassium bicarb-citric acid  40 mEq Oral Daily    ipratropium-albuterol  1 ampule Inhalation Q4H    lactated ringers bolus  1,000 mL Intravenous Once    hydrocortisone sodium succinate PF  100 mg Intravenous Q6H    vancomycin  15 mg/kg Intravenous Q24H    sodium chloride flush  5-40 mL Intravenous 2 times per day    piperacillin-tazobactam  4,500 mg Intravenous Q8H    thiamine (VITAMIN B1) IVPB  100 mg Intravenous Q24H    multivitamin  1 tablet Oral Daily    famotidine (PEPCID) injection  20 mg Intravenous Daily    enoxaparin  30 mg Subcutaneous BID    sodium chloride flush  5-40 mL Intravenous 2 times per day    sodium chloride flush  5-40 mL Intravenous 2 times per day    polyethylene glycol  17 g Oral Daily    sennosides-docusate sodium  1 tablet Oral BID       Continuous Infusions:   EPINEPHrine infusion 21 mcg/min (04/26/21 0538) Neurologic: Mental status: Alert, oriented. Motor and sensory not done. EKG: Wide QRS rhythm. Left axis deviation. Right bundle branch block      Echocardiogram: Small left ventricular cavity size. Global hyperdynamic left ventricular function. Estimated ejection fraction is greater than 65%. .  Normal right ventricular size and function. Mild mitral regurgitation. Mild tricuspid regurgitation. Estimated right ventricular systolic pressure is 29 mmHg. No significant pericardial effusion is seen. Coronary Angiography: n/a        Assessment:   1. Patient Active Problem List:  2. PEA arrest likely respiratory due to bilateral lung opacities  3. Mild pulmonary vascular congestion   4. Hypovolemic shock  5. Supracondylar distal femur fracture s/p open reduction with intramedullary knee nail insertion  6. Hypoglycemia  7. Anemia  8. Alcohol withdrawal  9. Hyperammonemia  10. Substance abuse  11. Multiple rib fracture  12. NSTEMI type type II. Likely due to PEA arrest and CPR  13. Cocaine abuser          Treatment Plan:   1. Replace electrolytes   2. Keep potassium above 4 and Mg above 2  3. Elevated troponin and proBNP is likely from the PEA arrest and CPR, anemia  4. Continue supportive management  5. Wean off from pressor  7. No ischemic workup at this point. As her post cardiac echo didn't show any decline of EF. Gennaro Beltre MD  Internal Medicine Resident PGY [place], [facility]   4/26/2021, 8:14 AM    Attending Physician Statement  I have discussed the care of the patient, including pertinent history and exam findings, with the resident. I have seen and examined the patient and the key elements of all parts of the encounter have been performed by me. I agree with the assessment, plan and orders as documented by the resident.   Talked with the family   Tita Roberts MD

## 2021-04-26 NOTE — PROGRESS NOTES
,  Occupational Therapy Not Seen Note    DATE: 2021  Name: Alejandra Fields  : 1955  MRN: [id number]    Patient not available for Occupational Therapy due to:    Pt not appropriate for therapy and needs to remain prone. Pt vented and sedated. Per Liset Mitchell MD note on this date reports the following;  Called to bedside with Dr. Javi Bailey to evaluate Pt who has refractory tachycardia. Pt currently remains  In prone positioning for respiratory distress. Gave 6mg adenosine, with HR improved to 110s. Currently on 3 pressors. Pt remains in critical condition. Per Trauma note from Joey Sanchez MD note on : pt had episodes of refractory tachycardia overnight requiring lopressor and adenosine. Resolved back to HR of 110s. Plan: Family meeting. Attempt high freq oscillation. Wean hydrocortisone if possible. Dc vancomycin. Dc epoprostenol. Monitor urine output & epinephrine requirements; may attempt lasix. Trial of NO & rotation.   No ECMO due to high mortality    Next Scheduled Treatment: 2021    Electronically signed by LAURE Herr on 2021 at 4:05 PM

## 2021-04-26 NOTE — PROGRESS NOTES
Physical Therapy    DATE: 2021    NAME: Senthil Weldon  MRN: [id number]   : 1955      Patient not seen this date for Physical Therapy due to: Other: Pt not appropriate at this time per the RN.       Electronically signed by June Sher PTA on 2021 at 10:42 AM

## 2021-04-26 NOTE — FLOWSHEET NOTE
0305 Pt in SVT - Notified trauma, EKG done  0307 Trauma at bedside  0325 2.5mg Lopressor given - Pt in sinus tach

## 2021-04-26 NOTE — PLAN OF CARE
Nuha Morataya RN  Outcome: Ongoing  4/26/2021 0733 by Adina Mohs, RCP  Outcome: Ongoing  Goal: Oral health is maintained or improved  4/26/2021 1738 by Nuha Morataya RN  Outcome: Ongoing  4/26/2021 0733 by Adina Mohs, RCP  Outcome: Ongoing  Goal: ET tube will be managed safely  4/26/2021 1738 by Nuha Morataya RN  Outcome: Ongoing  4/26/2021 0733 by Adina Mohs, RCP  Outcome: Ongoing  Goal: Ability to express needs and understand communication  4/26/2021 1738 by Nuha Morataya RN  Outcome: Ongoing  4/26/2021 0733 by Adina Mohs, RCP  Outcome: Ongoing  Goal: Mobility/activity is maintained at optimum level for patient  4/26/2021 1738 by Nuha Morataya RN  Outcome: Ongoing  4/26/2021 0733 by Adina Mohs, RCP  Outcome: Ongoing     Problem: SKIN INTEGRITY  Goal: Skin integrity is maintained or improved  4/26/2021 1738 by Nuha Morataya RN  Outcome: Ongoing  4/26/2021 0733 by Adina Mohs, RCP  Outcome: Ongoing

## 2021-04-26 NOTE — PLAN OF CARE
Problem: Skin Integrity:  Goal: Will show no infection signs and symptoms  Description: Will show no infection signs and symptoms  4/26/2021 0733 by Cathy Sarah RCP  Outcome: Ongoing  4/26/2021 0117 by aLw Marlow RN  Outcome: Ongoing  Goal: Absence of new skin breakdown  Description: Absence of new skin breakdown  Outcome: Ongoing     Problem: OXYGENATION/RESPIRATORY FUNCTION  Goal: Patient will maintain patent airway  4/26/2021 0733 by Cathy Sarah, RCP  Outcome: Ongoing  4/26/2021 0054 by Rose Mccabe RCP  Outcome: Ongoing  Goal: Patient will achieve/maintain normal respiratory rate/effort  Description: Respiratory rate and effort will be within normal limits for the patient  4/26/2021 0733 by Cathy Sarah, RCP  Outcome: Ongoing  4/26/2021 0054 by Rose Mccabe RCP  Outcome: Ongoing     Problem: MECHANICAL VENTILATION  Goal: Patient will maintain patent airway  4/26/2021 0733 by Cathy Sarah RCP  Outcome: Ongoing  4/26/2021 0054 by Rose Mccabe RCP  Outcome: Ongoing  Goal: Oral health is maintained or improved  4/26/2021 0733 by Cathy Sarah, RCP  Outcome: Ongoing  4/26/2021 0054 by Rose Mccabe RCP  Outcome: Ongoing  Goal: ET tube will be managed safely  4/26/2021 0733 by Cathy Sarah, RCP  Outcome: Ongoing  4/26/2021 0054 by Rose Mccabe RCP  Outcome: Ongoing  Goal: Ability to express needs and understand communication  4/26/2021 0733 by Cathy Sarah, RCP  Outcome: Ongoing  4/26/2021 0054 by Rose Mccabe RCP  Outcome: Ongoing  Goal: Mobility/activity is maintained at optimum level for patient  4/26/2021 0733 by Cathy Sarah, LIGIAP  Outcome: Ongoing  4/26/2021 0054 by Rose Mccabe RCP  Outcome: Ongoing     Problem: SKIN INTEGRITY  Goal: Skin integrity is maintained or improved  4/26/2021 0733 by Cathy Sarah RCP  Outcome: Ongoing  4/26/2021 0117 by Law Marlow RN  Outcome: Ongoing

## 2021-04-26 NOTE — PROGRESS NOTES
ICU HP NOTE        PATIENT NAME: Mayte Jones  MEDICAL RECORD NO. [id number]  DATE: 4/26/2021    PRIMARY CARE PHYSICIAN: Anuradha Luther MD    HD: # 5    ASSESSMENT    Patient Active Problem List   Diagnosis    Trauma    [facility]racture of right femur [facility] [address]    Blood alcohol level of 120-199 mg/100 ml    Alcohol intoxication ([facility] [address]    Anemia    Fracture of multiple ribs of left side    Fracture of eleventh thoracic vertebra ([facility] [address]    Fracture of first lumbar vertebra ([facility] [address]    Acute postoperative anemia due to expected blood loss    Hyponatremia    Hypocalcemia    Recent unexplained weight loss    Left foot drop    Multiple fractures of pelvis with stable disruption of pelvic ring, initial encounter for closed fracture (HCC)    Osteoporosis    Vitamin D deficiency    Tobacco use    Cocaine use   HPI:  Mayte Jones is a 73 y.o. female that presented to the Emergency Department following a fall from [address] to knees then BUE after mechanically tripping over crack in cement floors. No LOC or head injury, no other injuries reported. Patient could not bear weight on RLE hence called ambulance to ED. No chemical AP/AC. History of osteoporosis. ETOH on board today. History of multiple drug abuse, positive tox for cocaine.      Inj: R femur fx, T11 fx, L1 fx, L rib 8-12Fx    Rib score: 6    4/21: OR R IMN insertion, H&H post op 6.7, 1u pRBC given, post op H&H 7.4 > 6.7, 1u pRBC given post op  4/22: hgb 8.2, medicine consult, CIWA 0, MRI L3 compression deformity, old L1,4,5 compression fx--no OR, NS sign off  4/23: stable, DC planning  4/24: acute change in mental status, cardiac arrest. transfered to ICU    On initial assessment team came to the patient's room and patient was not breathing, pale appearance, no pulses, CPR was started total CPR time about 10 minutes, nonshockable rhythm, 2 rounds of epinephrine and atropine after which we were able to obtain ROSC and patient was started on epi drip and brought to the -N.p.o.    hyperammon  - Lactulose 20g TID--DC'd   - ammonia of 67    Renal  Metabolic acidosis  -Lactate of 17, pH less than 7  - down trending at 15.94   - Bicarb drip at 125cc  -Giving multiple AMPs of bicarb total 11    Bicarb drip 150mEq in LR @ 125    In: 7164.6 [I.V.:6664.6]  Out: 325 [Urine:325]     [place]  - last hour 25-30  -OUP: 0.3 cc/kg/hr 24 hrs;   -0.3 cc/kg/hr last 8 hrs  -Will monitor urine output & epi use; if improving urine output and decrease of epi requirement may attempt lasix    Renal function  -BUN/CR: 14/0.86 (12/0.88)(9/0.70)  -16 BUN/CR ratio    Intake/Output Summary (Last 24 hours) at 4/26/2021 1125  Last data filed at 4/26/2021 1000  Gross per 24 hour   Intake 8111.6 ml   Output 435 ml   Net 7676.6 ml         Hyponatremia  -Sodium of 583--706--362--259--956--915  -known alcoholic  -f/u urine studies    Electrolytes  -Sodium 143, potassium 4.3  -Cl 96; CO2 26  -Mg 2.0  -Ca 6.7  -Phos 3.8        ID  Concern for sepsis on floors, aspiration pneumonia  -White count of 31 before arrival, rechecked at 20  - WBC trending- 6.3 4/25; 44.3 today  -procal 52.72- 4/25  -cortisol pending  - Tmax 99.7  -Vanc & Zosyn day 3  -Cultures f/u    HEME   status post recent procedure, blood loss anemia  -Hemoglobin of 6.8--stable this AM @ 7.7  -8.2-8.2-9.5-9.2-7.7  -Transfused  1 unit   -plts: [phone]-- 127 today    ENDO  Hypoglycemia  - requiring D50 AMPs  - started on D10 @75 --DC'd bc glucose improved  Goal glucose less than 180  TSH pending-- Normal    DVT: Lovenox     CHECKLIST    RASS: 1  RESTRAINTS: b/l wrist  IVF: bicarb with   NUTRITION: NPO  ANTIBIOTICS: vanc/zosyn  GI: NPO  DVT: lovenox   GLYCEMIC CONTROL: d50 when necessary  HOB >45: na    SUBJECTIVE    Jay Shaheen     4/24 acute cardiac arrest, ROSC achieved, on Levophed, x-ray showing aspiration pneumonia, covering Zosyn broad-spectrum concern for sepsis, acute blood loss anemia transfusing 1 unit, acute metabolic acidosis on bicarb drip has gotten 2 A of bicarb. : Had episodes of refractory tachycardia overnight requiring lopressor and adenosine. Resolved back to HR of 110s. Plan: Family meeting. Attempt high freq oscillation. Wean hydrocortisone if possible. Dc vancomycin. Dc epoprostenol. Monitor urine output & epinephrine requirements; may attempt lasix. Trial of NO & rotation. No ECMO due to high mortality. OBJECTIVE  VITALS: Temp: Temp: 98.8 °F (37.1 °C)Temp  Av.1 °F (37.3 °C)  Min: 97.8 °F (36.6 °C)  Max: 99.7 °F (73.1 °C) BP Systolic (89COQ), JASPREET:264 , Min:88 , YKS:167   Diastolic (32HZK), QMX:81, Min:57, Max:84   Pulse Pulse  Av.1  Min: 106  Max: 171 Resp Resp  Av.5  Min: 10  Max: 34 Pulse ox SpO2  Av.5 %  Min: 42 %  Max: 94 %    CONSTITUTIONAL: Limited to intubation and sedation and pronation. HEAD: atraumatic, normocephalic  EYES: nonreactive pupils  ENT: ears are symmetric, nares patent   HEENT: moist mucous membranes  NECK: Supple, symmetrical, trachea midline  LUNGS: bilateral ronchi  CARDIOVASCULAR: +S1/S2  ABDOMEN: soft,  nondistended,   NEUROLOGIC: intubated and sedated  EXTREMITIES:  Surgical changes to right lower extremities. SKIN: normal coloration and turgor      LAB:  CBC:   Recent Labs     21  1529 21  2047 21  0347 21  0452   WBC 20.6*  --  6.3 44.3*   HGB 6.8* 9.5* 9.2* 7.7*   HCT 23.5* 30.4* 29.4* 24.2*   MCV 89.0  --  85.2 84.9     --  198 See Reflexed IPF Result     BMP:   Recent Labs     21  0347 21  1011 21  1845 21  0452     --  143 143   K 3.6*  --  3.6* 4.3   CL 95*  --  96* 96*   CO2 21  --  23 26   BUN 9  --  12 14   CREATININE 0.70 1.52* 0.88 0.86   GLUCOSE 10*  --  81 104*         RADIOLOGY:  Xr Chest (single View Frontal)    Result Date: 2021  No acute cardiopulmonary disease.      Xr Femur Right (min 2 Views)    Result Date: 2021  Postoperative changes from internal fixation of the comminuted distal femoral fracture. Xr Femur Right (min 2 Views)    Result Date: 4/21/2021  Marginal reduction of displacement and overriding with no change in anterior angulation. Xr Femur Right (min 2 Views)    Result Date: 4/21/2021  Distal femur fracture as described with associated moderate knee joint effusion/hemarthrosis. Osteoporosis. Xr Knee Right (3 Views)    Result Date: 4/21/2021  Marginal reduction of displacement and overriding with no change in anterior angulation. Xr Knee Right (3 Views)    Result Date: 4/21/2021  Comminuted distal right femur fracture as described with intra-articular extension. Osteoporosis. Moderate knee joint effusion/hemarthrosis. Xr Tibia Fibula Right (2 Views)    Result Date: 4/21/2021  Distal femur fracture. Please refer to previous reports. Osteoporosis. Old healed distal tibial fracture. 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 Other possibilities include enchondroma or less likely low-grade chondrosarcoma. Correlation with old studies would be most useful. An addendum can be made should they become available. Ct Head Wo Contrast    Result Date: 4/24/2021  Mild central and cortical cerebral atrophy. Mild chronic deep white matter ischemic changes No acute intracranial abnormalities are noted. Ct Head Wo Contrast    Result Date: 4/24/2021  No acute intracranial abnormality. No significant change in brain findings compared to the April 21, 2021 study. RECOMMENDATIONS: If further evaluation is needed, MRI is suggested. Ct Head Wo Contrast    Result Date: 4/21/2021  No acute intracranial abnormality. Ct Cervical Spine Wo Contrast    Result Date: 4/21/2021  No acute abnormality of the cervical spine.      Ct Knee Right Wo Contrast    Result Date: 4/21/2021  Distal right femoral fracture with nondisplaced and possibly incomplete sagittal plane fracture in the distal portion extending to the lateral aspect of the intercondylar notch. Small to moderate hemarthrosis. Sclerotic lesion centrally within the tibial metaphysis most likely representing old bone infarct. Enchondroma is also a consideration. Recommend obtaining old comparison studies to document expected stability. Low-grade chondrosarcoma cannot be entirely excluded but is considered unlikely. Mri Lumbar Spine W Wo Contrast    Result Date: 4/22/2021  Posterior fixation and laminectomy from I2-S6 without complication. Acute/subacute compression deformity at L3. Remote compression deformity at L1, L4, and L5. Multilevel degenerative change with canal stenosis at L3-4 and bilateral foraminal narrowing as described above. Xr Chest Portable    Result Date: 4/24/2021  Diffuse bilateral interstitial airspace disease, new since the prior study from April 21, 2021. Developing pulmonary edema is suspected. Ct Chest Pulmonary Embolism W Contrast    Result Date: 4/24/2021  1. No evidence for pulmonary embolism. 2.  Extensive bilateral airspace disease with ground-glass and scattered consolidative opacities. Trace pleural effusions are also noted. These findings are new since CT exam 3 days ago. These findings may represent multifocal inflammatory process, pulmonary hemorrhage or edema. 3.  Gaseous distention of the stomach. Mri Sacrum Coccyx W Wo Contrast    Result Date: 4/22/2021  1. Acute or subacute nondisplaced fractures of the bilateral sacral alae and posterior right iliac bone. 2. No suspicious marrow space-occupying lesion. Ct Chest Abdomen Pelvis W Contrast    Result Date: 4/21/2021  Acute nondisplaced left rib fractures posteriorly and laterally involving ribs 8 through 12. Ribs 10, 11 and 12 are fractured in 2 locations. There is no evidence overlying soft tissue injury and therefore does not meet graded criteria for chest wall injury. Possible acute compression deformities of mild degree at T11 and L1.  Multiple old healed bilateral rib fractures more so on the left. And multiple old mild anterior wedge compression deformities. Ct Lumbar Spine Trauma Reconstruction    Result Date: 4/21/2021  1. Multilevel thoracolumbar vertebral body remote appearing compression fractures, as discussed above. 2. For clinical suspicion of superimposed acute vertebral compression, recommend MRI thoracic and lumbar spine examinations for further evaluation. 3. L4 through S1 posterior lumbar interbody fusion with L4 vertebral body screws appearing to expand beyond the superior endplate cortex. 4. 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 Cannot exclude association with metastatic disease. Recommend clinical correlation. Ct Thoracic Spine Trauma Reconstruction    Result Date: 4/21/2021  1. Multilevel thoracolumbar vertebral body remote appearing compression fractures, as discussed above. 2. For clinical suspicion of superimposed acute vertebral compression, recommend MRI thoracic and lumbar spine examinations for further evaluation. 3. L4 through S1 posterior lumbar interbody fusion with L4 vertebral body screws appearing to expand beyond the superior endplate cortex. 4. 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 Cannot exclude association with metastatic disease. Recommend clinical correlation. Xr Hip 2-3 Vw W Pelvis Right    Result Date: 4/21/2021  No acute abnormality evident. Posttraumatic changes on the left. Osteoporosis. Postop changes of the lumbar fusion and total left hip arthroplasty. Wayne Campos MD  4/26/2021  11:25 AM              Trauma Attending [facility] [address]      I have reviewed the above [facility] note(s) and confirmed the key elements of the medical history and physical exam. I have seen and examined the pt.   I have discussed the findings, established the care plan and

## 2021-04-27 NOTE — FLOWSHEET NOTE
No RR charted for 7p-7a shift due to pt being on an oscillator vent.  Poor pulse ox waveform, see ABG results for accurate oxygenation

## 2021-04-27 NOTE — PLAN OF CARE
Problem: Pain:  Goal: Pain level will decrease  Description: Pain level will decrease  4/27/2021 1002 by Ivy Cote RN  Outcome: Ongoing  4/27/2021 0630 by Beto Ca RN  Outcome: Ongoing  Goal: Control of acute pain  Description: Control of acute pain  4/27/2021 1002 by Ivy Cote RN  Outcome: Ongoing  4/27/2021 0630 by Beto Ca RN  Outcome: Ongoing  Goal: Control of chronic pain  Description: Control of chronic pain  4/27/2021 1002 by Ivy Cote RN  Outcome: Ongoing  4/27/2021 0630 by Beto Ca RN  Outcome: Ongoing     Problem: Falls - Risk of:  Goal: Will remain free from falls  Description: Will remain free from falls  4/27/2021 1002 by Ivy Cote RN  Outcome: Ongoing  4/27/2021 0630 by Beto Ca RN  Outcome: Ongoing  Goal: Absence of physical injury  Description: Absence of physical injury  4/27/2021 1002 by Ivy Cote RN  Outcome: Ongoing  4/27/2021 0630 by Beto Ca RN  Outcome: Ongoing     Problem: Skin Integrity:  Goal: Will show no infection signs and symptoms  Description: Will show no infection signs and symptoms  4/27/2021 1002 by Ivy Cote RN  Outcome: Ongoing  4/27/2021 0630 by Beto Ca RN  Outcome: Ongoing  Goal: Absence of new skin breakdown  Description: Absence of new skin breakdown  4/27/2021 1002 by Ivy Cote RN  Outcome: Ongoing  4/27/2021 0630 by Beto Ca RN  Outcome: Ongoing     Problem: Musculor/Skeletal Functional Status  Goal: Highest potential functional level  4/27/2021 1002 by Ivy Cote RN  Outcome: Ongoing  4/27/2021 0630 by Beto Ca RN  Outcome: Ongoing     Problem: Nutrition  Goal: Optimal nutrition therapy  Description: Nutrition Problem #1: Unintended weight loss  Intervention: Food and/or Nutrient Delivery: Continue Current Diet, Start Oral Nutrition Supplement  Nutritional Goals: PO intake to meet >75% of estimated nutrition needs      4/27/2021 1002 by Ivy Cote RN  Outcome: Ongoing 4/27/2021 0630 by Jennifer Velazquez RN  Outcome: Ongoing     Problem: OXYGENATION/RESPIRATORY FUNCTION  Goal: Patient will maintain patent airway  4/27/2021 1002 by Chayo Marshall RN  Outcome: Ongoing  4/27/2021 0849 by Ysabel Moreno  Outcome: Ongoing  4/27/2021 0630 by Jennifer Velazquez RN  Outcome: Ongoing  4/26/2021 2013 by Marcos Redd RCP  Outcome: Ongoing  Goal: Patient will achieve/maintain normal respiratory rate/effort  Description: Respiratory rate and effort will be within normal limits for the patient  4/27/2021 1002 by Chayo Marshall RN  Outcome: Ongoing  4/27/2021 0849 by Ysabel Moreno  Outcome: Ongoing  4/27/2021 0630 by Jennifer Velazquez RN  Outcome: Ongoing  4/26/2021 2013 by Marcos Redd RCP  Outcome: Ongoing     Problem: MECHANICAL VENTILATION  Goal: Patient will maintain patent airway  4/27/2021 1002 by Chayo Marshall RN  Outcome: Ongoing  4/27/2021 0849 by Ysabel Moreno  Outcome: Ongoing  4/27/2021 0630 by Jennifer Velazquez RN  Outcome: Ongoing  4/26/2021 2013 by Marcos Redd RCP  Outcome: Ongoing  Goal: Oral health is maintained or improved  4/27/2021 1002 by Chayo Marshall RN  Outcome: Ongoing  4/27/2021 0849 by Ysabel Moreno  Outcome: Ongoing  4/27/2021 0630 by Jennifer Velazquez RN  Outcome: Ongoing  4/26/2021 2013 by Marcos Redd RCP  Outcome: Ongoing  Goal: ET tube will be managed safely  4/27/2021 1002 by Chayo Marshall RN  Outcome: Ongoing  4/27/2021 0849 by Ysabel Moreno  Outcome: Ongoing  4/27/2021 0630 by Jennifer Velazquez RN  Outcome: Ongoing  4/26/2021 2013 by Marcos Redd RCP  Outcome: Ongoing  Goal: Ability to express needs and understand communication  4/27/2021 1002 by Chayo Marshall RN  Outcome: Ongoing  4/27/2021 0849 by Ysabel Moreno  Outcome: Ongoing  4/27/2021 0630 by Jennifer Velazquez RN  Outcome: Ongoing  4/26/2021 2013 by Marcos Redd RCP  Outcome: Ongoing  Goal: Mobility/activity is maintained at optimum level for patient  4/27/2021 1002 by Chayo Marshall RN  Outcome: Ongoing 4/27/2021 0849 by Almetta Kawasaki  Outcome: Ongoing  4/27/2021 0630 by Des Peters RN  Outcome: Ongoing  4/26/2021 2013 by Tone Marques RCP  Outcome: Ongoing     Problem: SKIN INTEGRITY  Goal: Skin integrity is maintained or improved  4/27/2021 1002 by Diya Quintanilla RN  Outcome: Ongoing  4/27/2021 0849 by Almetta Kawasaki  Outcome: Ongoing  4/27/2021 0630 by Des Peters RN  Outcome: Ongoing

## 2021-04-27 NOTE — PLAN OF CARE
Problem: Pain:  Goal: Pain level will decrease  Description: Pain level will decrease  4/27/2021 0630 by Eloise Earl RN  Outcome: Ongoing  4/26/2021 1738 by Marcin Campuzano RN  Outcome: Ongoing  Goal: Control of acute pain  Description: Control of acute pain  4/27/2021 0630 by Eloise Earl RN  Outcome: Ongoing  4/26/2021 1738 by Marcin Campuzano RN  Outcome: Ongoing  Goal: Control of chronic pain  Description: Control of chronic pain  4/27/2021 0630 by Eloise Earl RN  Outcome: Ongoing  4/26/2021 1738 by Marcin Campuzano RN  Outcome: Ongoing     Problem: Falls - Risk of:  Goal: Will remain free from falls  Description: Will remain free from falls  4/27/2021 0630 by Eloise Earl RN  Outcome: Ongoing  4/26/2021 1738 by Marcin Campuzano RN  Outcome: Ongoing  Goal: Absence of physical injury  Description: Absence of physical injury  4/27/2021 0630 by Eloise Earl RN  Outcome: Ongoing  4/26/2021 1738 by Marcin Campuzano RN  Outcome: Ongoing     Problem: Skin Integrity:  Goal: Will show no infection signs and symptoms  Description: Will show no infection signs and symptoms  4/27/2021 0630 by Eloise Earl RN  Outcome: Ongoing  4/26/2021 1738 by Marcin Campuzano RN  Outcome: Ongoing  Goal: Absence of new skin breakdown  Description: Absence of new skin breakdown  4/27/2021 0630 by Eloise Earl RN  Outcome: Ongoing  4/26/2021 1738 by Marcin Campuzano RN  Outcome: Ongoing     Problem: Musculor/Skeletal Functional Status  Goal: Highest potential functional level  4/27/2021 0630 by Eloise Earl RN  Outcome: Ongoing  4/26/2021 1738 by Marcin Campuzano RN  Outcome: Ongoing     Problem: Nutrition  Goal: Optimal nutrition therapy  Description: Nutrition Problem #1: Unintended weight loss  Intervention: Food and/or Nutrient Delivery: Continue Current Diet, Start Oral Nutrition Supplement  Nutritional Goals: PO intake to meet >75% of estimated nutrition needs      4/27/2021 0630 by Issa Kennedy RN  Outcome: Ongoing  4/26/2021 1738 by Nick Wright RN  Outcome: Ongoing     Problem: OXYGENATION/RESPIRATORY FUNCTION  Goal: Patient will maintain patent airway  4/27/2021 0630 by Issa Kennedy RN  Outcome: Ongoing  4/26/2021 2013 by Nancy Lam RCP  Outcome: Ongoing  4/26/2021 1738 by Nick Wright RN  Outcome: Ongoing  Goal: Patient will achieve/maintain normal respiratory rate/effort  Description: Respiratory rate and effort will be within normal limits for the patient  4/27/2021 0630 by Issa Kennedy RN  Outcome: Ongoing  4/26/2021 2013 by Nancy Lam RCP  Outcome: Ongoing  4/26/2021 1738 by Nick Wright RN  Outcome: Ongoing     Problem: MECHANICAL VENTILATION  Goal: Patient will maintain patent airway  4/27/2021 0630 by Issa Kennedy RN  Outcome: Ongoing  4/26/2021 2013 by Nancy Lam RCP  Outcome: Ongoing  4/26/2021 1738 by Nick Wright RN  Outcome: Ongoing  Goal: Oral health is maintained or improved  4/27/2021 0630 by Issa Kennedy RN  Outcome: Ongoing  4/26/2021 2013 by Nancy Lam RCP  Outcome: Ongoing  4/26/2021 1738 by Nick Wright RN  Outcome: Ongoing  Goal: ET tube will be managed safely  4/27/2021 0630 by Issa Kennedy RN  Outcome: Ongoing  4/26/2021 2013 by Nancy Lam RCP  Outcome: Ongoing  4/26/2021 1738 by Nick Wright RN  Outcome: Ongoing  Goal: Ability to express needs and understand communication  4/27/2021 0630 by Issa Kennedy RN  Outcome: Ongoing  4/26/2021 2013 by Nancy Lam RCP  Outcome: Ongoing  4/26/2021 1738 by Nick Wright RN  Outcome: Ongoing  Goal: Mobility/activity is maintained at optimum level for patient  4/27/2021 0630 by Issa Kennedy RN  Outcome: Ongoing  4/26/2021 2013 by Nancy Lam RCP  Outcome: Ongoing  4/26/2021 1738 by Renaye Adriana, RN  Outcome: Ongoing     Problem: SKIN INTEGRITY  Goal: Skin integrity is maintained or improved  4/27/2021 0630 by Issa Esters, RN  Outcome: Ongoing  4/26/2021 1738 by Chandu Jiang RN  Outcome: Ongoing

## 2021-04-27 NOTE — PROGRESS NOTES
the trauma ICU for further care, CT chest did not show any pulmonary embolism, CT head was unremarkable. MEDICAL DECISION MAKING AND PLAN    Neuro  -CT head negative    Intubated and sedated, paralyzed   - fentanyl 200, off versed   - nimbex 1       Cards    HR: 122  MAP: 77    Post cardiac arrest, PEA arrest  -Multiple rounds of CPR, ROSC achieved started on epi drip  -cards following, no ischemic work up  - trops downtrending (stopped checking)  - BNP up trending at 45K, stopped check, per cards 2/2 to CPR   - post arrest echo normal EF, hyperdynamic    Hypotensive  - MAP goal 65  - Levo - 50  - vaso - 0.04  - Epi - 5 ( increasing requirement)       Pulm  Acute respiratory arrest, ARDS - intubated, sedated, and paralyzed   -Vent settings:  HFOV 30, 390, 16, 100%  -ABGs: 7.394, 44, 206 : 206!    -CXR: PENDING   -High frequency oscillation Mode---consider change   -Epoprostenol and NO tried without improvement     Aspiration PMN  - zosyn day4 --dc'ing vanc  - 100mg q8    GI  Gastric ulcer prophylaxis  -Pepcid    Nutrition  -N.p.o.     hyperammon  - Lactulose 20g TID--DC'd   - ammonia of 67  - no cirrhosis on CT    Renal  Renal function  -BUN/CR: 14/0.86 ---17/1.07    Electrolytes  - na146, k4.4, mg2, DV8.3    Metabolic acidosis  -Lactate >20  - Bicarb drip at 75  -Giving multiple AMPs of bicarb total 11    Fluids: Bicarb drip 150mEq in LR @ 75, D10 at 50    Oligouria  - last hour 15  -OUP: 0.3 cc/kg/hr 24 hrs;   - lasix 40 yesterday no effect  - lasix 40 again today  - watch urine output    In: 6255 [I.V.:6215; NG/GT:40]  Out: 635 [Urine:385]     Intake/Output Summary (Last 24 hours) at 4/27/2021 0651  Last data filed at 4/27/2021 0500  Gross per 24 hour   Intake 6255 ml   Output 635 ml   Net 5620 ml         ID  Concern for sepsis on floors, aspiration pneumonia  - Zosyn day 4  - WBC 44 --> PENDING  - fever overnight 101.8  - pro aguilar 52 yesterday  - consider ID consult       HEME   status post recent procedure, 7.3*   HCT 29.4* 24.2* 23.9*   MCV 85.2 84.9 87.2    See Reflexed IPF Result See Reflexed IPF Result     BMP:   Recent Labs     04/25/21  1845 04/26/21  0452 04/27/21  0448    143 146*   K 3.6* 4.3 4.4   CL 96* 96* 94*   CO2 23 26 19*   BUN 12 14 17   CREATININE 0.88 0.86 1.07*   GLUCOSE 81 104* 103*         RADIOLOGY:  Xr Chest (single View Frontal)    Result Date: 4/22/2021  No acute cardiopulmonary disease. Xr Femur Right (min 2 Views)    Result Date: 4/21/2021  Postoperative changes from internal fixation of the comminuted distal femoral fracture. Xr Femur Right (min 2 Views)    Result Date: 4/21/2021  Marginal reduction of displacement and overriding with no change in anterior angulation. Xr Femur Right (min 2 Views)    Result Date: 4/21/2021  Distal femur fracture as described with associated moderate knee joint effusion/hemarthrosis. Osteoporosis. Xr Knee Right (3 Views)    Result Date: 4/21/2021  Marginal reduction of displacement and overriding with no change in anterior angulation. Xr Knee Right (3 Views)    Result Date: 4/21/2021  Comminuted distal right femur fracture as described with intra-articular extension. Osteoporosis. Moderate knee joint effusion/hemarthrosis. Xr Tibia Fibula Right (2 Views)    Result Date: 4/21/2021  Distal femur fracture. Please refer to previous reports. Osteoporosis. Old healed distal tibial fracture. 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 Other possibilities include enchondroma or less likely low-grade chondrosarcoma. Correlation with old studies would be most useful. An addendum can be made should they become available. Ct Head Wo Contrast    Result Date: 4/24/2021  Mild central and cortical cerebral atrophy. Mild chronic deep white matter ischemic changes No acute intracranial abnormalities are noted. Ct Head Wo Contrast    Result Date: 4/24/2021  No acute intracranial abnormality.  No significant change in brain findings compared to the April 21, 2021 study. RECOMMENDATIONS: If further evaluation is needed, MRI is suggested. Ct Head Wo Contrast    Result Date: 4/21/2021  No acute intracranial abnormality. Ct Cervical Spine Wo Contrast    Result Date: 4/21/2021  No acute abnormality of the cervical spine. Ct Knee Right Wo Contrast    Result Date: 4/21/2021  Distal right femoral fracture with nondisplaced and possibly incomplete sagittal plane fracture in the distal portion extending to the lateral aspect of the intercondylar notch. Small to moderate hemarthrosis. Sclerotic lesion centrally within the tibial metaphysis most likely representing old bone infarct. Enchondroma is also a consideration. Recommend obtaining old comparison studies to document expected stability. Low-grade chondrosarcoma cannot be entirely excluded but is considered unlikely. Mri Lumbar Spine W Wo Contrast    Result Date: 4/22/2021  Posterior fixation and laminectomy from J8-J1 without complication. Acute/subacute compression deformity at L3. Remote compression deformity at L1, L4, and L5. Multilevel degenerative change with canal stenosis at L3-4 and bilateral foraminal narrowing as described above. Xr Chest Portable    Result Date: 4/24/2021  Diffuse bilateral interstitial airspace disease, new since the prior study from April 21, 2021. Developing pulmonary edema is suspected. Ct Chest Pulmonary Embolism W Contrast    Result Date: 4/24/2021  1. No evidence for pulmonary embolism. 2.  Extensive bilateral airspace disease with ground-glass and scattered consolidative opacities. Trace pleural effusions are also noted. These findings are new since CT exam 3 days ago. These findings may represent multifocal inflammatory process, pulmonary hemorrhage or edema. 3.  Gaseous distention of the stomach. Mri Sacrum Coccyx W Wo Contrast    Result Date: 4/22/2021  1.  Acute or subacute nondisplaced fractures of the bilateral sacral alae and posterior right iliac bone. 2. No suspicious marrow space-occupying lesion. Ct Chest Abdomen Pelvis W Contrast    Result Date: 4/21/2021  Acute nondisplaced left rib fractures posteriorly and laterally involving ribs 8 through 12. Ribs 10, 11 and 12 are fractured in 2 locations. There is no evidence overlying soft tissue injury and therefore does not meet graded criteria for chest wall injury. Possible acute compression deformities of mild degree at T11 and L1. Multiple old healed bilateral rib fractures more so on the left. And multiple old mild anterior wedge compression deformities. Ct Lumbar Spine Trauma Reconstruction    Result Date: 4/21/2021  1. Multilevel thoracolumbar vertebral body remote appearing compression fractures, as discussed above. 2. For clinical suspicion of superimposed acute vertebral compression, recommend MRI thoracic and lumbar spine examinations for further evaluation. 3. L4 through S1 posterior lumbar interbody fusion with L4 vertebral body screws appearing to expand beyond the superior endplate cortex. 4. 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 Cannot exclude association with metastatic disease. Recommend clinical correlation. Ct Thoracic Spine Trauma Reconstruction    Result Date: 4/21/2021  1. Multilevel thoracolumbar vertebral body remote appearing compression fractures, as discussed above. 2. For clinical suspicion of superimposed acute vertebral compression, recommend MRI thoracic and lumbar spine examinations for further evaluation. 3. L4 through S1 posterior lumbar interbody fusion with L4 vertebral body screws appearing to expand beyond the superior endplate cortex. 4. 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 Cannot exclude association with metastatic disease.   Recommend clinical

## 2021-04-27 NOTE — PLAN OF CARE
Problem: OXYGENATION/RESPIRATORY FUNCTION  Goal: Patient will maintain patent airway  4/27/2021 0849 by Dejon Nunez  Outcome: Ongoing     Problem: OXYGENATION/RESPIRATORY FUNCTION  Goal: Patient will achieve/maintain normal respiratory rate/effort  Description: Respiratory rate and effort will be within normal limits for the patient  4/27/2021 0849 by Dejon Nunez  Outcome: Ongoing     Problem: MECHANICAL VENTILATION  Goal: Patient will maintain patent airway  4/27/2021 0849 by Dejon Nunez  Outcome: Ongoing     Problem: MECHANICAL VENTILATION  Goal: Oral health is maintained or improved  4/27/2021 0849 by Dejon Nunez  Outcome: Ongoing     Problem: MECHANICAL VENTILATION  Goal: ET tube will be managed safely  4/27/2021 0849 by Dejon Nunez  Outcome: Ongoing     Problem: MECHANICAL VENTILATION  Goal: Ability to express needs and understand communication  4/27/2021 0849 by Dejon Nunez  Outcome: Ongoing     Problem: MECHANICAL VENTILATION  Goal: Mobility/activity is maintained at optimum level for patient  4/27/2021 0849 by Dejon Nunez  Outcome: Ongoing     Problem: SKIN INTEGRITY  Goal: Skin integrity is maintained or improved  4/27/2021 0849 by Dejon Nunez  Outcome: Ongoing

## 2021-04-27 NOTE — ACP (ADVANCE CARE PLANNING)
Advance Care Planning     General Advance Care Planning (ACP) Conversation    Date of Conversation: 4/21/2021  Conducted with: Patient with Decision Making Capacity   Healthcare Decision Maker: Next of Kin by law (only applies in absence of above) (name) daughters    Healthcare Decision Maker:        Click here to [address] including selection of the Healthcare Decision Maker Relationship (ie \"Primary\")  Today we documented Decision Maker(s) consistent with Legal Next of Kin hierarchy. Content/Action Overview:   Has ACP document(s) on file - reflects the patient's care preferences  Reviewed DNR/DNI and patient elects DNR order - referred to ACP Clinical Specialist & placed order  benefit/burden of treatment options, resuscitation preferences and end of life care preferences (vegetative state/imminent death)  remaning sibling to arrive and consider DNR CC   CONVERSATION WAS PERFORMED by DR General Cabezas AT BEDSIDE    Length of Voluntary ACP Conversation in minutes:  16 minutes    [address] [facility],[address]

## 2021-04-27 NOTE — PROGRESS NOTES
[facility]   Progress Note                   Date:   4/27/2021  Patient name: Kevon Arvizu  Date of admission:  4/21/2021 12:14 AM  MRN:   [id number]  YOB: 1955  PCP: Trista Luna MD    Reason for Admission:      Subjective:         patient remain intubated and paralyzed, she is sedated on fenatnyl and versed. On 3 pressors support. Vasopressin, levophed and epinephrine. Vitals   /80,  in sinus tachycardia    Troponin jumped up to 140> 177>208      Post cardiac arrest echo : Small left ventricular cavity size. hyperdynamic left ventricular function. EF >65%. .Mild mitral regurgitation. Mild TR. RVSP is 29 mmHg. No significant pericardial effusion is seen. Brief History  73 y F admitted with fall found to multiple fractures and femur fracture s.p open reduction. Later pt had PEA arrest with 2 rounds of CPR. We are consulted for post cardiac arrest with EKG changes.      Medications:   Scheduled Meds:   calcium gluconate-NaCl  1,000 mg Intravenous Once    hydrocortisone sodium succinate PF  50 mg Intravenous Q8H    potassium bicarb-citric acid  40 mEq Oral Daily    sodium chloride flush  5-40 mL Intravenous 2 times per day    piperacillin-tazobactam  4,500 mg Intravenous Q8H    thiamine (VITAMIN B1) IVPB  100 mg Intravenous Q24H    multivitamin  1 tablet Oral Daily    famotidine (PEPCID) injection  20 mg Intravenous Daily    enoxaparin  30 mg Subcutaneous BID    sodium chloride flush  5-40 mL Intravenous 2 times per day    sodium chloride flush  5-40 mL Intravenous 2 times per day    polyethylene glycol  17 g Oral Daily    sennosides-docusate sodium  1 tablet Oral BID       Continuous Infusions:   dextrose 50 mL/hr at 04/27/21 0700    EPINEPHrine infusion 5 mcg/min (04/27/21 0739)    cisatracurium (NIMBEX) infusion 1 mcg/kg/min (04/26/21 1038)    dextrose Stopped (04/27/21 0700)    midazolam Stopped (04/27/21 3416)    phenylephrine (JENN-SYNEPHRINE) 50mg/250mL infusion Stopped (04/25/21 1157)    IV infusion builder 75 mL/hr at 04/27/21 0859    propofol Stopped (04/25/21 2217)    norepinephrine 54 mcg/min (04/27/21 0856)    sodium chloride      fentaNYL 200 mcg/hr (04/27/21 0415)    vasopressin (Septic Shock) infusion 0.04 Units/min (04/27/21 0057)       CBC:   Recent Labs     04/25/21  0347 04/26/21  0452 04/27/21  0448   WBC 6.3 44.3* 66.6*   HGB 9.2* 7.7* 7.3*    See Reflexed IPF Result See Reflexed IPF Result     BMP:    Recent Labs     04/25/21  1845 04/26/21 0452 04/27/21  0448    143 146*   K 3.6* 4.3 4.4   CL 96* 96* 94*   CO2 23 26 19*   BUN 12 14 17   CREATININE 0.88 0.86 1.07*   GLUCOSE 81 104* 103*     Hepatic:   Recent Labs     04/24/21  1529 04/25/21  0347 04/26/21  0820   * 101* 112*   ALT 30 22 29   BILITOT 0.76 1.95* 2.26*   ALKPHOS 235* 135* 82     Troponin: No results for input(s): TROPONINI in the last 72 hours. BNP: No results for input(s): BNP in the last 72 hours. Lipids: No results for input(s): CHOL, HDL in the last 72 hours. Invalid input(s): LDLCALCU  INR: No results for input(s): INR in the last 72 hours. Objective:   Vitals: BP (!) 96/55   Pulse 123   Temp 101.8 °F (38.8 °C)   Resp (!) 0   Ht 5' 1\" (1.549 m)   Wt 119 lb (54 kg)   SpO2 (!) 75%   BMI 22.48 kg/m²     General appearance: awake, alert, in no apparent respiratory distress   HEENT: Head: Normocephalic, no lesions, without obvious abnormality  Neck: no JVD  Lungs: clear to auscultation bilaterally, no basilar rales, no wheezing   Heart: regular rate and rhythm, S1, S2 normal, no murmur, click, rub or gallop  Abdomen: soft, non-tender; bowel sounds normal  Extremities: No LE edema  Neurologic: Mental status: Alert, oriented. Motor and sensory not done. EKG: Wide QRS rhythm. Left axis deviation. Right bundle branch block      Echocardiogram: Small left ventricular cavity size.   Global hyperdynamic left ventricular function. Estimated ejection fraction is greater than 65%. .  Normal right ventricular size and function. Mild mitral regurgitation. Mild tricuspid regurgitation. Estimated right ventricular systolic pressure is 29 mmHg. No significant pericardial effusion is seen. Coronary Angiography: n/a        Assessment:   1. Patient Active Problem List:  2. PEA arrest likely respiratory due to bilateral lung opacities  3. Mild pulmonary vascular congestion   4. Hypovolemic shock  5. Supracondylar distal femur fracture s/p open reduction with intramedullary knee nail insertion  6. Hypoglycemia  7. Anemia  8. Alcohol withdrawal  9. Hyperammonemia  10. Substance abuse  11. Multiple rib fracture  12. NSTEMI type type II. Likely due to PEA arrest and CPR  13. Cocaine abuser          Treatment Plan:   1. Replace electrolytes   2. Keep potassium above 4 and Mg above 2  3. Elevated troponin and proBNP is likely from the PEA arrest and CPR, anemia  4. Continue supportive management  5. Wean off from pressor  7. No ischemic workup at this point. As her post cardiac echo didn't show any decline of EF.          Tiago Pickens MD  Internal Medicine Resident PGY [facility]   4/27/2021, 9:06 AM

## 2021-04-27 NOTE — CARE COORDINATION
Transitional Planning     Patient currently on 3 pressors and on oscillator vent @ 100% FiO2. O2 saturation at 74%. Will speak with family when patient condition is more stable.

## 2021-04-27 NOTE — PROGRESS NOTES
Physical Therapy    DATE: 2021    NAME: Clarisa Charles  MRN: [id number]   : 1955      Patient not seen this date for Physical Therapy due to: Other: Pt not appropriate at this time per the RN.       Electronically signed by Cata Kimble PTA on 2021 at 9:44 AM

## 2021-04-27 NOTE — PROGRESS NOTES
04/27/21 1543   Vent Patient Data   Mean Airway Pressure 30 cmH20     Decreased per trauma.  ABG to follow in 1hr.

## 2021-04-27 NOTE — PROGRESS NOTES
Notified Dr. Antony Medicine at 429 about patient going into [facility], no new orders at this time. Patient converted back to sinus tach     No RR charted for 7a-7p shift due to patient being on oscillator vent. Poor pulse ox waveform, See current ABG results for accurate oxygenation.
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
## 2021-04-27 NOTE — PLAN OF CARE
Problem: OXYGENATION/RESPIRATORY FUNCTION  Goal: Patient will maintain patent airway  4/26/2021 2013 by Kenneth Alejandra RCP  Outcome: Ongoing     Problem: OXYGENATION/RESPIRATORY FUNCTION  Goal: Patient will achieve/maintain normal respiratory rate/effort  Description: Respiratory rate and effort will be within normal limits for the patient  4/26/2021 2013 by Kenneth Alejandra RCP  Outcome: Ongoing     Problem: MECHANICAL VENTILATION  Goal: Patient will maintain patent airway  4/26/2021 2013 by Kenneth Alejandra RCP  Outcome: Ongoing     Problem: MECHANICAL VENTILATION  Goal: Oral health is maintained or improved  4/26/2021 2013 by Kenneth Alejandra RCP  Outcome: Ongoing     Problem: MECHANICAL VENTILATION  Goal: ET tube will be managed safely  4/26/2021 2013 by Kenneth Alejandra RCP  Outcome: Ongoing     Problem: MECHANICAL VENTILATION  Goal: Ability to express needs and understand communication  4/26/2021 2013 by Kenneth Alejandra RCP  Outcome: Ongoing     Problem: MECHANICAL VENTILATION  Goal: Mobility/activity is maintained at optimum level for patient  4/26/2021 2013 by Kenneth Alejandra RCP  Outcome: Ongoing

## 2021-04-27 NOTE — PLAN OF CARE
Problem: OXYGENATION/RESPIRATORY FUNCTION  Goal: Patient will maintain patent airway  4/27/2021 1936 by Violeta Renner  Outcome: Ongoing  4/27/2021 1002 by Ana Wallace RN  Outcome: Ongoing  4/27/2021 0849 by Aline Roman  Outcome: Ongoing  4/27/2021 0630 by Mack Terrazas RN  Outcome: Ongoing     Problem: OXYGENATION/RESPIRATORY FUNCTION  Goal: Patient will achieve/maintain normal respiratory rate/effort  Description: Respiratory rate and effort will be within normal limits for the patient  4/27/2021 1936 by Violeta Renner  Outcome: Ongoing  4/27/2021 1002 by Ana Wallace RN  Outcome: Ongoing  4/27/2021 0849 by Aline Roman  Outcome: Ongoing  4/27/2021 0630 by Mack Terrazas RN  Outcome: Ongoing     Problem: MECHANICAL VENTILATION  Goal: Patient will maintain patent airway  4/27/2021 1936 by Violeta Renner  Outcome: Ongoing  4/27/2021 1002 by Ana Wallace RN  Outcome: Ongoing  4/27/2021 0849 by Aline Roman  Outcome: Ongoing  4/27/2021 0630 by Mack Terrazas RN  Outcome: Ongoing     Problem: MECHANICAL VENTILATION  Goal: Oral health is maintained or improved  4/27/2021 1936 by Violeta Renner  Outcome: Ongoing  4/27/2021 1002 by Ana Wallace RN  Outcome: Ongoing  4/27/2021 0849 by Aline Roman  Outcome: Ongoing  4/27/2021 0630 by Mack Terrazas RN  Outcome: Ongoing     Problem: MECHANICAL VENTILATION  Goal: ET tube will be managed safely  4/27/2021 1936 by Violeta Renner  Outcome: Ongoing  4/27/2021 1002 by Ana Wallace RN  Outcome: Ongoing  4/27/2021 0849 by Aline Roman  Outcome: Ongoing  4/27/2021 0630 by Mack Terrazas RN  Outcome: Ongoing     Problem: MECHANICAL VENTILATION  Goal: Mobility/activity is maintained at optimum level for patient  4/27/2021 1936 by Violeta Renner  Outcome: Ongoing  4/27/2021 1002 by Ana Wallace RN  Outcome: Ongoing  4/27/2021 0849 by Aline Roman  Outcome: Ongoing  4/27/2021 0630 by Mack Smolder, RN  Outcome: Ongoing     Problem: SKIN INTEGRITY

## 2021-04-28 NOTE — FLOWSHEET NOTE
No RR due to pt being on an Oscillator vent.  Due to poor perfusion pulse ox reading is inaccurate, see ABG results for oxygenation

## 2021-04-28 NOTE — PROGRESS NOTES
Patient extubated per physician order. Patient extubated in usual fashion. Patient placed on  room air.      Kirit Ellis   5:09 PM

## 2021-04-28 NOTE — ACP (ADVANCE CARE PLANNING)
Advance Care Planning     General Advance Care Planning (ACP) Conversation    Date of Conversation: 4/21/2021  Conducted with: Patient with Decision Making Capacity   Healthcare Decision Maker: Next of Kin by law (only applies in absence of above) (name) daughters    Healthcare Decision Maker:    Family will move toward terminal extubation. Cap treatment and await further family for withdrawal of care    Click here to complete [address] including selection of the [address] Relationship (ie \"Primary\")  Today we documented Decision Maker(s) consistent with Legal Next of Kin hierarchy.     Content/Action Overview:  DNR CC  Reviewed DNR/DNI and patient elects DNR order - referred to ACP Clinical Specialist & placed order  treatment goals, benefit/burden of treatment options and end of life care preferences (vegetative state/imminent death)  DNR CC    Length of Voluntary ACP Conversation in minutes:  25 minutes    [address] [facility]

## 2021-04-28 NOTE — PLAN OF CARE
Problem: Pain:  Goal: Pain level will decrease  Description: Pain level will decrease  Outcome: Ongoing  Goal: Control of acute pain  Description: Control of acute pain  Outcome: Ongoing  Goal: Control of chronic pain  Description: Control of chronic pain  Outcome: Ongoing     Problem: Falls - Risk of:  Goal: Will remain free from falls  Description: Will remain free from falls  Outcome: Ongoing  Goal: Absence of physical injury  Description: Absence of physical injury  Outcome: Ongoing     Problem: Skin Integrity:  Goal: Will show no infection signs and symptoms  Description: Will show no infection signs and symptoms  Outcome: Ongoing  Goal: Absence of new skin breakdown  Description: Absence of new skin breakdown  Outcome: Ongoing     Problem: Musculor/Skeletal Functional Status  Goal: Highest potential functional level  Outcome: Ongoing     Problem: Nutrition  Goal: Optimal nutrition therapy  Description: Nutrition Problem #1: Unintended weight loss  Intervention: Food and/or Nutrient Delivery: Continue Current Diet, Start Oral Nutrition Supplement  Nutritional Goals: PO intake to meet >75% of estimated nutrition needs      Outcome: Ongoing     Problem: OXYGENATION/RESPIRATORY FUNCTION  Goal: Patient will maintain patent airway  4/28/2021 0249 by Dorinda Booth RN  Outcome: Ongoing  4/27/2021 1936 by Vale Castro  Outcome: Ongoing  Goal: Patient will achieve/maintain normal respiratory rate/effort  Description: Respiratory rate and effort will be within normal limits for the patient  4/28/2021 0249 by Dorinda Booth RN  Outcome: Ongoing  4/27/2021 1936 by Vale Castro  Outcome: Ongoing     Problem: MECHANICAL VENTILATION  Goal: Patient will maintain patent airway  4/28/2021 0249 by Dorinda Booth RN  Outcome: Ongoing  4/27/2021 1936 by Vale Castro  Outcome: Ongoing  Goal: Oral health is maintained or improved  4/28/2021 0249 by Dorinda Booth RN  Outcome: Ongoing  4/27/2021 1936 by Chelsey Walter Addi  Outcome: Ongoing  Goal: ET tube will be managed safely  4/28/2021 0249 by Aspen Hahn RN  Outcome: Ongoing  4/27/2021 1936 by Yasmeen Washington  Outcome: Ongoing  Goal: Ability to express needs and understand communication  4/28/2021 0249 by Aspen Hahn RN  Outcome: Ongoing  4/27/2021 1936 by Yasmeen Washington  Outcome: Ongoing  Goal: Mobility/activity is maintained at optimum level for patient  4/28/2021 0249 by Aspen Hahn RN  Outcome: Ongoing  4/27/2021 1936 by Yasmeen Washington  Outcome: Ongoing     Problem: SKIN INTEGRITY  Goal: Skin integrity is maintained or improved  4/28/2021 0249 by Aspen Hahn RN  Outcome: Ongoing  4/27/2021 1936 by Yasmeen Washington  Outcome: Ongoing

## 2021-04-28 NOTE — FLOWSHEET NOTE
Dr. Brianne Sandoval notified of dampened Art line wave form and difficulty pulling blood from line.  Also notified that RN can no longer hear patient's post tibial pulses

## 2021-04-28 NOTE — PROGRESS NOTES
ICU HP NOTE        PATIENT NAME: Alejandra Fields  MEDICAL RECORD NO. [id number]  DATE: 4/28/2021    PRIMARY CARE PHYSICIAN: Xavier Steiner MD    HD: # 7    ASSESSMENT    Patient Active Problem List   Diagnosis    Trauma    Fracture of right femur ([address]    Blood alcohol level of 120-199 mg/100 ml    Alcohol intoxication ([address]    Anemia    Fracture of multiple ribs of left side    Fracture of eleventh thoracic vertebra ([address]    Fracture of first lumbar vertebra ([address]    Acute postoperative anemia due to expected blood loss    Hyponatremia    Hypocalcemia    Recent unexplained weight loss    Left foot drop    Multiple fractures of pelvis with stable disruption of pelvic ring, initial encounter for closed fracture (HCC)    Osteoporosis    Vitamin D deficiency    Tobacco use    Cocaine use   HPI:    Alejandra Fields is a 73 y.o. female that presented to the Emergency Department following a fall from [facility] to knees then BUE after mechanically tripping over crack in cement floors. No LOC or head injury, no other injuries reported. Patient could not bear weight on RLE hence called ambulance to ED. No chemical AP/AC. History of osteoporosis. ETOH on board today. History of multiple drug abuse, positive tox for cocaine.      Inj: R femur fx, T11 fx, L1 fx, L rib 8-12Fx    Rib score: 6    4/21: OR R IMN insertion, H&H post op 6.7, 1u pRBC given, post op H&H 7.4 > 6.7, 1u pRBC given post op  4/22: hgb 8.2, medicine consult, CIWA 0, MRI L3 compression deformity, old L1,4,5 compression fx--no OR, NS sign off  4/23: stable, DC planning  4/24: acute change in mental status, cardiac arrest. transfered to ICU    On initial assessment team came to the patient's room and patient was not breathing, pale appearance, no pulses, CPR was started total CPR time about 10 minutes, nonshockable rhythm, 2 rounds of epinephrine and atropine after which we were able to obtain ROSC and patient was started on epi drip and brought to the trauma ICU for further care, CT chest did not show any pulmonary embolism, CT head was unremarkable. MEDICAL DECISION MAKING AND PLAN    Neuro  -CT head negative 4/24    Intubated and sedated, paralyzed   - fentanyl 200  -Versed gtt @ 2  - nimbex 1       Cards    HR: 122  MAP: 66    Post cardiac arrest, PEA arrest  -Multiple rounds of CPR, ROSC achieved started on epi drip  -cards following, no ischemic work up  - trops downtrending (stopped checking)  - BNP up trending at 45K, stopped check, per cards 2/2 to CPR   - post arrest echo normal EF, hyperdynamic    Hypotensive  - MAP goal 65  - Levo - 70  - vaso - 0.04  - Epi - 5 ( increasing requirement)   -Acidotic environment requiring sodium bicarbonate, likely increasing pressor requirement due to protein denaturing. Pulm  Acute respiratory arrest, ARDS - intubated, sedated, and paralyzed   -Vent settings:  HFOV 30, 90, 30, 100%  -ABGs: 7.150 pH/ 160.1pO2/35.9 pCO2/ 12. 5HCO3/ >20.0 Lactic acid  -PF ratio:  160  -CXR: no significant improvement   -High frequency oscillation Mode  -Epoprostenol and NO tried without improvement   -Pronation attempted    Aspiration PMN  - zosyn day5 --vancomycin dc'd  - 50mg q8 solu-cortef    GI  Gastric ulcer prophylaxis  -Pepcid    Nutrition  -N.p.o.     hyperammon  - Lactulose 20g TID--DC'd   - ammonia of 67  - no cirrhosis on CT    Renal  Renal function  -BUN/CR: 20/1.78 (12/1.07)(14/0.86)  -JOSE:  -Cr increased to 1.78 from 1.07 from 0.86  -Lovenox hold;    Electrolytes  - na 141, k5.2, mg2.0, ca6.7 phos 6.4   -  1g CaCl ordered for borderline hyperkalemia; no EKG due to HFOV    Metabolic acidosis  -Lactate >20  - Bicarb drip at 75  -Multiple amps of bicarbonate given; 10+  -pH today 7.150 --> 1 amp bicarb given to increase pH to support pressors    Fluids: Bicarb drip 150mEq in D5 @ 75    Oligouria  -OUP:45 over 24 hrs  - lasix 40 4/26 no effect  - lasix 40 again 4/27   - watch urine output    In: 0407.4 [I.V.:3377.4]  Out: 10 [Urine:10]     Intake/Output Summary (Last 24 hours) at 2021 0745  Last data filed at 2021 0400  Gross per 24 hour   Intake 5234.36 ml   Output 45 ml   Net 5189.36 ml         ID  Concern for sepsis on floors, aspiration pneumonia  - Zosyn day 5  - WBC 89.9 (66.6-44.3-6.8-20.6-31.0)  - fever: tmax 24: 100.4  - pro aguilar 52 on   - consider ID consult       HEME   status post recent procedure, blood loss anemia  - Hemoglobin of 6.9 --7.3-7.7-9.2   -1 unit pRBC ordered   - Anemia & Thrombocytopenia   -DIC labs ordered   - Lovenox held due to JOSE    ENDO  Hypoglycemia   -D50 as needed    DVT: Lovenox     CHECKLIST    RASS: BIS- 0  RESTRAINTS: none  IVF: bicarb with LR 75  NUTRITION: NPO  ANTIBIOTICS: zosyn  GI: NPO; pepcid  DVT: lovenox --held  GLYCEMIC CONTROL: d10 ggt at 48  HOB >45: na    SUBJECTIVE    Bernerd Reasoner     : febrile overnight, worsening Lactate, improving oxygenation    : Had episodes of refractory tachycardia overnight requiring lopressor and adenosine. Resolved back to HR of 110s. Plan: Family meeting. Attempt high freq oscillation. Wean hydrocortisone if possible. Dc vancomycin. Dc epoprostenol. Monitor urine output & epinephrine requirements; may attempt lasix. Trial of NO & rotation. No ECMO due to high mortality.  acute cardiac arrest, ROSC achieved, on Levophed, x-ray showing aspiration pneumonia, covering Zosyn broad-spectrum concern for sepsis, acute blood loss anemia transfusing 1 unit, acute metabolic acidosis on bicarb drip has gotten 2 A of bicarb. : No significant changes overnight, several episodes of borderline hypoglycemia resolved with d50.   Required 1 amp of bicarbonate in AM.    OBJECTIVE  VITALS: Temp: Temp: 100.4 °F (38 °C)Temp  Av.8 °F (37.7 °C)  Min: 99 °F (37.2 °C)  Max: 100.4 °F (38 °C) BP Systolic (65CTQ), IAW:24 , Min:82 , CPD:994   Diastolic (01SFS), ZXU:79, Min:11, Max:82   Pulse Pulse  Avg: 120.3  Min: 116  Max: 125 Resp Resp  Av  Min: 0  Max: 0 Pulse ox SpO2  Av.3 %  Min: 68 %  Max: 88 %    CONSTITUTIONAL: Limited to intubation and sedation and ventilation. HEAD: edematous   EYES: nonreactive pupils  ENT: ears are symmetric, nares patent   HEENT: moist mucous membranes  NECK: Supple, symmetrical, trachea midline  LUNGS: bilateral ronchi  CARDIOVASCULAR: +S1/S2  ABDOMEN: soft,  nondistended,   NEUROLOGIC: intubated and sedated  EXTREMITIES:  Surgical changes to right lower extremities. SKIN: edematous; with multiple bullae      LAB:  CBC:   Recent Labs     212 218 21  0502   WBC 44.3* 66.6* 89.9*   HGB 7.7* 7.3* 6.9*   HCT 24.2* 23.9* 24.5*   MCV 84.9 87.2 95.3   PLT See Reflexed IPF Result See Reflexed IPF Result See Reflexed IPF Result     BMP:   Recent Labs     21  0502    146* 141   K 4.3 4.4 5.2   CL 96* 94* 87*   CO2 26 19* 12*   BUN 14 17 20   CREATININE 0.86 1.07* 1.78*   GLUCOSE 104* 103* 56*         RADIOLOGY:  Xr Chest (single View Frontal)    Result Date: 2021  No acute cardiopulmonary disease. Xr Femur Right (min 2 Views)    Result Date: 2021  Postoperative changes from internal fixation of the comminuted distal femoral fracture. Xr Femur Right (min 2 Views)    Result Date: 2021  Marginal reduction of displacement and overriding with no change in anterior angulation. Xr Femur Right (min 2 Views)    Result Date: 2021  Distal femur fracture as described with associated moderate knee joint effusion/hemarthrosis. Osteoporosis. Xr Knee Right (3 Views)    Result Date: 2021  Marginal reduction of displacement and overriding with no change in anterior angulation. Xr Knee Right (3 Views)    Result Date: 2021  Comminuted distal right femur fracture as described with intra-articular extension. Osteoporosis. Moderate knee joint effusion/hemarthrosis.      Xr Tibia Fibula Right (2 Views)    Result Date: 4/21/2021  Distal femur fracture. Please refer to previous reports. Osteoporosis. Old healed distal tibial fracture. 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 Other possibilities include enchondroma or less likely low-grade chondrosarcoma. Correlation with old studies would be most useful. An addendum can be made should they become available. Ct Head Wo Contrast    Result Date: 4/24/2021  Mild central and cortical cerebral atrophy. Mild chronic deep white matter ischemic changes No acute intracranial abnormalities are noted. Ct Head Wo Contrast    Result Date: 4/24/2021  No acute intracranial abnormality. No significant change in brain findings compared to the April 21, 2021 study. RECOMMENDATIONS: If further evaluation is needed, MRI is suggested. Ct Head Wo Contrast    Result Date: 4/21/2021  No acute intracranial abnormality. Ct Cervical Spine Wo Contrast    Result Date: 4/21/2021  No acute abnormality of the cervical spine. Ct Knee Right Wo Contrast    Result Date: 4/21/2021  Distal right femoral fracture with nondisplaced and possibly incomplete sagittal plane fracture in the distal portion extending to the lateral aspect of the intercondylar notch. Small to moderate hemarthrosis. Sclerotic lesion centrally within the tibial metaphysis most likely representing old bone infarct. Enchondroma is also a consideration. Recommend obtaining old comparison studies to document expected stability. Low-grade chondrosarcoma cannot be entirely excluded but is considered unlikely. Mri Lumbar Spine W Wo Contrast    Result Date: 4/22/2021  Posterior fixation and laminectomy from D3-N4 without complication. Acute/subacute compression deformity at L3. Remote compression deformity at L1, L4, and L5. Multilevel degenerative change with canal stenosis at L3-4 and bilateral foraminal narrowing as described above.      Xr Chest Portable    Result Date: 4/24/2021  Diffuse bilateral interstitial airspace disease, new since the prior study from April 21, 2021. Developing pulmonary edema is suspected. Ct Chest Pulmonary Embolism W Contrast    Result Date: 4/24/2021  1. No evidence for pulmonary embolism. 2.  Extensive bilateral airspace disease with ground-glass and scattered consolidative opacities. Trace pleural effusions are also noted. These findings are new since CT exam 3 days ago. These findings may represent multifocal inflammatory process, pulmonary hemorrhage or edema. 3.  Gaseous distention of the stomach. Mri Sacrum Coccyx W Wo Contrast    Result Date: 4/22/2021  1. Acute or subacute nondisplaced fractures of the bilateral sacral alae and posterior right iliac bone. 2. No suspicious marrow space-occupying lesion. Ct Chest Abdomen Pelvis W Contrast    Result Date: 4/21/2021  Acute nondisplaced left rib fractures posteriorly and laterally involving ribs 8 through 12. Ribs 10, 11 and 12 are fractured in 2 locations. There is no evidence overlying soft tissue injury and therefore does not meet graded criteria for chest wall injury. Possible acute compression deformities of mild degree at T11 and L1. Multiple old healed bilateral rib fractures more so on the left. And multiple old mild anterior wedge compression deformities. Ct Lumbar Spine Trauma Reconstruction    Result Date: 4/21/2021  1. Multilevel thoracolumbar vertebral body remote appearing compression fractures, as discussed above. 2. For clinical suspicion of superimposed acute vertebral compression, recommend MRI thoracic and lumbar spine examinations for further evaluation. 3. L4 through S1 posterior lumbar interbody fusion with L4 vertebral body screws appearing to expand beyond the superior endplate cortex. 4. 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   Cannot exclude association with metastatic disease. Recommend clinical correlation. Ct Thoracic Spine Trauma Reconstruction    Result Date: 4/21/2021  1. Multilevel thoracolumbar vertebral body remote appearing compression fractures, as discussed above. 2. For clinical suspicion of superimposed acute vertebral compression, recommend MRI thoracic and lumbar spine examinations for further evaluation. 3. L4 through S1 posterior lumbar interbody fusion with L4 vertebral body screws appearing to expand beyond the superior endplate cortex. 4. 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 Cannot exclude association with metastatic disease. Recommend clinical correlation. Xr Hip 2-3 Vw W Pelvis Right    Result Date: 4/21/2021  No acute abnormality evident. Posttraumatic changes on the left. Osteoporosis. Postop changes of the lumbar fusion and total left hip arthroplasty. Ulises Kahn MD  4/28/2021  7:45 AM          Trauma Attending Attestation      I have reviewed the above [facility] note(s) and confirmed the key elements of the medical history and physical exam. I have seen and examined the pt. I have discussed the findings, established the care plan and recommendations with Resident, [facility] RN, bedside nurse.   Neuro: keep sedation paralyzed   Family discussion and they wish to proceed with DNRcc  Critical care min: 35

## 2021-04-28 NOTE — DEATH NOTES
Death Pronouncement Note  Patient's Name: Gerda Bucio   Patient's YOB: 1955  MRN Number: [id number]    Admitting Provider: Brandy Patel MD  Attending Provider: Brandy Patel MD    Patient was examined and the following were absent: Pulses, Blood Pressure and Respiratory effort    I declared the patient dead on 4/28/2021 at [phone].     Preliminary Cause of Death:   Cardiac Arrest     Electronically signed by Nora Davies DO on 4/28/21 at 5:20 PM EDT

## 2021-04-28 NOTE — FLOWSHEET NOTE
Assessment:  Patient at end of life. Family gathering bedside, grieving. They express much love and positive regard for patient, and appear to be supporting each other well. Family describe her as a strong woman who also loved the Jehovah's witness. Intervention:   provided grief support, prayer blanket, and bedside prayer. Outcome and Plan:  Family venting emotion and grieving deeply. Chaplains remain available to support as needed/requested.        04/28/21 1500   Encounter Summary   Services provided to: Patient and family together;Family   Continue Visiting   (4/28/2021)   Complexity of Encounter High   Length of Encounter 45 minutes   Spiritual Assessment Completed Yes   Grief and Life Adjustment   Type End of life;Grief and loss   Assessment Grieving   Intervention End of life care;Grief care;Prayer   Outcome Grieving;Venting emotion;Engaged in conversation

## 2021-04-29 LAB
ABO/RH: NORMAL
ANTIBODY SCREEN: NEGATIVE
ARM BAND NUMBER: NORMAL
BLD PROD TYP BPU: NORMAL
CROSSMATCH RESULT: NORMAL
DISPENSE STATUS BLOOD BANK: NORMAL
EXPIRATION DATE: NORMAL
TRANSFUSION STATUS: NORMAL
UNIT DIVISION: 0
UNIT NUMBER: NORMAL

## 2021-04-29 NOTE — FLOWSHEET NOTE
[facility] [address]   Patient Death Note  DEATH   Shift date: 2021    Shift day: Wednesday  Shift #: 2                 Room # 4673/6881-12   Name: Presley Graham            Age: 73 y.o. Gender: female          Sikhism: [facility] [address]: unknown  Admit Date & Time: 2021 12:14 AM     Referral: Nurse   Actual date of death: 2021   TOD: 5:17 pm       SITUATION AT DEATH:  Cardiac arrest    IS THIS A 'S CASE? No    SPIRITUAL/EMOTIONAL INTERVENTION:   provided space for family to express feelings, thoughts, and concerns. Determined support to be available. Discussed end-of-life process. Helped facilitate discussion and memories of patient. Completed portion of release of body form. Remained present for family care and support. Family Received Grief Packet? Yes     NAME AND PHONE NUMBER OF DOCTOR SIGNING DEATH CERTIFICATE: Nurse is uncertain (Possibly Dr. Corey Schultz)     [address] will contact the  home    Copy of COMPLETED Release of Body Form Received? No    Patient's belongings: none     HOME:  Name: unknown  City: unknown  Phone Number: unknown    NEXT OF KIN:  Name: Bulmaro Vides  Relationship: Daughter  Street Address: [facility][[address] 3]. [address] [place] [address]: [address] [place] [address]   Zip code: [address]   Phone Number: [phone]ON[address] Yes    IF SO, WHAT?    home     Electronically signed by Macarena Celis on 2021 at 2:18 AM.  [address]  [phone]       21 1720   Encounter Summary   Services provided to: Patient   Referral/Consult From: Nurse   Support System Significant other;Children;Family members   Continue Visiting   ([phone])   Complexity of Encounter High   Length of Encounter 1 hour;30 minutes   Spiritual Assessment Completed Yes   Grief and Life Adjustment   Type Death   Assessment Approachable;Grieving   Intervention Active listening;Explored feelings, thoughts, concerns;Explored coping resources; Discussed illness/injury and it's impact;Sustaining presence/ Ministry of presence   Outcome Expressed feelings/needs/concerns;Engaged in conversation;Expressed gratitude     Electronically signed by Ray Hines on 4/29/2021 at 2:19 AM

## 2021-04-29 NOTE — DISCHARGE SUMMARY
DISCHARGE SUMMARY:    PATIENT NAME:  Dave Hernandez  YOB: 1955  MEDICAL RECORD NO. [id number]  DATE: 21  PRIMARY CARE PHYSICIAN: Liz Ritchie MD  ADMIT DATE:  2021    DISCHARGE DATE:  2021  DISPOSITION:     ADMITTING DIAGNOSIS:   Fall     DIAGNOSIS:   Patient Active Problem List   Diagnosis    Trauma    Fracture of right femur ([address]    Blood alcohol level of 120-199 mg/100 ml    Alcohol intoxication ([address]    Anemia    Fracture of multiple ribs of left side    Fracture of eleventh thoracic vertebra ([address]    Fracture of first lumbar vertebra ([address]    Acute postoperative anemia due to expected blood loss    Hyponatremia    Hypocalcemia    Recent unexplained weight loss    Left foot drop    Multiple fractures of pelvis with stable disruption of pelvic ring, initial encounter for closed fracture ([address]    Osteoporosis    Vitamin D deficiency    Tobacco use    Cocaine use       CONSULTANTS:  Neurosurgery, orthopedic surgery, cardiology      PROCEDURES:   :Open treatment of right intra-articular supracondylar femur fracture (orthopedic surgery)  CVC  Arterial line       HOSPITAL COURSE:   Dave Hernandez is a 73 y.o. female who was admitted on 2021  Hospital Course:  Patient presented as a trauma consult on  after suffering a fall from [facility] onto her knees. She sustained a right femur fracture, T-11 and L-1 compression fractures, and left rib 8-12 fractures. Orthopedic surgery and neurosurgery were consulted and patient was admitted. She was anemic on admission and received 1 u PRBCs. She underwent open treatment of a right intra-articular supracondylar femur fracture by orthopedic surgery. Neurosurgery evaluated and found the compression fractures to be chronic and requiring no further work-up or intervention. Patient recovered post-operatively, but on POD # 3 patient had a rapid response called on her due to AMS. CTH was ordered.  One hour later, patient had code blue called. Patient became bradycardic and lost pulses. She received 3 rounds of CPR, ROSC was achieved, lost and achieved again after another round of CPR. Patient was intubated and admitted to TICU. She did experience a large episode of emesis during intubation attempt. In TICU, patient's labs worsened and pressor requirements continued to increase. Cardiology was consulted and felt elevated troponin was secondary to PEA and did not recommend further ischemic workup. Patient continued to decline, was proned, then placed on HFOV for a period of time with slight respiratory improvement, but worsening of all other labs. Discussion was had with family at length and decision was made to make patient [facility] and withdraw care. Patient was extubated 2021 @ 1708 and TOD was called 2021 @ 1717. On day of discharge Mayte Jones  She was discharged to         PHYSICAL EXAMINATION:        Discharge Vitals:  height is 5' 1\" (1.549 m) and weight is 119 lb (54 kg). Her bladder temperature is 99 °F (37.2 °C). Her blood pressure is 91/58 (abnormal) and her pulse is 22 (abnormal). Her respiration is 5 (abnormal) and oxygen saturation is 42% (abnormal). Exam on day of discharge:  VITALS: Temp: Temp: 100.4 °F (38 °C)Temp  Av.8 °F (37.7 °C)  Min: 99 °F (37.2 °C)  Max: 100.4 °F (38 °C) BP Systolic (41CQM), AWF:18 , Min:82 , UBQ:544   Diastolic (27LAU), XXJ:25, Min:11, Max:82   Pulse Pulse  Av.3  Min: 116  Max: 125 Resp Resp  Av  Min: 0  Max: 0 Pulse ox SpO2  Av.3 %  Min: 68 %  Max: 88 %     CONSTITUTIONAL: Limited to intubation and sedation and ventilation.   HEAD: edematous   EYES: nonreactive pupils  ENT: ears are symmetric, nares patent   HEENT: moist mucous membranes  NECK: Supple, symmetrical, trachea midline  LUNGS: bilateral ronchi  CARDIOVASCULAR: +S1/S2  ABDOMEN: soft,  nondistended,   NEUROLOGIC: intubated and sedated  EXTREMITIES:  Surgical changes to right lower extremities. SKIN: edematous; with multiple bullae    LABS:     Recent Labs     04/27/21  0448 04/28/21  0502   WBC 66.6* 89.9*   HGB 7.3* 6.9*   HCT 23.9* 24.5*   PLT See Reflexed IPF Result See Reflexed IPF Result   * 141   K 4.4 5.2   CL 94* 87*   CO2 19* 12*   BUN 17 20   CREATININE 1.07* 1.78*       DIAGNOSTIC TESTS:    Xr Chest (single View Frontal)    Result Date: 4/22/2021  EXAMINATION: ONE XRAY VIEW OF THE CHEST 4/21/2021 1:54 am COMPARISON: None HISTORY: ORDERING SYSTEM PROVIDED HISTORY:  Pre-op TECHNOLOGIST PROVIDED HISTORY:  Pre-op Reason for Exam:  Pre-op supine port FINDINGS: No lines or tubes. Normal cardiomediastinal silhouette. The lungs are clear without focal consolidation or pleural effusion. No suspicious pulmonary nodules. No pulmonary edema. No pneumothorax. No acute osseous abnormality. Status post ORIF of the left proximal humerus. No acute cardiopulmonary disease. Xr Femur Right (min 2 Views)    Result Date: 4/21/2021  EXAMINATION: 2 XRAY VIEWS OF THE RIGHT FEMUR 4/21/2021 4:45 pm COMPARISON: Radiographs performed earlier the same day HISTORY: ORDERING SYSTEM PROVIDED HISTORY: Post op PACU please. Thank you. TECHNOLOGIST PROVIDED HISTORY: Post op PACU please. Thank you. Acuity: Acute Type of Exam: Ongoing FINDINGS: There has been interval intramedullary william and screw fixation of the comminuted distal femoral fracture with improved alignment of the fracture fragments. There is edema and emphysema in the overlying soft tissues, consistent with recent surgery. There is diffuse bone demineralization. Degenerative changes are noted in the right hip and knee. Postoperative changes from internal fixation of the comminuted distal femoral fracture. Xr Femur Right (min 2 Views)    Result Date: 4/21/2021  EXAMINATION: THREE XRAY VIEWS OF THE RIGHT KNEE; 2 XRAY VIEWS OF THE RIGHT FEMUR 4/21/2021 3:53 am COMPARISON: None.  HISTORY: ORDERING SYSTEM PROVIDED HISTORY: Right knee post traction TECHNOLOGIST PROVIDED HISTORY: Right knee post traction Reason for Exam: post traction FINDINGS: Following placement of a proximal tibial diaphyseal traction pin, anterior displacement of the distal portion has been reduced from 1 shaft with to 1/2 shaft with and overriding is no longer present. There has been no change in anterior angulation of approximately 25 degrees. The nondisplaced and possibly incomplete sagittal plane fracture through the distal portion extending to the lateral aspect of the intercondylar notch is again noted. The moderate heme arthrosis is again noted. On the cross-table lateral view, a tiny fat level is noted. Marginal reduction of displacement and overriding with no change in anterior angulation. Xr Femur Right (min 2 Views)    Result Date: 4/21/2021  EXAMINATION: 2 XRAY VIEWS OF THE RIGHT FEMUR 4/21/2021 12:58 am COMPARISON: None. HISTORY: ORDERING SYSTEM PROVIDED HISTORY: fall, deformity TECHNOLOGIST PROVIDED HISTORY: fall, deformity Reason for Exam: Fall distal femur deformity Acuity: Acute Type of Exam: Initial FINDINGS: There is an oblique fracture of the distal right femoral diaphysis with extension into the metaphysis. Within the distal portion, there is also nondisplaced sagittal plane fracture extending to the lateral aspect of the intercondylar notch. There is associated moderate joint effusion/hemarthrosis. 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 Soft tissues are noted for atherosclerotic calcification. Distal femur fracture as described with associated moderate knee joint effusion/hemarthrosis. Osteoporosis. Xr Knee Right (3 Views)    Result Date: 4/21/2021  EXAMINATION: THREE XRAY VIEWS OF THE RIGHT KNEE; 2 XRAY VIEWS OF THE RIGHT FEMUR 4/21/2021 3:53 am COMPARISON: None.  HISTORY: ORDERING SYSTEM PROVIDED HISTORY: Right knee post traction TECHNOLOGIST PROVIDED HISTORY: Trauma/Fracture Reason for Exam: fall pain to rt knee FINDINGS: Distal femur fracture. Please refer to previous reports. The tibia and fibula are noted for old healed oblique fracture of the distal tibial metadiaphyseal region. Bone density is decreased with the exception of probable old bone infarct at the proximal tibial metaphysis. Distal femur fracture. Please refer to previous reports. Osteoporosis. Old healed distal tibial fracture. 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 Other possibilities include enchondroma or less likely low-grade chondrosarcoma. Correlation with old studies would be most useful. An addendum can be made should they become available. Ct Head Wo Contrast    Result Date: 4/21/2021  EXAMINATION: CT OF THE HEAD WITHOUT CONTRAST  4/21/2021 2:38 am TECHNIQUE: CT of the head was performed without the administration of intravenous contrast. Dose modulation, iterative reconstruction, and/or weight based adjustment of the mA/kV was utilized to reduce the radiation dose to as low as reasonably achievable. COMPARISON: None. HISTORY: ORDERING SYSTEM PROVIDED HISTORY: trauma TECHNOLOGIST PROVIDED HISTORY: trauma Decision Support Exception->Emergency Medical Condition (MA) Reason for Exam: trauma Acuity: Acute Type of Exam: Initial FINDINGS: BRAIN/VENTRICLES: There is no acute intracranial hemorrhage, mass effect or midline shift. No abnormal extra-axial fluid collection. The gray-white differentiation is maintained without evidence of an acute infarct. There is no evidence of hydrocephalus. Moderate diffuse cerebral atrophy and mild chronic white matter ischemic change. ORBITS: The visualized portion of the orbits demonstrate no acute abnormality. SINUSES: The visualized paranasal sinuses and mastoid air cells demonstrate no acute abnormality. SOFT TISSUES/SKULL:  No acute abnormality of the visualized skull or soft tissues.      No acute intracranial abnormality. Ct Cervical Spine Wo Contrast    Result Date: 4/21/2021  EXAMINATION: CT OF THE CERVICAL SPINE WITHOUT CONTRAST 4/21/2021 2:38 am TECHNIQUE: CT of the cervical spine was performed without the administration of intravenous contrast. Multiplanar reformatted images are provided for review. Dose modulation, iterative reconstruction, and/or weight based adjustment of the mA/kV was utilized to reduce the radiation dose to as low as reasonably achievable. COMPARISON: None. HISTORY: ORDERING SYSTEM PROVIDED HISTORY: trauma TECHNOLOGIST PROVIDED HISTORY: trauma Decision Support Exception->Emergency Medical Condition (MA) Reason for Exam: trauma Acuity: Acute Type of Exam: Initial FINDINGS: BONES/ALIGNMENT: There is no acute fracture or traumatic malalignment. DEGENERATIVE CHANGES: Diffuse moderate degenerative changes greater than expected for age with probable bony foraminal stenosis on the right at C4-5 and bilaterally at C5-6. SOFT TISSUES: There is no prevertebral soft tissue swelling. No acute abnormality of the cervical spine. Ct Knee Right Wo Contrast    Result Date: 4/21/2021  EXAMINATION: CT OF THE RIGHT KNEE WITHOUT CONTRAST 4/21/2021 2:39 am TECHNIQUE: CT of the right knee was performed without the administration of intravenous contrast.  Multiplanar reformatted images are provided for review. Dose modulation, iterative reconstruction, and/or weight based adjustment of the mA/kV was utilized to reduce the radiation dose to as low as reasonably achievable. COMPARISON: None. HISTORY ORDERING SYSTEM PROVIDED HISTORY: Right distal femur fracture with intra-articular extension TECHNOLOGIST PROVIDED HISTORY: Right distal femur fracture with intra-articular extension Reason for Exam: Right distal femur fracture with intra-articular extension Acuity: Acute Type of Exam: Initial FINDINGS: Bones: Again noted is the oblique fracture of the distal right femoral diaphysis.   There is approximately 25 degrees anterior angulation of the distal portion and approximately 2.5 cm overriding. As noted on the plain film, there is a sagittal plane possibly incomplete fracture extending through the distal portion into the lateral aspect of the intercondylar notch. Bone density is diffusely decreased. Centrally within proximal tibial metaphysis, there is a sclerotic lesion consistent with old bone infarct or enchondroma. Soft Tissue:  No significant hematoma. Joint:  Small to moderate heme arthrosis. Minor degenerative change. Distal right femoral fracture with nondisplaced and possibly incomplete sagittal plane fracture in the distal portion extending to the lateral aspect of the intercondylar notch. Small to moderate hemarthrosis. Sclerotic lesion centrally within the tibial metaphysis most likely representing old bone infarct. Enchondroma is also a consideration. Recommend obtaining old comparison studies to document expected stability. Low-grade chondrosarcoma cannot be entirely excluded but is considered unlikely. Mri Lumbar Spine W Wo Contrast    Result Date: 4/22/2021  EXAMINATION: MRI OF THE LUMBAR SPINE WITHOUT AND WITH CONTRAST  4/22/2021 11:37 am TECHNIQUE: Multiplanar multisequence MRI of the lumbar spine was performed without and with the administration of intravenous contrast. COMPARISON: CT lumbar spine performed 04/21/2021. HISTORY: ORDERING SYSTEM PROVIDED HISTORY: lytic lesions TECHNOLOGIST PROVIDED HISTORY: lytic lesions Reason for Exam: lytic lesions FINDINGS: BONES/ALIGNMENT: There is remote compression deformity involving the L1, L4, and L5 levels. There is acute/subacute compression involving the L3 level. There is posterior fixation from L4-S1. There is multilevel degenerative disc disease at the remaining levels with loss of disc signal.  There is no significant disc space narrowing. There is no spondylolisthesis.  SPINAL CORD:  The conus medullaris is normal in nondisplaced fracture of the posterior right iliac bone is also seen. No displaced fracture or dislocation. No suspicious marrow space-occupying lesion. JOINTS: Degenerative changes of the visualized lower lumbar spine. Susceptibility artifact related to posterior lumbosacral fusion. Mild degenerative changes of the bilateral sacroiliac joints. Susceptibility artifact related to a left hip arthroplasty. The partially visualized right hip is intact. 1. Acute or subacute nondisplaced fractures of the bilateral sacral alae and posterior right iliac bone. 2. No suspicious marrow space-occupying lesion. Fluoro For Surgical Procedures    Result Date: 4/21/2021  Radiology exam is complete. No Radiologist dictation. Please follow up with ordering provider. Ct Chest Abdomen Pelvis W Contrast    Result Date: 4/21/2021  EXAMINATION: CT OF THE CHEST, ABDOMEN, AND PELVIS WITH CONTRAST 4/21/2021 2:38 am TECHNIQUE: CT of the chest, abdomen and pelvis was performed with the administration of intravenous contrast. Multiplanar reformatted images are provided for review. Dose modulation, iterative reconstruction, and/or weight based adjustment of the mA/kV was utilized to reduce the radiation dose to as low as reasonably achievable. COMPARISON: None HISTORY: ORDERING SYSTEM PROVIDED HISTORY: trauma TECHNOLOGIST PROVIDED HISTORY: trauma Reason for Exam: trauma Acuity: Acute Type of Exam: Initial FINDINGS: Chest: Mediastinum:  No evidence of mediastinal hematoma or pneumomediastinum. No acute traumatic injury to the heart or pericardium. The esophagus is fluid-filled and mildly ectatic. Lungs/pleura: Minor bibasilar atelectasis. The lungs are otherwise clear. No pneumothorax or pleural effusion. Soft Tissues/Bones: Multiple posterior and posterolateral left rib fractures are present involving ribs 8-12. Ribs 10, 11 and 12 are fractured in 2 locations. Bone density is diffusely osteoporotic.   There are multiple old thoracic compression deformities with possible acute on chronic mild to moderate compression deformity at T11. Abdomen/Pelvis: Organs:  Liver enhances normally without evidence of intrahepatic biliary ductal dilatation. Cholelithiasis without evidence of cholecystitis. The spleen, pancreas and adrenal glands are unremarkable. The kidneys and ureters are normal. GI/Bowel: Stomach and duodenal sweep demonstrate no acute abnormality. There is no evidence of bowel obstruction. No evidence of abnormal bowel wall thickening or distension. No evidence of appendicitis. Pelvis: The bladder is unremarkable. The patient is status post hysterectomy. Peritoneum/Retroperitoneum: No evidence of free air. No evidence of intraperitoneal/retroperitoneal hemorrhage or fluid. Bones/Soft Tissues: There may be acute mild anterior wedge compression deformity at L1 involving the inferior endplate. Bone density is decreased. The patient is status post posterior instrumented fusion with pedicle screws and rods from L4-S1. Acute nondisplaced left rib fractures posteriorly and laterally involving ribs 8 through 12. Ribs 10, 11 and 12 are fractured in 2 locations. There is no evidence overlying soft tissue injury and therefore does not meet graded criteria for chest wall injury. Possible acute compression deformities of mild degree at T11 and L1. Multiple old healed bilateral rib fractures more so on the left. And multiple old mild anterior wedge compression deformities. Ct Lumbar Spine Trauma Reconstruction    Result Date: 4/21/2021  EXAMINATION: CT OF THE THORACIC SPINE WITHOUT CONTRAST; CT OF THE LUMBAR SPINE WITHOUT CONTRAST  4/21/2021 2:39 am: TECHNIQUE: CT of the thoracic spine was performed without the administration of intravenous contrast. Multiplanar reformatted images are provided for review.  Dose modulation, iterative reconstruction, and/or weight based adjustment of the mA/kV was utilized to reduce the radiation Date: 4/21/2021  EXAMINATION: CT OF THE THORACIC SPINE WITHOUT CONTRAST; CT OF THE LUMBAR SPINE WITHOUT CONTRAST  4/21/2021 2:39 am: TECHNIQUE: CT of the thoracic spine was performed without the administration of intravenous contrast. Multiplanar reformatted images are provided for review. Dose modulation, iterative reconstruction, and/or weight based adjustment of the mA/kV was utilized to reduce the radiation dose to as low as reasonably achievable.; CT of the lumbar spine was performed without the administration of intravenous contrast. Multiplanar reformatted images are provided for review. Dose modulation, iterative reconstruction, and/or weight based adjustment of the mA/kV was utilized to reduce the radiation dose to as low as reasonably achievable. COMPARISON: None. HISTORY: ORDERING SYSTEM PROVIDED HISTORY: trauma TECHNOLOGIST PROVIDED HISTORY: trauma Reason for Exam: trauma Acuity: Acute Type of Exam: Initial; ORDERING SYSTEM PROVIDED HISTORY: TRAUMA TECHNOLOGIST PROVIDED HISTORY: trauma Reason for Exam: trauma Acuity: Acute Type of Exam: Initial FINDINGS: BONES/ALIGNMENT: There is normal alignment of the spine. L4 through S1 posterior lumbar interbody fusion is noted with the bilateral L4 cannulated pedicle screws appearing to extend beyond the superior endplate cortex. T3, T6, T7, T8, T9, T11, L1, L3, L4 and L5 vertebral body chronic appearing vertebral body compression fractures are noted with height loss greatest at the T11 level measuring approximately 50%. . Lumbar and sacral diffuse mixed lytic sclerotic appearance is seen. DEGENERATIVE CHANGES: No gross spinal canal stenosis or bony neural foraminal narrowing of the thoracic spine. SOFT TISSUES: No paraspinal mass is seen. 1. Multilevel thoracolumbar vertebral body remote appearing compression fractures, as discussed above.  2. For clinical suspicion of superimposed acute vertebral compression, recommend MRI thoracic and lumbar spine examinations

## 2021-04-30 LAB
CULTURE: NORMAL
Lab: NORMAL
SPECIMEN DESCRIPTION: NORMAL

## 2021-05-05 ENCOUNTER — TELEPHONE (OUTPATIENT)
Dept: PULMONOLOGY | Age: 66
End: 2021-05-05

## 2021-05-05 NOTE — TELEPHONE ENCOUNTER
Claudean Dayhoff at [facility] called and said you let him know if he ever needed help with a death cert you would try to help.  home Sharyle Morale) [phone] called asking him if we would sign. Please advise.  Thanks

## 2021-05-06 NOTE — TELEPHONE ENCOUNTER
I reached out to Dr. Payal Webber, who said he would take care of. If not done by tomorrow, let me know.

## 2023-07-06 NOTE — FLOWSHEET NOTE
04/23/21 2108   Encounter Summary   Services provided to: Patient   Referral/Consult From: Rubén   Continue Visiting   (04/23/2021)   Complexity of Encounter Low   Length of Encounter 30 minutes   Spiritual Assessment Completed Yes   Routine   Type Follow up   Assessment Calm; Approachable;Coping   Intervention Active listening;Explored feelings, thoughts, concerns;Explored coping resources;Nurtured hope;Prayer;Sustaining presence/ Ministry of presence   Outcome Comfort;Expressed gratitude;Coping Mauc Instructions: By selecting yes to the question below the MAUC number will be added into the note.  This will be calculated automatically based on the diagnosis chosen, the size entered, the body zone selected (H,M,L) and the specific indications you chose. You will also have the option to override the Mohs AUC if you disagree with the automatically calculated number and this option is found in the Case Summary tab.

## 2024-02-09 NOTE — CONSULTS
[facility]    Consult / H&P               Today's Date: 4/25/2021  Patient Name: Neeraj Montaño  Date of admission: 4/21/2021 12:14 AM  Patient's age: 73 y.o., 1955  Admission Dx: Tamika Biggs    Reason for Consult:  Cardiac evaluation    Requesting Physician: Laney Pathak MD    CHIEF COMPLAINT:  History of fall with femur fracture     History Obtained From:  electronic medical record    HISTORY OF PRESENT ILLNESS:      The patient is a 73 y.o.  female who is admitted to the hospital due to fall from standing height going to switch her laundry. We are consulted due to post cardiac arrest with EKG changes. Patient has PMH of dyslipidemia, osteoporosis, hx of GI bleed and recent unintentional weight loss. Per chart review. Patient did not lose any consciousness initially or hit her head. Patient sustained multiple traumatic injuries including right femur fracture, T11, L1 compression fracture and left 8, 9, 10, 11, 12 rib fractures. She is s/p open reduction with intramedullary knee nail insertion. Postop she received 1 unit of PRBC. Around yesterday noon patient was found to be altered with some slurred speech with no neurological deficit. CT head was obtained which showed no acute intracranial abnormality. RNR patient was found to be bradycardic followed by loss of pulses. Patient received 2 rounds of CPR. ACLS medication was given. Patient received 2 doses of epi, 1 bicarb and 1 amp of dextrose before ROSC was obtained. CT PE was obtained which showed no evidence of pulmonary embolism. Show extensive bilateral airspace disease with groundglass opacities. Patient was then intubated. She was hypotensive was started her on pressor support. Patient is on 50 of levo, 2 mcg of epinephrine and on vasopressin. She is sedated on propofol.   Weaned off of from fentanyl    Vitally her BP 98/73 with MAP of 60,   ABGs show pH 7.20, PCO2 58.4, PO2 43.9, HCO3 23.3 Pt called stating the cefdinir she is taking from her visit 2/4 has given her severe diarrhea. She has two days left of the medication and her sx have resolved. After speaking with nurse I informed pt to discontinue medication and to monitor sx. I informed pt if the diarrhea does not resolve itself after discontinuing medicine or her urinary sx return to come back in to be reevaluated. Pt understood and had no further questions.    Intravenous, PRN  vancomycin (VANCOCIN) 750 mg in dextrose 5 % 250 mL IVPB, 15 mg/kg, Intravenous, Q12H  piperacillin-tazobactam (ZOSYN) 4,500 mg in dextrose 5 % 100 mL IVPB (mini-bag), 4,500 mg, Intravenous, Q8H  LORazepam (ATIVAN) injection 1 mg, 1 mg, Intravenous, Q2H PRN  thiamine (B-1) 100 mg in sodium chloride 0.9 % 100 mL IVPB, 100 mg, Intravenous, Q24H  multivitamin 1 tablet, 1 tablet, Oral, Daily  famotidine (PEPCID) injection 20 mg, 20 mg, Intravenous, Daily  lactulose (CHRONULAC) 10 GM/15ML solution 20 g, 20 g, Oral, TID  fentaNYL 20 mcg/mL Infusion, 12.5-200 mcg/hr, Intravenous, Continuous  vasopressin 20 Units in dextrose 5 % 100 mL infusion, 0.01-0.04 Units/min, Intravenous, Continuous  enoxaparin (LOVENOX) injection 30 mg, 30 mg, Subcutaneous, BID  oxyCODONE (ROXICODONE) immediate release tablet 5 mg, 5 mg, Oral, Q6H PRN  sodium chloride flush 0.9 % injection 5-40 mL, 5-40 mL, Intravenous, 2 times per day  sodium chloride flush 0.9 % injection 5-40 mL, 5-40 mL, Intravenous, 2 times per day  sodium chloride flush 0.9 % injection 5-40 mL, 5-40 mL, Intravenous, PRN  polyethylene glycol (GLYCOLAX) packet 17 g, 17 g, Oral, Daily  bisacodyl (DULCOLAX) EC tablet 5 mg, 5 mg, Oral, Daily PRN  sennosides-docusate sodium (SENOKOT-S) 8.6-50 MG tablet 1 tablet, 1 tablet, Oral, BID  ondansetron (ZOFRAN) injection 4 mg, 4 mg, Intravenous, Q6H PRN    Allergies:  Sulfa antibiotics    Social History:   reports that she has been smoking. She does not have any smokeless tobacco history on file. She reports current alcohol use. She reports that she does not use drugs. Family History: family history includes Cancer in her mother; Prostate Cancer in her father.      REVIEW OF SYSTEMS:    · Cannot be obtained as patient is intubated and sedated      PHYSICAL EXAM:      /67   Pulse 118   Temp 98.2 °F (36.8 °C) (Oral)   Resp 20   Ht 5' 1\" (1.549 m)   Wt 119 lb (54 kg)   SpO2 (!) 60%   BMI 22.48 kg/m² Constitutional and General Appearance: Intubated and sedated  HEENT: PERRL, no cervical lymphadenopathy. No masses palpable. Normal oral mucosa  Respiratory:  · Normal excursion and expansion without use of accessory muscles  · Resp Auscultation: Good respiratory effort. No for increased work of breathing. On auscultation: Crackles bilaterally  Cardiovascular:  · The apical impulse is not displaced  · Heart tones are crisp and normal. regular S1 and S2.  · Jugular venous pulsation Normal  Abdomen:   · No masses or tenderness  · Bowel sounds present  Extremities:  ·  No Cyanosis or Clubbing  ·  Lower extremity edema: No  ·  Skin: Warm and dry  Neurological:  · Intubated and sedated    DATA:    Diagnostics:    EKG:   ECHO: obtained but not reviewed. Stress Test: not obtained. Cardiac Angiography: not obtained. Labs:     CBC:   Recent Labs     04/24/21  1529 04/24/21 2047 04/25/21  0347   WBC 20.6*  --  6.3   HGB 6.8* 9.5* 9.2*   HCT 23.5* 30.4* 29.4*     --  198     BMP:   Recent Labs     04/24/21 2047 04/25/21  0347    139   K 3.6* 3.6*   CO2 19* 21   BUN 8 9   CREATININE 0.58 0.70   LABGLOM >60 >60   GLUCOSE 104* 10*     LIVER PROFILE:  Recent Labs     04/24/21  1529 04/25/21  0347   * 101*   ALT 30 22   LABALBU 1.7* 1.3*       IMPRESSION:      1. PEA arrest likely respiratory due to bilateral lung opacities  2. Mild pulmonary vascular congestion   3. Hypovolemic shock  4. Supracondylar distal femur fracture s/p open reduction with intramedullary knee nail insertion  5. Hypoglycemia  6. Anemia  7. Alcohol withdrawal  8. Hyperammonemia  9. Substance abuse  10. Multiple rib fracture  11. NSTEMI type type II. Likely due to PEA arrest and CPR  12. Cocaine abuser    RECOMMENDATIONS:  1. Echo reviewed with hyperdynamic wall motion   2. Elevated troponin and proBNP is likely from the PEA arrest and CPR, anemia  3. Continue supportive management  4. Wean off from pressor  5.  No need for

## (undated) DEVICE — DRESSING TRNSPAR W5XL4.5IN FLM SHT SEMIPERMEABLE WIND

## (undated) DEVICE — DRILL, AO, STERILE

## (undated) DEVICE — GLOVE ORANGE PI 7   MSG9070

## (undated) DEVICE — TUBING AMB

## (undated) DEVICE — CYSTO/BLADDER IRRIGATION SET, REGULATING CLAMP

## (undated) DEVICE — C-ARMOR C-ARM EQUIPMENT COVERS CLEAR STERILE UNIVERSAL FIT 12 PER CASE: Brand: C-ARMOR

## (undated) DEVICE — DRESSING,GAUZE,XEROFORM,CURAD,1"X8",ST: Brand: CURAD

## (undated) DEVICE — REAMER SHAFT, MOD.TRINKLE: Brand: BIXCUT

## (undated) DEVICE — APPLICATOR MEDICATED 26 CC SOLUTION HI LT ORNG CHLORAPREP

## (undated) DEVICE — SVMMC ORTH SPL DRP PK

## (undated) DEVICE — GUIDE WIRE, BALL-TIPPED, STERILE

## (undated) DEVICE — PADDING CAST W6INXL4YD COT LO LINTING WYTEX

## (undated) DEVICE — SUTURE VCRL SZ 2-0 L18IN ABSRB UD CT-1 L36MM 1/2 CIR J839D

## (undated) DEVICE — INTENDED FOR TISSUE SEPARATION, AND OTHER PROCEDURES THAT REQUIRE A SHARP SURGICAL BLADE TO PUNCTURE OR CUT.: Brand: BARD-PARKER ® CARBON RIB-BACK BLADES

## (undated) DEVICE — CANNULATED DRILL

## (undated) DEVICE — BLADE CLIPPER GEN PURP NS

## (undated) DEVICE — GOWN,SIRUS,NONRNF,SETINSLV,XL,20/CS: Brand: MEDLINE

## (undated) DEVICE — BANDAGE COMPR W6INXL15YD WHT BGE POLY COT WV E HK LOOP CLSR

## (undated) DEVICE — C-ARM: Brand: UNBRANDED

## (undated) DEVICE — K-WIRE, STERILE
Type: IMPLANTABLE DEVICE | Status: NON-FUNCTIONAL
Removed: 2021-04-21

## (undated) DEVICE — SOLUTION SCRB 4OZ 4% CHG H2O AIDED FOR PREOPERATIVE SKIN

## (undated) DEVICE — GLOVE ORTHO 8   MSG9480

## (undated) DEVICE — Z DISCONTINUED USE 2272124 DRAPE SURG XL N INVASIVE 2 LAYR DISP

## (undated) DEVICE — THREADED GUIDE WIRE

## (undated) DEVICE — DRAPE,U/ SHT,SPLIT,PLAS,STERIL: Brand: MEDLINE

## (undated) DEVICE — BANDAGE COBAN 6 IN WND 6INX5YD FOAM

## (undated) DEVICE — GARMENT,MEDLINE,DVT,INT,CALF,MED, GEN2: Brand: MEDLINE

## (undated) DEVICE — GLOVE ORANGE PI 8 1/2   MSG9085

## (undated) DEVICE — GLOVE ORANGE PI 8   MSG9080

## (undated) DEVICE — HYPODERMIC SAFETY NEEDLE: Brand: MAGELLAN

## (undated) DEVICE — SUTURE VCRL SZ 0 L18IN ABSRB UD L36MM CT-1 1/2 CIR J840D

## (undated) DEVICE — STOCKINETTE,IMPERVIOUS,12X48,STERILE: Brand: MEDLINE

## (undated) DEVICE — 3M™ IOBAN™ 2 ANTIMICROBIAL INCISE DRAPE 6650EZ: Brand: IOBAN™ 2

## (undated) DEVICE — GLOVE ORANGE PI 7 1/2   MSG9075

## (undated) DEVICE — DRILL BIT NON-LOCKING, SHORT: Brand: AXSOS

## (undated) DEVICE — GAUZE,SPONGE,FLUFF,6"X6.75",STRL,5/TRAY: Brand: MEDLINE